# Patient Record
Sex: FEMALE | Race: WHITE | HISPANIC OR LATINO | Employment: FULL TIME | URBAN - METROPOLITAN AREA
[De-identification: names, ages, dates, MRNs, and addresses within clinical notes are randomized per-mention and may not be internally consistent; named-entity substitution may affect disease eponyms.]

---

## 2017-01-03 ENCOUNTER — FCC EXTENDED DOCUMENTATION (OUTPATIENT)
Dept: PSYCHOLOGY | Facility: CLINIC | Age: 22
End: 2017-01-03

## 2017-01-03 NOTE — PROGRESS NOTES
Discharge Summary  Multiple Sessions    Client Name: Priya Castano MRN#: [id number] YOB: 1995      Intake / Discharge Date: 6-12-15 and 1-3-17      DSM5 Diagnoses: (Sustained by DSM5 Criteria Listed Above)  Diagnoses: 309.81 (F43.10) Posttraumatic Stress Disorder (includes Posttraumatic Stress Dsiorder for Children 6 Years and Younger) Without dissociative symptoms  296.23 Major Depressive Disorder, Single Episode, Severe  Psychosocial & Contextual Factors: Mother passed away in an ATV accident   WHODAS 2.0 (12 item)  This questionnaire asks about difficulties due to health conditions. Health conditions  include  disease or illnesses, other health problems that may be short or long lasting,  injuries, mental health or emotional problems, and problems with alcohol or drugs.  Think back over the past 30 days and answer these questions, thinking about how much  difficulty you had doing the following activities. For each question, please [place] only one  response.  S1   Standing for long periods such as 30 minutes?   Mild = 2     S2   Taking care of household responsibilities?   None = 1     S3   Learning a new task, for example, learning how to get to a new place?   Mild = 2     S4   How much of a problem do you have joining community activities (for example, festivals, Mosque or other activities) in the same way as anyone else can?   Mild = 2     S5   How much have you been emotionally affected by your health problems?   Severe = 4     In the past 30 days, how much difficulty did you have in:     S6   Concentrating on doing something for ten minutes?   Severe = 4     S7   Walking a long distance such as a kilometer (or equivalent)?   None = 1     S8   Washing your whole body?   Mild = 2     S9   Getting dressed?   Mild = 2     S10   Dealing with people you do not know?   Moderate = 3     S11   Maintaining a friendship?   Severe = 4     S12   Your day to day work?   Moderate = 3          H1   Overall, in the past 30 days, how many days were these difficulties present?   Record number of days 25     H2   In the past 30 days, for how many days were you totally unable to carry out your usual activities or work because of any health condition?   Record number of days 0     H3   In the past 30 days, not counting the days that you were totally unable, for how many days did you cut back or reduce your usual activities or work because of any health condition?   Record number of days 25                        Presenting Concern:  Emotional, mental health and family struggles       Reason for Discharge:  Client is satisfied with progress      Disposition at Time of Last Encounter:   Comments:   Client made some progress with being assertive as an adult and also worked through some grief and loss     Risk Management:   Client has had a history of suicidal ideation: Tied to when her mothers passed away but no plan or intent  A safety and risk management plan has not been developed at this time, however client was given the after-hours number / 911 should there be a change in any of these risk factors.      Referred To:  ALLA Miller,    1/3/2017

## 2017-09-03 ENCOUNTER — HEALTH MAINTENANCE LETTER (OUTPATIENT)
Age: 22
End: 2017-09-03

## 2023-07-27 ENCOUNTER — APPOINTMENT (OUTPATIENT)
Dept: ULTRASOUND IMAGING | Facility: CLINIC | Age: 28
DRG: 872 | End: 2023-07-27
Attending: EMERGENCY MEDICINE

## 2023-07-27 ENCOUNTER — HOSPITAL ENCOUNTER (INPATIENT)
Facility: CLINIC | Age: 28
LOS: 3 days | Discharge: HOME | DRG: 872 | End: 2023-07-30
Attending: EMERGENCY MEDICINE | Admitting: FAMILY MEDICINE

## 2023-07-27 DIAGNOSIS — R17 ELEVATED BILIRUBIN: ICD-10-CM

## 2023-07-27 DIAGNOSIS — K70.31 ASCITES DUE TO ALCOHOLIC CIRRHOSIS (H): ICD-10-CM

## 2023-07-27 DIAGNOSIS — K70.10 ACUTE ALCOHOLIC HEPATITIS (H): Primary | ICD-10-CM

## 2023-07-27 DIAGNOSIS — F10.220 ACUTE ALCOHOLIC INTOXICATION IN ALCOHOLISM WITHOUT COMPLICATION (H): ICD-10-CM

## 2023-07-27 LAB
ALBUMIN SERPL BCG-MCNC: 2.7 G/DL (ref 3.5–5.2)
ALCOHOL BREATH TEST: 0.11 (ref 0–0.01)
ALP SERPL-CCNC: 304 U/L (ref 35–104)
ALT SERPL W P-5'-P-CCNC: 52 U/L (ref 0–50)
AMMONIA PLAS-SCNC: 37 UMOL/L (ref 11–51)
AMPHETAMINES UR QL SCN: NORMAL
ANION GAP SERPL CALCULATED.3IONS-SCNC: 13 MMOL/L (ref 7–15)
ANION GAP SERPL CALCULATED.3IONS-SCNC: 9 MMOL/L (ref 7–15)
APTT PPP: 35 SECONDS (ref 22–38)
AST SERPL W P-5'-P-CCNC: 185 U/L (ref 0–45)
ATRIAL RATE - MUSE: 134 BPM
BARBITURATES UR QL SCN: NORMAL
BASOPHILS # BLD AUTO: 0.2 10E3/UL (ref 0–0.2)
BASOPHILS NFR BLD AUTO: 1 %
BENZODIAZ UR QL SCN: NORMAL
BILIRUB SERPL-MCNC: 3.7 MG/DL
BUN SERPL-MCNC: 1.6 MG/DL (ref 6–20)
BUN SERPL-MCNC: 1.9 MG/DL (ref 6–20)
BZE UR QL SCN: NORMAL
CALCIUM SERPL-MCNC: 7.9 MG/DL (ref 8.6–10)
CALCIUM SERPL-MCNC: 8.2 MG/DL (ref 8.6–10)
CANNABINOIDS UR QL SCN: NORMAL
CHLORIDE SERPL-SCNC: 103 MMOL/L (ref 98–107)
CHLORIDE SERPL-SCNC: 104 MMOL/L (ref 98–107)
CREAT SERPL-MCNC: 0.39 MG/DL (ref 0.51–0.95)
CREAT SERPL-MCNC: 0.42 MG/DL (ref 0.51–0.95)
DEPRECATED HCO3 PLAS-SCNC: 20 MMOL/L (ref 22–29)
DEPRECATED HCO3 PLAS-SCNC: 23 MMOL/L (ref 22–29)
DIASTOLIC BLOOD PRESSURE - MUSE: NORMAL MMHG
EOSINOPHIL # BLD AUTO: 0.4 10E3/UL (ref 0–0.7)
EOSINOPHIL NFR BLD AUTO: 2 %
ERYTHROCYTE [DISTWIDTH] IN BLOOD BY AUTOMATED COUNT: 17.5 % (ref 10–15)
GFR SERPL CREATININE-BSD FRML MDRD: >90 ML/MIN/1.73M2
GFR SERPL CREATININE-BSD FRML MDRD: >90 ML/MIN/1.73M2
GLUCOSE SERPL-MCNC: 104 MG/DL (ref 70–99)
GLUCOSE SERPL-MCNC: 126 MG/DL (ref 70–99)
HAV IGM SERPL QL IA: NONREACTIVE
HBV CORE IGM SERPL QL IA: NONREACTIVE
HBV SURFACE AG SERPL QL IA: NONREACTIVE
HCG UR QL: NEGATIVE
HCT VFR BLD AUTO: 28.3 % (ref 35–47)
HCV AB SERPL QL IA: NONREACTIVE
HGB BLD-MCNC: 9.7 G/DL (ref 11.7–15.7)
IMM GRANULOCYTES # BLD: 0.9 10E3/UL
IMM GRANULOCYTES NFR BLD: 4 %
INR PPP: 1.86 (ref 0.85–1.15)
INTERPRETATION ECG - MUSE: NORMAL
LYMPHOCYTES # BLD AUTO: 3.5 10E3/UL (ref 0.8–5.3)
LYMPHOCYTES NFR BLD AUTO: 14 %
MAGNESIUM SERPL-MCNC: 1.7 MG/DL (ref 1.7–2.3)
MAGNESIUM SERPL-MCNC: 1.7 MG/DL (ref 1.7–2.3)
MCH RBC QN AUTO: 33.8 PG (ref 26.5–33)
MCHC RBC AUTO-ENTMCNC: 34.3 G/DL (ref 31.5–36.5)
MCV RBC AUTO: 99 FL (ref 78–100)
MONOCYTES # BLD AUTO: 2.5 10E3/UL (ref 0–1.3)
MONOCYTES NFR BLD AUTO: 10 %
NEUTROPHILS # BLD AUTO: 18 10E3/UL (ref 1.6–8.3)
NEUTROPHILS NFR BLD AUTO: 69 %
NRBC # BLD AUTO: 0 10E3/UL
NRBC BLD AUTO-RTO: 0 /100
OPIATES UR QL SCN: NORMAL
P AXIS - MUSE: 27 DEGREES
PHOSPHATE SERPL-MCNC: 2.9 MG/DL (ref 2.5–4.5)
PHOSPHATE SERPL-MCNC: 3.1 MG/DL (ref 2.5–4.5)
PLATELET # BLD AUTO: 268 10E3/UL (ref 150–450)
POTASSIUM SERPL-SCNC: 4 MMOL/L (ref 3.4–5.3)
POTASSIUM SERPL-SCNC: 4.1 MMOL/L (ref 3.4–5.3)
PR INTERVAL - MUSE: 122 MS
PROT SERPL-MCNC: 7.7 G/DL (ref 6.4–8.3)
QRS DURATION - MUSE: 84 MS
QT - MUSE: 308 MS
QTC - MUSE: 459 MS
R AXIS - MUSE: 21 DEGREES
RBC # BLD AUTO: 2.87 10E6/UL (ref 3.8–5.2)
SODIUM SERPL-SCNC: 135 MMOL/L (ref 136–145)
SODIUM SERPL-SCNC: 137 MMOL/L (ref 136–145)
SYSTOLIC BLOOD PRESSURE - MUSE: NORMAL MMHG
T AXIS - MUSE: 5 DEGREES
VENTRICULAR RATE- MUSE: 134 BPM
WBC # BLD AUTO: 25.4 10E3/UL (ref 4–11)

## 2023-07-27 PROCEDURE — 93010 ELECTROCARDIOGRAM REPORT: CPT | Performed by: EMERGENCY MEDICINE

## 2023-07-27 PROCEDURE — 80307 DRUG TEST PRSMV CHEM ANLYZR: CPT | Performed by: EMERGENCY MEDICINE

## 2023-07-27 PROCEDURE — 85610 PROTHROMBIN TIME: CPT | Performed by: EMERGENCY MEDICINE

## 2023-07-27 PROCEDURE — 258N000003 HC RX IP 258 OP 636: Performed by: STUDENT IN AN ORGANIZED HEALTH CARE EDUCATION/TRAINING PROGRAM

## 2023-07-27 PROCEDURE — 85730 THROMBOPLASTIN TIME PARTIAL: CPT | Performed by: EMERGENCY MEDICINE

## 2023-07-27 PROCEDURE — 93005 ELECTROCARDIOGRAM TRACING: CPT | Performed by: EMERGENCY MEDICINE

## 2023-07-27 PROCEDURE — 84100 ASSAY OF PHOSPHORUS: CPT | Performed by: STUDENT IN AN ORGANIZED HEALTH CARE EDUCATION/TRAINING PROGRAM

## 2023-07-27 PROCEDURE — 120N000002 HC R&B MED SURG/OB UMMC

## 2023-07-27 PROCEDURE — 80074 ACUTE HEPATITIS PANEL: CPT | Performed by: STUDENT IN AN ORGANIZED HEALTH CARE EDUCATION/TRAINING PROGRAM

## 2023-07-27 PROCEDURE — 99285 EMERGENCY DEPT VISIT HI MDM: CPT | Mod: 25 | Performed by: EMERGENCY MEDICINE

## 2023-07-27 PROCEDURE — 36415 COLL VENOUS BLD VENIPUNCTURE: CPT | Performed by: EMERGENCY MEDICINE

## 2023-07-27 PROCEDURE — 85025 COMPLETE CBC W/AUTO DIFF WBC: CPT | Performed by: EMERGENCY MEDICINE

## 2023-07-27 PROCEDURE — 83735 ASSAY OF MAGNESIUM: CPT | Performed by: STUDENT IN AN ORGANIZED HEALTH CARE EDUCATION/TRAINING PROGRAM

## 2023-07-27 PROCEDURE — 96360 HYDRATION IV INFUSION INIT: CPT | Performed by: EMERGENCY MEDICINE

## 2023-07-27 PROCEDURE — 258N000003 HC RX IP 258 OP 636: Performed by: EMERGENCY MEDICINE

## 2023-07-27 PROCEDURE — 250N000013 HC RX MED GY IP 250 OP 250 PS 637: Performed by: EMERGENCY MEDICINE

## 2023-07-27 PROCEDURE — 80053 COMPREHEN METABOLIC PANEL: CPT | Performed by: EMERGENCY MEDICINE

## 2023-07-27 PROCEDURE — 250N000013 HC RX MED GY IP 250 OP 250 PS 637: Performed by: STUDENT IN AN ORGANIZED HEALTH CARE EDUCATION/TRAINING PROGRAM

## 2023-07-27 PROCEDURE — 82140 ASSAY OF AMMONIA: CPT | Performed by: STUDENT IN AN ORGANIZED HEALTH CARE EDUCATION/TRAINING PROGRAM

## 2023-07-27 PROCEDURE — 76705 ECHO EXAM OF ABDOMEN: CPT

## 2023-07-27 PROCEDURE — 250N000011 HC RX IP 250 OP 636: Performed by: STUDENT IN AN ORGANIZED HEALTH CARE EDUCATION/TRAINING PROGRAM

## 2023-07-27 PROCEDURE — 81025 URINE PREGNANCY TEST: CPT | Performed by: EMERGENCY MEDICINE

## 2023-07-27 PROCEDURE — 82310 ASSAY OF CALCIUM: CPT | Performed by: STUDENT IN AN ORGANIZED HEALTH CARE EDUCATION/TRAINING PROGRAM

## 2023-07-27 PROCEDURE — 36415 COLL VENOUS BLD VENIPUNCTURE: CPT | Performed by: STUDENT IN AN ORGANIZED HEALTH CARE EDUCATION/TRAINING PROGRAM

## 2023-07-27 RX ORDER — FLUMAZENIL 0.1 MG/ML
0.2 INJECTION, SOLUTION INTRAVENOUS
Status: DISCONTINUED | OUTPATIENT
Start: 2023-07-27 | End: 2023-07-30 | Stop reason: HOSPADM

## 2023-07-27 RX ORDER — OLANZAPINE 5 MG/1
5-10 TABLET, ORALLY DISINTEGRATING ORAL EVERY 6 HOURS PRN
Status: DISCONTINUED | OUTPATIENT
Start: 2023-07-27 | End: 2023-07-29

## 2023-07-27 RX ORDER — HALOPERIDOL 5 MG/ML
2.5-5 INJECTION INTRAMUSCULAR EVERY 6 HOURS PRN
Status: DISCONTINUED | OUTPATIENT
Start: 2023-07-27 | End: 2023-07-29

## 2023-07-27 RX ORDER — MULTIPLE VITAMINS W/ MINERALS TAB 9MG-400MCG
1 TAB ORAL DAILY
Status: DISCONTINUED | OUTPATIENT
Start: 2023-07-27 | End: 2023-07-27

## 2023-07-27 RX ORDER — PROCHLORPERAZINE MALEATE 5 MG
10 TABLET ORAL EVERY 6 HOURS PRN
Status: DISCONTINUED | OUTPATIENT
Start: 2023-07-27 | End: 2023-07-30 | Stop reason: HOSPADM

## 2023-07-27 RX ORDER — CLONIDINE HYDROCHLORIDE 0.1 MG/1
0.1 TABLET ORAL EVERY 8 HOURS
Status: DISCONTINUED | OUTPATIENT
Start: 2023-07-27 | End: 2023-07-27

## 2023-07-27 RX ORDER — NORETHINDRONE ACETATE AND ETHINYL ESTRADIOL .03; 1.5 MG/1; MG/1
1 TABLET ORAL DAILY
Status: DISCONTINUED | OUTPATIENT
Start: 2023-07-27 | End: 2023-07-27

## 2023-07-27 RX ORDER — MULTIPLE VITAMINS W/ MINERALS TAB 9MG-400MCG
1 TAB ORAL DAILY
Status: DISCONTINUED | OUTPATIENT
Start: 2023-07-28 | End: 2023-07-27

## 2023-07-27 RX ORDER — ONDANSETRON 2 MG/ML
4 INJECTION INTRAMUSCULAR; INTRAVENOUS EVERY 6 HOURS PRN
Status: DISCONTINUED | OUTPATIENT
Start: 2023-07-27 | End: 2023-07-30 | Stop reason: HOSPADM

## 2023-07-27 RX ORDER — DIAZEPAM 10 MG/2ML
5-10 INJECTION, SOLUTION INTRAMUSCULAR; INTRAVENOUS EVERY 30 MIN PRN
Status: DISCONTINUED | OUTPATIENT
Start: 2023-07-27 | End: 2023-07-29

## 2023-07-27 RX ORDER — DIAZEPAM 10 MG
10 TABLET ORAL EVERY 30 MIN PRN
Status: DISCONTINUED | OUTPATIENT
Start: 2023-07-27 | End: 2023-07-29

## 2023-07-27 RX ORDER — LORAZEPAM 1 MG/1
1-4 TABLET ORAL EVERY 30 MIN PRN
Status: DISCONTINUED | OUTPATIENT
Start: 2023-07-27 | End: 2023-07-27

## 2023-07-27 RX ORDER — IBUPROFEN 600 MG/1
600 TABLET, FILM COATED ORAL EVERY 6 HOURS PRN
Status: DISCONTINUED | OUTPATIENT
Start: 2023-07-27 | End: 2023-07-30 | Stop reason: HOSPADM

## 2023-07-27 RX ORDER — FOLIC ACID 1 MG/1
1 TABLET ORAL DAILY
Status: DISCONTINUED | OUTPATIENT
Start: 2023-07-27 | End: 2023-07-27

## 2023-07-27 RX ORDER — ONDANSETRON 4 MG/1
4 TABLET, ORALLY DISINTEGRATING ORAL EVERY 6 HOURS PRN
Status: DISCONTINUED | OUTPATIENT
Start: 2023-07-27 | End: 2023-07-30 | Stop reason: HOSPADM

## 2023-07-27 RX ORDER — LIDOCAINE 40 MG/G
CREAM TOPICAL
Status: DISCONTINUED | OUTPATIENT
Start: 2023-07-27 | End: 2023-07-30 | Stop reason: HOSPADM

## 2023-07-27 RX ORDER — FOLIC ACID 1 MG/1
1 TABLET ORAL DAILY
Status: DISCONTINUED | OUTPATIENT
Start: 2023-07-28 | End: 2023-07-27

## 2023-07-27 RX ORDER — PANTOPRAZOLE SODIUM 40 MG/1
40 TABLET, DELAYED RELEASE ORAL
Status: DISCONTINUED | OUTPATIENT
Start: 2023-07-27 | End: 2023-07-30 | Stop reason: HOSPADM

## 2023-07-27 RX ORDER — PROCHLORPERAZINE 25 MG
25 SUPPOSITORY, RECTAL RECTAL EVERY 12 HOURS PRN
Status: DISCONTINUED | OUTPATIENT
Start: 2023-07-27 | End: 2023-07-30 | Stop reason: HOSPADM

## 2023-07-27 RX ADMIN — LORAZEPAM 2 MG: 1 TABLET ORAL at 13:59

## 2023-07-27 RX ADMIN — THIAMINE HCL TAB 100 MG 100 MG: 100 TAB at 13:59

## 2023-07-27 RX ADMIN — SODIUM CHLORIDE 1000 ML: 9 INJECTION, SOLUTION INTRAVENOUS at 10:56

## 2023-07-27 RX ADMIN — DIAZEPAM 10 MG: 10 TABLET ORAL at 17:55

## 2023-07-27 RX ADMIN — SODIUM CHLORIDE, POTASSIUM CHLORIDE, SODIUM LACTATE AND CALCIUM CHLORIDE 1000 ML: 600; 310; 30; 20 INJECTION, SOLUTION INTRAVENOUS at 22:34

## 2023-07-27 RX ADMIN — FOLIC ACID 1 MG: 1 TABLET ORAL at 13:59

## 2023-07-27 RX ADMIN — PANTOPRAZOLE SODIUM 40 MG: 40 TABLET, DELAYED RELEASE ORAL at 17:49

## 2023-07-27 RX ADMIN — MULTIPLE VITAMINS W/ MINERALS TAB 1 TABLET: TAB at 13:59

## 2023-07-27 RX ADMIN — THIAMINE HYDROCHLORIDE 500 MG: 100 INJECTION, SOLUTION INTRAMUSCULAR; INTRAVENOUS at 15:33

## 2023-07-27 RX ADMIN — THIAMINE HYDROCHLORIDE 500 MG: 100 INJECTION, SOLUTION INTRAMUSCULAR; INTRAVENOUS at 20:36

## 2023-07-27 ASSESSMENT — ACTIVITIES OF DAILY LIVING (ADL)
CONCENTRATING,_REMEMBERING_OR_MAKING_DECISIONS_DIFFICULTY: NO
ADLS_ACUITY_SCORE: 31
ADLS_ACUITY_SCORE: 35
FALL_HISTORY_WITHIN_LAST_SIX_MONTHS: NO
ADLS_ACUITY_SCORE: 35
WEAR_GLASSES_OR_BLIND: NO
ADLS_ACUITY_SCORE: 20
DRESSING/BATHING_DIFFICULTY: NO
ADLS_ACUITY_SCORE: 35
CHANGE_IN_FUNCTIONAL_STATUS_SINCE_ONSET_OF_CURRENT_ILLNESS/INJURY: NO
ADLS_ACUITY_SCORE: 35
ADLS_ACUITY_SCORE: 35
TOILETING_ISSUES: NO
DIFFICULTY_COMMUNICATING: NO
DOING_ERRANDS_INDEPENDENTLY_DIFFICULTY: NO
WALKING_OR_CLIMBING_STAIRS_DIFFICULTY: NO
DIFFICULTY_EATING/SWALLOWING: NO

## 2023-07-27 NOTE — PHARMACY-ADMISSION MEDICATION HISTORY
Pharmacist Admission Medication History    Admission medication history is complete. The information provided in this note is only as accurate as the sources available at the time of the update.    Medication reconciliation/reorder completed by provider prior to medication history? No    Information Source(s): Patient and CareEverywhere/SureScripts via in-person    Pertinent Information: none    Changes made to PTA medication list:  Added: None  Deleted: birth control  Changed: None    Medication Affordability: unable to assess     Allergies reviewed with patient and updates made in EHR: yes    Medication History Completed By: Sophie Ramirez RPH 7/27/2023 2:43 PM    Prior to Admission medications    Not on File

## 2023-07-27 NOTE — ED TRIAGE NOTES
Alcohol Problem Seeking detox from etoh. Last drink was last night. Drinks 1 bottle of wine daily x 2 months. Denies hx withdrawals seizures, drug use.       Triage Assessment       Row Name 07/27/23 0957       Triage Assessment (Adult)    Airway WDL WDL       Respiratory WDL    Respiratory WDL WDL       Skin Circulation/Temperature WDL    Skin Circulation/Temperature WDL WDL       Cardiac WDL    Cardiac WDL WDL       Peripheral/Neurovascular WDL    Peripheral Neurovascular WDL WDL       Cognitive/Neuro/Behavioral WDL    Cognitive/Neuro/Behavioral WDL WDL

## 2023-07-27 NOTE — H&P
[facility]    History and Physical - [facility]       Date of Admission:  7/27/2023    Assessment & Plan      Priya Castano is a 28 year old female admitted on 7/27/2023. She has a past medical history significant for hypothyroidism and latent TB and presented to the ED seeking detox and was found to have acute alcoholic hepatitis.      # Mild acute alcoholic hepatitis  # Alcohol withdrawal  # Jaundice, scleral icterus  Patient with last reported use 7/26 around 2000. History of several other periods of withdraw, and no history of withdrawal seizures or psychosis. States that her family is already working towards placement in a combined inpatient/outpatient treatment facility once detox is complete. She has some understanding of her disease process, but would likely benefit from a more extensive conversation of the damage alcohol inflicts to her body. Differential for jaundice includes extra/intrahepatic cholestasis. No sign of ductal dilatation or obstruction on US. Patient is on no medications that could be contributing to her condition. She denies history of STI and substance use other than alcohol. UDS was negative. Could consider adding clonidine and/or gabapentin for symptomatic management as needed. Patient immunized for Hep A and Hep B, though will confirm with serologic testing that she is immune from HepB. No history or evidence of variceal/GI bleeding or previous instances of SBP, so no indication for prophylaxis at this time. Hepatitis discriminant function index -10, no indication for glucocorticoids at this time. Factors with the best ability to predict cirrhosis in adults (without a liver biopsy for definitive diagnosis) include presence of ascites (small volume in this patient), platelet count <160 (268 in our patient), spinder angiomata, and a Bonacini cirrhosis discriminant score >7 (this patient's score is 4).   - ALISIA  Valium   - Wernicke's Thiamine Protocol  - Seizure precautions/pad  - Folate/Multivitamin  - Viral hepatitis panel pending  - EKG pending for QT check  - GI prophylaxis with PPI  - Offer Pneumococcal vaccine, indicated with liver disease  - Consider screening for esophageal varices via upper GI  - Connection with outpatient hepatology for every 3 month monitoring    MELD 3.0: 21 at 7/27/2023  1:55 PM  MELD-Na: 19 at 7/27/2023  1:55 PM  Calculated from:  Serum Creatinine: 0.39 mg/dL (Using min of 1 mg/dL) at 7/27/2023 10:37 AM  Serum Sodium: 137 mmol/L at 7/27/2023 10:37 AM  Total Bilirubin: 3.7 mg/dL at 7/27/2023 10:37 AM  Serum Albumin: 2.7 g/dL at 7/27/2023 10:37 AM  INR(ratio): 1.86 at 7/27/2023  1:55 PM  Age at listing (hypothetical): 28 years  Sex: Female at 7/27/2023  1:55 PM    #Coagulation Defect  INR at admit was 1.86, likely sequale from alcoholic liver disease. Will monitor while inpatient.     # Mild alcoholic keotacidosis  # At Risk for Refeeding Syndrome  - Mag, Phos pending  - q8hr BMP/Mg/Phos   - Mag, Phos, and K RN managed replacement protocols    #Anemia, Normocytic - borderline macrocytic  Likely a combination of long term alcohol use and nutritional deficiency. Will monitor with daily CBC. Platelet count normal at this time.        Diet: Combination Diet Regular Diet Adult  DVT Prophylaxis: Low Risk/Ambulatory with no VTE prophylaxis indicated  Short Catheter: Not present  Fluids: S/p 1L bolus, now PO  Lines: None     Cardiac Monitoring: None  Code Status: Full Code    Clinically Significant Risk Factors Present on Admission              # Hypoalbuminemia: Lowest albumin = 2.7 g/dL at 7/27/2023 10:37 AM, will monitor as appropriate  # Coagulation Defect: INR = 1.86 (Ref range: 0.85 - 1.15) and/or PTT = 35 Seconds (Ref range: 22 - 38 Seconds), will monitor for bleeding         # Obesity: Estimated body mass index is 39.42 kg/m  as calculated from the following:    Height as of this encounter:  "1.588 m (5' 2.5\").    Weight as of this encounter: 99.3 kg (219 lb).            Disposition Plan      Expected Discharge Date: 07/29/2023                The patient's care was discussed with the Attending Physician, Dr. Francois .      [facility]  [facility]  Securely message with OLSET (more info)  Text page via [facility] Paging/Directory   See signed in provider for up to date coverage information  ______________________________________________________________________    Chief Complaint   Seeking detox    History is obtained from the patient    History of Present Illness   Priya Castano is a 28 year old female admitted on 7/27/2023. She has a past medical history significant for hypothyroidism and latent TB and presented to the ED seeking detox and was found to have acute alcoholic hepatitis.      Priya has been drinking one bottle of wine daily for the past two months, with her last drink last night around 2000. Does not currently feel any withdrawal symptoms other than feeling dehydrated. Denies suicidal ideation. Has been an on and off drinker for a long time, and has never been through detox. She has quit on her own several times and has never experienced significant withdrawal symptoms/seizures/psychosis. The family planned an intervention and is making plans for outpatient/inpatient treatment on their own.     She denies abdominal tenderness, nausea, vomiting, dysuria, hearing voices, seeing things that are not there, confusion, headache, vision changes, hematochezia, hematemesis, or other bleeding. Her last period was 6 weeks ago and occurs irregularly (negative hCG). She presented to the ED with her mother and a counselor, but on the occasion of this exam is interviewed alone in the ED hallway.     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     ED Course:  Fluids: S/p 1L NS  Labs: UDS " negative, hCG negative, bili elevated at 3.7, Alk phos 304, , ALT 52. BUN elevated at 1.6, with a low calcium and albumin. WBC on admit 25.4, 9.7 Hgb, INR elevated at 1.86,  Imaging: RUQ ultrasound - hepatomegaly, severe hepatic steatosis, gallbladder edema, chronic liver disease vs cirrhosis, and small volume ascites. No evidence of biliary obstruction.       The patient was admitted to the [facility] inpatient service for detox and acute EtOH hepatitis management.       Past Medical History    Past Medical History:   Diagnosis Date    Hypothyroidism        Past Surgical History   History reviewed. No pertinent surgical history.    Prior to Admission Medications   None        Physical Exam   Vital Signs: Temp: 98.5  F (36.9  C) Temp src: Oral BP: 137/84 Pulse: (!) 133   Resp: 18 SpO2: 97 % O2 Device: None (Room air)    Weight: 219 lbs 0 oz  Physical Exam  Constitutional:       General: She is not in acute distress.     Appearance: She is not diaphoretic.   HENT:      Head: Normocephalic and atraumatic.      Nose: Nose normal.   Eyes:      General: Scleral icterus present.      Extraocular Movements: Extraocular movements intact.   Cardiovascular:      Rate and Rhythm: Tachycardia present.   Pulmonary:      Effort: No respiratory distress.   Abdominal:      General: There is distension.      Palpations: Abdomen is soft. There is no mass.      Tenderness: There is no abdominal tenderness. There is no guarding or rebound.   Musculoskeletal:         General: No swelling.      Cervical back: Normal range of motion.      Right lower leg: No edema.      Left lower leg: No edema.   Skin:     General: Skin is warm and dry.      Coloration: Skin is jaundiced.   Neurological:      General: No focal deficit present.      Mental Status: She is alert and oriented to person, place, and time.   Psychiatric:         Mood and Affect: Mood normal.         Behavior: Behavior normal.         Thought Content: Thought content  normal.         Judgment: Judgment normal.           Data     I have personally reviewed the following data over the past 24 hrs:    25.4 (H)  \   9.7 (L)   / 268     137 104 1.6 (L) /  126 (H)   4.0 20 (L) 0.39 (L) \     ALT: 52 (H) AST: 185 (H) AP: 304 (H) TBILI: 3.7 (H)   ALB: 2.7 (L) TOT PROTEIN: 7.7 LIPASE: N/A     INR:  1.86 (H) PTT:  35   D-dimer:  N/A Fibrinogen:  N/A     Imaging results reviewed over the past 24 hrs:   Recent Results (from the past 24 hour(s))   US Abdomen Limited (RUQ)    Narrative    US ABDOMEN LIMITED 7/27/2023 12:54 PM    CLINICAL HISTORY: elevated bilirubin in patient with alcohol use  disorder.  TECHNIQUE: Limited abdominal ultrasound.    COMPARISON: None available.    FINDINGS:    GALLBLADDER: The gallbladder is not pathologically distended. There is  significant wall edema without gallstones or pericholecystic fluid.  Negative sonographic Morrison's sign.    BILE DUCTS: There is no intrahepatic biliary dilatation. The common  duct measures 3 mm.    LIVER: Enlarged, measuring up to 23 cm, with diffusely increased  echogenicity. Nodular contour noted.    RIGHT KIDNEY: No hydronephrosis.    PANCREAS: The visualized portions of the pancreas are normal.    Small volume ascites throughout the abdomen and pelvis.      Impression    IMPRESSION:  1.  Morphologic changes suspicious for chronic liver  disease/cirrhosis. Small volume ascites.  2.  Hepatomegaly with severe hepatic steatosis and gallbladder edema,  could be seen with superimposed acute hepatitis.  3.  No evidence of biliary obstruction.    BENJA PALMER MD         SYSTEM ID:  [id number]

## 2023-07-27 NOTE — ED PROVIDER NOTES
[facility] EMERGENCY DEPARTMENT ([facility])  7/27/23  [facility]      History     Chief Complaint   Patient presents with    Alcohol Problem     Seeking detox from etoh. Last drink was last night. Drinks 1 bottle of wine daily x 2 months. Denies hx withdrawals seizures, drug use.      HPI  Priya Castano is a 28 year old female with a past medical history significant for hypothyroidism and latent TB who presents to the Emergency Department seeking detox. Patient states she has been drinking one bottle a wine daily for the past two months. Her last drink was yesterday night. She does not feel any withdrawal symptoms other than feeling a little dehydrated. She denies any other substance use. She denies feeling any suicidal ideation. She states she has been drinking on and off for a long time. She has never been through detox and is planning outpatient treatment after a family intervention. She denies any abdominal pain but, states she feels bloated. She denies any chance of pregnancy and states she has irregular periods. Her last period was 6 weeks ago.  She is here with her mother and a counselor.    Past Medical History  Past Medical History:   Diagnosis Date    Hypothyroidism      History reviewed. No pertinent surgical history.  No current outpatient medications on file.    No Known Allergies  Family History  Family History   Problem Relation Age of Onset    Thyroid Disease Mother      Social History   Social History     Tobacco Use    Smoking status: Never   Substance Use Topics    Alcohol use: Yes     Comment: 1 bottle wine daily    Drug use: No      Past medical history, past surgical history, medications, allergies, family history, and social history were reviewed with the patient. No additional pertinent items.      A medically appropriate review of systems was performed with pertinent positives and negatives noted in the HPI, and all other systems negative.    Physical Exam   BP: (!) 137/92  Pulse:  "(!) 140  Temp: 98.7  F (37.1  C)  Resp: 18  Height: 158.8 cm (5' 2.5\")  Weight: 99.3 kg (219 lb)  SpO2: 94 %  Physical Exam  Physical Exam   Constitutional:   well nourished, well developed, resting comfortably   HENT:   Head: Normocephalic and atraumatic.   Eyes: Conjunctivae are normal. Pupils are equal, round, and reactive to light. Sclera icteric  pharynx has no erythema or exudate, mucous membranes are moist  Neck:   no adenopathy, no bony tenderness  Cardiovascular: tachycardic without murmurs or gallops  Pulmonary/Chest: Clear to auscultation bilaterally, with no wheezes or retractions. No respiratory distress.  GI: Soft with good bowel sounds.  Non-tender, non-distended, with no guarding, no rebound, no peritoneal signs.   Back:  No bony or CVA tenderness   Musculoskeletal:  no edema  Skin: Skin is warm and dry.   Neurological: alert and oriented to person, place, and time. Nonfocal exam, no tremor  Psychiatric: Speech is normal. Judgment and thought content normal. mood appears normal. Patient is not agitated, not aggressive, not hyperactive, not actively hallucinating and not combative. Thought content is not paranoid and not delusional. Cognition and memory are normal. No homicidal and no suicidal ideation.     ED Course, Procedures, & Data      Procedures       EKG Interpretation:      Interpreted by Antonietta Young MD  Time reviewed:1525 pm   Symptoms at time of EKG: see hpi   Rhythm: Normal sinus  and Sinus tachycardia  Rate: 130-140  Axis: Normal  Ectopy: None  Conduction: Normal  ST Segments/ T Waves: No acute ischemic changes  Q Waves: None  Comparison to prior: No old EKG available    Clinical Impression: Sinus tachycardia, rate of 134 bpm with nonspecific ST-T wave changes                Results for orders placed or performed during the hospital encounter of 07/27/23   US Abdomen Limited (RUQ)     Status: None    Narrative    US ABDOMEN LIMITED 7/27/2023 12:54 PM    CLINICAL HISTORY: elevated " bilirubin in patient with alcohol use  disorder.  TECHNIQUE: Limited abdominal ultrasound.    COMPARISON: None available.    FINDINGS:    GALLBLADDER: The gallbladder is not pathologically distended. There is  significant wall edema without gallstones or pericholecystic fluid.  Negative sonographic Morrison's sign.    BILE DUCTS: There is no intrahepatic biliary dilatation. The common  duct measures 3 mm.    LIVER: Enlarged, measuring up to 23 cm, with diffusely increased  echogenicity. Nodular contour noted.    RIGHT KIDNEY: No hydronephrosis.    PANCREAS: The visualized portions of the pancreas are normal.    Small volume ascites throughout the abdomen and pelvis.      Impression    IMPRESSION:  1.  Morphologic changes suspicious for chronic liver  disease/cirrhosis. Small volume ascites.  2.  Hepatomegaly with severe hepatic steatosis and gallbladder edema,  could be seen with superimposed acute hepatitis.  3.  No evidence of biliary obstruction.    BENJA PALMER MD         SYSTEM ID:  [id number]   HCG qualitative urine     Status: Normal   Result Value Ref Range    hCG Urine Qualitative Negative Negative   Drug abuse screen 1 urine (ED)     Status: Normal   Result Value Ref Range    Amphetamines Urine Screen Negative Screen Negative    Barbituates Urine Screen Negative Screen Negative    Benzodiazepine Urine Screen Negative Screen Negative    Cannabinoids Urine Screen Negative Screen Negative    Cocaine Urine Screen Negative Screen Negative    Opiates Urine Screen Negative Screen Negative   Comprehensive metabolic panel     Status: Abnormal   Result Value Ref Range    Sodium 137 136 - 145 mmol/L    Potassium 4.0 3.4 - 5.3 mmol/L    Chloride 104 98 - 107 mmol/L    Carbon Dioxide (CO2) 20 (L) 22 - 29 mmol/L    Anion Gap 13 7 - 15 mmol/L    Urea Nitrogen 1.6 (L) 6.0 - 20.0 mg/dL    Creatinine 0.39 (L) 0.51 - 0.95 mg/dL    Calcium 8.2 (L) 8.6 - 10.0 mg/dL    Glucose 126 (H) 70 - 99 mg/dL    Alkaline Phosphatase 304  (H) 35 - 104 U/L     (H) 0 - 45 U/L    ALT 52 (H) 0 - 50 U/L    Protein Total 7.7 6.4 - 8.3 g/dL    Albumin 2.7 (L) 3.5 - 5.2 g/dL    Bilirubin Total 3.7 (H) <=1.2 mg/dL    GFR Estimate >90 >60 mL/min/1.73m2   CBC with platelets and differential     Status: Abnormal   Result Value Ref Range    WBC Count 25.4 (H) 4.0 - 11.0 10e3/uL    RBC Count 2.87 (L) 3.80 - 5.20 10e6/uL    Hemoglobin 9.7 (L) 11.7 - 15.7 g/dL    Hematocrit 28.3 (L) 35.0 - 47.0 %    MCV 99 78 - 100 fL    MCH 33.8 (H) 26.5 - 33.0 pg    MCHC 34.3 31.5 - 36.5 g/dL    RDW 17.5 (H) 10.0 - 15.0 %    Platelet Count 268 150 - 450 10e3/uL    % Neutrophils 69 %    % Lymphocytes 14 %    % Monocytes 10 %    % Eosinophils 2 %    % Basophils 1 %    % Immature Granulocytes 4 %    NRBCs per 100 WBC 0 <1 /100    Absolute Neutrophils 18.0 (H) 1.6 - 8.3 10e3/uL    Absolute Lymphocytes 3.5 0.8 - 5.3 10e3/uL    Absolute Monocytes 2.5 (H) 0.0 - 1.3 10e3/uL    Absolute Eosinophils 0.4 0.0 - 0.7 10e3/uL    Absolute Basophils 0.2 0.0 - 0.2 10e3/uL    Absolute Immature Granulocytes 0.9 (H) <=0.4 10e3/uL    Absolute NRBCs 0.0 10e3/uL   INR     Status: Abnormal   Result Value Ref Range    INR 1.86 (H) 0.85 - 1.15   PTT     Status: Normal   Result Value Ref Range    aPTT 35 22 - 38 Seconds   Magnesium     Status: Normal   Result Value Ref Range    Magnesium 1.7 1.7 - 2.3 mg/dL   Phosphorus     Status: Normal   Result Value Ref Range    Phosphorus 3.1 2.5 - 4.5 mg/dL   EKG 12-lead, complete     Status: None (Preliminary result)   Result Value Ref Range    Systolic Blood Pressure  mmHg    Diastolic Blood Pressure  mmHg    Ventricular Rate 134 BPM    Atrial Rate 134 BPM    KY Interval 122 ms    QRS Duration 84 ms     ms    QTc 459 ms    P Axis 27 degrees    R AXIS 21 degrees    T Axis 5 degrees    Interpretation ECG       Sinus tachycardia  Nonspecific T wave abnormality  Abnormal ECG     Alcohol breath test POCT     Status: Abnormal   Result Value Ref Range     Alcohol Breath Test 0.110 (A) 0.00 - 0.01   Urine Drugs of Abuse Screen     Status: Normal    Narrative    The following orders were created for panel order Urine Drugs of Abuse Screen.  Procedure                               Abnormality         Status                     ---------                               -----------         ------                     Drug abuse screen 1 urin...[[id number]]  Normal              Final result                 Please view results for these tests on the individual orders.   CBC with platelets differential     Status: Abnormal    Narrative    The following orders were created for panel order CBC with platelets differential.  Procedure                               Abnormality         Status                     ---------                               -----------         ------                     CBC with platelets and d...[[id number]]  Abnormal            Final result                 Please view results for these tests on the individual orders.     Medications   lidocaine 1 % 0.1-1 mL (has no administration in time range)   lidocaine (LMX4) cream (has no administration in time range)   sodium chloride (PF) 0.9% PF flush 3 mL (has no administration in time range)   sodium chloride (PF) 0.9% PF flush 3 mL (has no administration in time range)   melatonin tablet 1 mg (has no administration in time range)   ibuprofen (ADVIL/MOTRIN) tablet 600 mg (has no administration in time range)   ondansetron (ZOFRAN ODT) ODT tab 4 mg (has no administration in time range)     Or   ondansetron (ZOFRAN) injection 4 mg (has no administration in time range)   prochlorperazine (COMPAZINE) injection 10 mg (has no administration in time range)     Or   prochlorperazine (COMPAZINE) tablet 10 mg (has no administration in time range)     Or   prochlorperazine (COMPAZINE) suppository 25 mg (has no administration in time range)   OLANZapine zydis (zyPREXA) ODT tab 5-10 mg (has no administration in time range)      Or   haloperidol lactate (HALDOL) injection 2.5-5 mg (has no administration in time range)   flumazenil (ROMAZICON) injection 0.2 mg (has no administration in time range)   melatonin tablet 5 mg (has no administration in time range)   diazepam (VALIUM) tablet 10 mg (has no administration in time range)     Or   diazepam (VALIUM) injection 5-10 mg (has no administration in time range)   thiamine (B-1) 500 mg in sodium chloride 0.9 % 50 mL intermittent infusion (has no administration in time range)     Followed by   thiamine (B-1) 250 mg in sodium chloride 0.9 % 50 mL intermittent infusion (has no administration in time range)     Followed by   thiamine (B-1) tablet 100 mg (has no administration in time range)   pantoprazole (PROTONIX) EC tablet 40 mg (has no administration in time range)   0.9% sodium chloride BOLUS (0 mLs Intravenous Stopped 7/27/23 1200)     Labs Ordered and Resulted from Time of ED Arrival to Time of ED Departure   COMPREHENSIVE METABOLIC PANEL - Abnormal       Result Value    Sodium 137      Potassium 4.0      Chloride 104      Carbon Dioxide (CO2) 20 (*)     Anion Gap 13      Urea Nitrogen 1.6 (*)     Creatinine 0.39 (*)     Calcium 8.2 (*)     Glucose 126 (*)     Alkaline Phosphatase 304 (*)      (*)     ALT 52 (*)     Protein Total 7.7      Albumin 2.7 (*)     Bilirubin Total 3.7 (*)     GFR Estimate >90     CBC WITH PLATELETS AND DIFFERENTIAL - Abnormal    WBC Count 25.4 (*)     RBC Count 2.87 (*)     Hemoglobin 9.7 (*)     Hematocrit 28.3 (*)     MCV 99      MCH 33.8 (*)     MCHC 34.3      RDW 17.5 (*)     Platelet Count 268      % Neutrophils 69      % Lymphocytes 14      % Monocytes 10      % Eosinophils 2      % Basophils 1      % Immature Granulocytes 4      NRBCs per 100 WBC 0      Absolute Neutrophils 18.0 (*)     Absolute Lymphocytes 3.5      Absolute Monocytes 2.5 (*)     Absolute Eosinophils 0.4      Absolute Basophils 0.2      Absolute Immature Granulocytes 0.9 (*)      Absolute NRBCs 0.0     INR - Abnormal    INR 1.86 (*)    ALCOHOL BREATH TEST POCT - Abnormal    Alcohol Breath Test 0.110 (*)    HCG QUALITATIVE URINE - Normal    hCG Urine Qualitative Negative     DRUG ABUSE SCREEN 1 URINE (ED) - Normal    Amphetamines Urine Screen Negative      Barbituates Urine Screen Negative      Benzodiazepine Urine Screen Negative      Cannabinoids Urine Screen Negative      Cocaine Urine Screen Negative      Opiates Urine Screen Negative     PARTIAL THROMBOPLASTIN TIME - Normal    aPTT 35     MAGNESIUM - Normal    Magnesium 1.7     PHOSPHORUS - Normal    Phosphorus 3.1     AMMONIA   ACUTE HEPATITIS PANEL     US Abdomen Limited (RUQ)   Final Result   IMPRESSION:   1.  Morphologic changes suspicious for chronic liver   disease/cirrhosis. Small volume ascites.   2.  Hepatomegaly with severe hepatic steatosis and gallbladder edema,   could be seen with superimposed acute hepatitis.   3.  No evidence of biliary obstruction.      BENJA PALMER MD            SYSTEM ID:  [id number]      POC US ABDOMEN LIMITED    (Results Pending)          Critical care was not performed.     Medical Decision Making  The patient's presentation was of high complexity (a chronic illness severe exacerbation, progression, or side effect of treatment).    The patient's evaluation involved:  ordering and/or review of 3+ test(s) in this encounter (see separate area of note for details)  independent interpretation of testing performed by another health professional (I independently reviewed the imaging studies)  discussion of management or test interpretation with another health professional (hospitalist)    The patient's management necessitated high risk (drug therapy requiring intensive monitoring (see separate area of note for details)) and high risk (a decision regarding hospitalization).    Assessment & Plan        I have reviewed the nursing notes.   Emergency Department course:  The patient was seen and examined at  1021 am in [facility].  Breathalyzer is 0.110.  I note that the patient is quite tachycardic here.  I treated her with normal saline bolus IV.  She also appears to have scleral icterus.    Laboratory studies show a markedly elevated total bilirubin of 3.7.  She also has an elevated alkaline phosphatase of 304, elevated AST of 185 and elevated ALT of 52.  BUN is elevated at 1.6.  Albumin and calcium are low.  The patient describes abdominal bloating.  I obtained a right upper quadrant ultrasound which shows IMPRESSION:  1.  Morphologic changes suspicious for chronic liver  disease/cirrhosis. Small volume ascites.  2.  Hepatomegaly with severe hepatic steatosis and gallbladder edema,  could be seen with superimposed acute hepatitis.  3.  No evidence of biliary obstruction.  Urine drug screen is negative.  hCG is negative.    EKG shows Sinus tachycardia, rate of 134 bpm with nonspecific ST-T wave changes.    Priya Castano is a 28 year old female with a history of alcohol use disorder who presents with acute alcohol intoxication and request for detox.  She is tachycardic and appears to be quite dehydrated.  She has abdominal bloating and a markedly elevated total bilirubin.  She also has transaminitis, likely secondary to alcohol abuse.  She is on the [facility] protocol for acute alcohol withdrawal and has received thiamine, folic acid, multivitamins p.o.  She will be admitted to the medicine service for further evaluation and treatment.  I spoke with Dr. Mcpherson, triage hospitalist, regarding admission.  She requested I add on an INR and PTT.  INR is elevated at 1.86.  I have reviewed the findings, diagnosis, plan and need for follow up with the patient.    New Prescriptions    No medications on file       Final diagnoses:   Acute alcoholic intoxication in alcoholism without complication (H)   Elevated bilirubin   Ascites due to alcoholic cirrhosis (H)   I, STEFANI MITCHELL , am serving as a trained medical scribe to document  services personally performed by Antonietta Young MD, based on the provider's statements to me.      I, Antonietta Young MD, was physically present and have reviewed and verified the accuracy of this note documented by STEFANI MITCHELL.   This note was created in part by the use of Dragon voice recognition dictation system. Inadvertent grammatical errors and typographical errors may still exist.  MD Antonietta Munguia MD  [facility] EMERGENCY DEPARTMENT  7/27/2023     Antonietta Young MD  07/27/23 1532

## 2023-07-28 ENCOUNTER — APPOINTMENT (OUTPATIENT)
Dept: INTERVENTIONAL RADIOLOGY/VASCULAR | Facility: CLINIC | Age: 28
DRG: 872 | End: 2023-07-28
Attending: PHYSICIAN ASSISTANT

## 2023-07-28 ENCOUNTER — APPOINTMENT (OUTPATIENT)
Dept: CT IMAGING | Facility: CLINIC | Age: 28
DRG: 872 | End: 2023-07-28

## 2023-07-28 LAB
% LINING CELLS, BODY FLUID: 13 %
ABSOLUTE NEUTROPHILS, BODY FLUID: 28.7 /UL
ALBUMIN BODY FLUID SOURCE: NORMAL
ALBUMIN FLD-MCNC: 0.6 G/DL
ALBUMIN SERPL BCG-MCNC: 2.4 G/DL (ref 3.5–5.2)
ALP SERPL-CCNC: 274 U/L (ref 35–104)
ALT SERPL W P-5'-P-CCNC: 43 U/L (ref 0–50)
AMYLASE BODY FLUID SOURCE: NORMAL
AMYLASE FLD-CCNC: 9 U/L
ANION GAP SERPL CALCULATED.3IONS-SCNC: 9 MMOL/L (ref 7–15)
APAP SERPL-MCNC: <5 UG/ML (ref 10–30)
APPEARANCE FLD: CLEAR
AST SERPL W P-5'-P-CCNC: 146 U/L (ref 0–45)
BASOPHILS # BLD AUTO: 0.1 10E3/UL (ref 0–0.2)
BASOPHILS NFR BLD AUTO: 1 %
BASOPHILS NFR FLD MANUAL: 0 %
BILIRUB DIRECT SERPL-MCNC: 2.09 MG/DL (ref 0–0.3)
BILIRUB SERPL-MCNC: 4.2 MG/DL
BILIRUB SERPL-MCNC: 4.5 MG/DL
BUN SERPL-MCNC: 2.3 MG/DL (ref 6–20)
CALCIUM SERPL-MCNC: 7.9 MG/DL (ref 8.6–10)
CELL COUNT BODY FLUID SOURCE: NORMAL
CHLORIDE SERPL-SCNC: 105 MMOL/L (ref 98–107)
COLOR FLD: YELLOW
CREAT SERPL-MCNC: 0.49 MG/DL (ref 0.51–0.95)
D DIMER PPP FEU-MCNC: 12.25 UG/ML FEU (ref 0–0.5)
DEPRECATED HCO3 PLAS-SCNC: 24 MMOL/L (ref 22–29)
EOSINOPHIL # BLD AUTO: 0.3 10E3/UL (ref 0–0.7)
EOSINOPHIL NFR BLD AUTO: 1 %
EOSINOPHIL NFR FLD MANUAL: 0 %
ERYTHROCYTE [DISTWIDTH] IN BLOOD BY AUTOMATED COUNT: 17.8 % (ref 10–15)
ERYTHROCYTE [DISTWIDTH] IN BLOOD BY AUTOMATED COUNT: 18 % (ref 10–15)
GFR SERPL CREATININE-BSD FRML MDRD: >90 ML/MIN/1.73M2
GLUCOSE BODY FLUID SOURCE: NORMAL
GLUCOSE FLD-MCNC: 104 MG/DL
GLUCOSE SERPL-MCNC: 106 MG/DL (ref 70–99)
GRAM STAIN RESULT: NORMAL
GRAM STAIN RESULT: NORMAL
HCT VFR BLD AUTO: 26.5 % (ref 35–47)
HCT VFR BLD AUTO: 28.2 % (ref 35–47)
HGB BLD-MCNC: 8.9 G/DL (ref 11.7–15.7)
HGB BLD-MCNC: 9.6 G/DL (ref 11.7–15.7)
IMM GRANULOCYTES # BLD: 0.4 10E3/UL
IMM GRANULOCYTES NFR BLD: 2 %
INR PPP: 1.92 (ref 0.85–1.15)
LACTATE SERPL-SCNC: 1.4 MMOL/L (ref 0.7–2)
LACTATE SERPL-SCNC: 1.4 MMOL/L (ref 0.7–2)
LACTATE SERPL-SCNC: 2.1 MMOL/L (ref 0.7–2)
LACTATE SERPL-SCNC: 2.4 MMOL/L (ref 0.7–2)
LD BODY BODY FLUID SOURCE: NORMAL
LDH FLD L TO P-CCNC: 66 U/L
LDH SERPL L TO P-CCNC: 267 U/L (ref 0–250)
LYMPHOCYTES # BLD AUTO: 5.1 10E3/UL (ref 0.8–5.3)
LYMPHOCYTES NFR BLD AUTO: 21 %
LYMPHOCYTES NFR FLD MANUAL: 40 %
MAGNESIUM SERPL-MCNC: 1.6 MG/DL (ref 1.7–2.3)
MCH RBC QN AUTO: 34.7 PG (ref 26.5–33)
MCH RBC QN AUTO: 34.8 PG (ref 26.5–33)
MCHC RBC AUTO-ENTMCNC: 33.6 G/DL (ref 31.5–36.5)
MCHC RBC AUTO-ENTMCNC: 34 G/DL (ref 31.5–36.5)
MCV RBC AUTO: 102 FL (ref 78–100)
MCV RBC AUTO: 104 FL (ref 78–100)
MONOCYTES # BLD AUTO: 2.1 10E3/UL (ref 0–1.3)
MONOCYTES NFR BLD AUTO: 9 %
MONOS+MACROS NFR FLD MANUAL: 35 %
NEUTROPHILS # BLD AUTO: 16.3 10E3/UL (ref 1.6–8.3)
NEUTROPHILS NFR BLD AUTO: 66 %
NEUTS BAND NFR FLD MANUAL: 12 %
NRBC # BLD AUTO: 0 10E3/UL
NRBC BLD AUTO-RTO: 0 /100
PHOSPHATE SERPL-MCNC: 3 MG/DL (ref 2.5–4.5)
PLAT MORPH BLD: NORMAL
PLATELET # BLD AUTO: 190 10E3/UL (ref 150–450)
PLATELET # BLD AUTO: 210 10E3/UL (ref 150–450)
POTASSIUM SERPL-SCNC: 3.8 MMOL/L (ref 3.4–5.3)
PROT FLD-MCNC: 1.6 G/DL
PROT SERPL-MCNC: 7 G/DL (ref 6.4–8.3)
PROTEIN BODY FLUID SOURCE: NORMAL
RBC # BLD AUTO: 2.56 10E6/UL (ref 3.8–5.2)
RBC # BLD AUTO: 2.77 10E6/UL (ref 3.8–5.2)
RBC MORPH BLD: NORMAL
SODIUM SERPL-SCNC: 138 MMOL/L (ref 136–145)
T4 FREE SERPL-MCNC: 1.4 NG/DL (ref 0.9–1.7)
TSH SERPL DL<=0.005 MIU/L-ACNC: 6.15 UIU/ML (ref 0.3–4.2)
WBC # BLD AUTO: 23.4 10E3/UL (ref 4–11)
WBC # BLD AUTO: 24.4 10E3/UL (ref 4–11)
WBC # FLD AUTO: 239 /UL

## 2023-07-28 PROCEDURE — 84100 ASSAY OF PHOSPHORUS: CPT | Performed by: STUDENT IN AN ORGANIZED HEALTH CARE EDUCATION/TRAINING PROGRAM

## 2023-07-28 PROCEDURE — 89051 BODY FLUID CELL COUNT: CPT | Performed by: STUDENT IN AN ORGANIZED HEALTH CARE EDUCATION/TRAINING PROGRAM

## 2023-07-28 PROCEDURE — 83605 ASSAY OF LACTIC ACID: CPT | Performed by: STUDENT IN AN ORGANIZED HEALTH CARE EDUCATION/TRAINING PROGRAM

## 2023-07-28 PROCEDURE — 120N000002 HC R&B MED SURG/OB UMMC

## 2023-07-28 PROCEDURE — 88305 TISSUE EXAM BY PATHOLOGIST: CPT | Mod: 26 | Performed by: PATHOLOGY

## 2023-07-28 PROCEDURE — 258N000003 HC RX IP 258 OP 636: Performed by: STUDENT IN AN ORGANIZED HEALTH CARE EDUCATION/TRAINING PROGRAM

## 2023-07-28 PROCEDURE — 82150 ASSAY OF AMYLASE: CPT | Performed by: STUDENT IN AN ORGANIZED HEALTH CARE EDUCATION/TRAINING PROGRAM

## 2023-07-28 PROCEDURE — 84157 ASSAY OF PROTEIN OTHER: CPT | Performed by: STUDENT IN AN ORGANIZED HEALTH CARE EDUCATION/TRAINING PROGRAM

## 2023-07-28 PROCEDURE — 86481 TB AG RESPONSE T-CELL SUSP: CPT

## 2023-07-28 PROCEDURE — 83615 LACTATE (LD) (LDH) ENZYME: CPT | Performed by: STUDENT IN AN ORGANIZED HEALTH CARE EDUCATION/TRAINING PROGRAM

## 2023-07-28 PROCEDURE — 80053 COMPREHEN METABOLIC PANEL: CPT | Performed by: STUDENT IN AN ORGANIZED HEALTH CARE EDUCATION/TRAINING PROGRAM

## 2023-07-28 PROCEDURE — 88305 TISSUE EXAM BY PATHOLOGIST: CPT | Mod: TC | Performed by: STUDENT IN AN ORGANIZED HEALTH CARE EDUCATION/TRAINING PROGRAM

## 2023-07-28 PROCEDURE — 0W9G3ZZ DRAINAGE OF PERITONEAL CAVITY, PERCUTANEOUS APPROACH: ICD-10-PCS | Performed by: PHYSICIAN ASSISTANT

## 2023-07-28 PROCEDURE — 87040 BLOOD CULTURE FOR BACTERIA: CPT | Performed by: STUDENT IN AN ORGANIZED HEALTH CARE EDUCATION/TRAINING PROGRAM

## 2023-07-28 PROCEDURE — 250N000009 HC RX 250: Performed by: FAMILY MEDICINE

## 2023-07-28 PROCEDURE — 87205 SMEAR GRAM STAIN: CPT | Performed by: STUDENT IN AN ORGANIZED HEALTH CARE EDUCATION/TRAINING PROGRAM

## 2023-07-28 PROCEDURE — 85610 PROTHROMBIN TIME: CPT | Performed by: STUDENT IN AN ORGANIZED HEALTH CARE EDUCATION/TRAINING PROGRAM

## 2023-07-28 PROCEDURE — 250N000009 HC RX 250: Performed by: PHYSICIAN ASSISTANT

## 2023-07-28 PROCEDURE — 49083 ABD PARACENTESIS W/IMAGING: CPT

## 2023-07-28 PROCEDURE — 83615 LACTATE (LD) (LDH) ENZYME: CPT

## 2023-07-28 PROCEDURE — 83735 ASSAY OF MAGNESIUM: CPT | Performed by: STUDENT IN AN ORGANIZED HEALTH CARE EDUCATION/TRAINING PROGRAM

## 2023-07-28 PROCEDURE — 87206 SMEAR FLUORESCENT/ACID STAI: CPT

## 2023-07-28 PROCEDURE — 85379 FIBRIN DEGRADATION QUANT: CPT

## 2023-07-28 PROCEDURE — 87389 HIV-1 AG W/HIV-1&-2 AB AG IA: CPT

## 2023-07-28 PROCEDURE — 82042 OTHER SOURCE ALBUMIN QUAN EA: CPT | Performed by: STUDENT IN AN ORGANIZED HEALTH CARE EDUCATION/TRAINING PROGRAM

## 2023-07-28 PROCEDURE — 250N000013 HC RX MED GY IP 250 OP 250 PS 637

## 2023-07-28 PROCEDURE — 85025 COMPLETE CBC W/AUTO DIFF WBC: CPT | Performed by: STUDENT IN AN ORGANIZED HEALTH CARE EDUCATION/TRAINING PROGRAM

## 2023-07-28 PROCEDURE — 71275 CT ANGIOGRAPHY CHEST: CPT

## 2023-07-28 PROCEDURE — 36415 COLL VENOUS BLD VENIPUNCTURE: CPT

## 2023-07-28 PROCEDURE — 99232 SBSQ HOSP IP/OBS MODERATE 35: CPT | Mod: GC | Performed by: FAMILY MEDICINE

## 2023-07-28 PROCEDURE — 250N000013 HC RX MED GY IP 250 OP 250 PS 637: Performed by: STUDENT IN AN ORGANIZED HEALTH CARE EDUCATION/TRAINING PROGRAM

## 2023-07-28 PROCEDURE — 250N000011 HC RX IP 250 OP 636: Performed by: STUDENT IN AN ORGANIZED HEALTH CARE EDUCATION/TRAINING PROGRAM

## 2023-07-28 PROCEDURE — 80143 DRUG ASSAY ACETAMINOPHEN: CPT

## 2023-07-28 PROCEDURE — 49083 ABD PARACENTESIS W/IMAGING: CPT | Performed by: PHYSICIAN ASSISTANT

## 2023-07-28 PROCEDURE — 36415 COLL VENOUS BLD VENIPUNCTURE: CPT | Performed by: STUDENT IN AN ORGANIZED HEALTH CARE EDUCATION/TRAINING PROGRAM

## 2023-07-28 PROCEDURE — 250N000011 HC RX IP 250 OP 636: Mod: JZ | Performed by: STUDENT IN AN ORGANIZED HEALTH CARE EDUCATION/TRAINING PROGRAM

## 2023-07-28 PROCEDURE — 71275 CT ANGIOGRAPHY CHEST: CPT | Mod: 26 | Performed by: RADIOLOGY

## 2023-07-28 PROCEDURE — 82248 BILIRUBIN DIRECT: CPT

## 2023-07-28 PROCEDURE — 250N000011 HC RX IP 250 OP 636: Mod: JZ | Performed by: FAMILY MEDICINE

## 2023-07-28 PROCEDURE — 258N000003 HC RX IP 258 OP 636

## 2023-07-28 PROCEDURE — 82945 GLUCOSE OTHER FLUID: CPT | Performed by: STUDENT IN AN ORGANIZED HEALTH CARE EDUCATION/TRAINING PROGRAM

## 2023-07-28 PROCEDURE — 84439 ASSAY OF FREE THYROXINE: CPT

## 2023-07-28 PROCEDURE — 87070 CULTURE OTHR SPECIMN AEROBIC: CPT | Performed by: STUDENT IN AN ORGANIZED HEALTH CARE EDUCATION/TRAINING PROGRAM

## 2023-07-28 PROCEDURE — 272N000500 HC NEEDLE CR2

## 2023-07-28 PROCEDURE — 84443 ASSAY THYROID STIM HORMONE: CPT

## 2023-07-28 PROCEDURE — 83010 ASSAY OF HAPTOGLOBIN QUANT: CPT

## 2023-07-28 PROCEDURE — 85027 COMPLETE CBC AUTOMATED: CPT | Performed by: STUDENT IN AN ORGANIZED HEALTH CARE EDUCATION/TRAINING PROGRAM

## 2023-07-28 RX ORDER — IOPAMIDOL 755 MG/ML
100 INJECTION, SOLUTION INTRAVASCULAR ONCE
Status: DISCONTINUED | OUTPATIENT
Start: 2023-07-28 | End: 2023-07-30 | Stop reason: HOSPADM

## 2023-07-28 RX ORDER — FOLIC ACID 1 MG/1
1 TABLET ORAL DAILY
Status: DISCONTINUED | OUTPATIENT
Start: 2023-07-28 | End: 2023-07-30 | Stop reason: HOSPADM

## 2023-07-28 RX ORDER — IOPAMIDOL 755 MG/ML
100 INJECTION, SOLUTION INTRAVASCULAR ONCE
Status: COMPLETED | OUTPATIENT
Start: 2023-07-28 | End: 2023-07-28

## 2023-07-28 RX ORDER — CEFTRIAXONE 2 G/1
2 INJECTION, POWDER, FOR SOLUTION INTRAMUSCULAR; INTRAVENOUS EVERY 24 HOURS
Status: DISCONTINUED | OUTPATIENT
Start: 2023-07-28 | End: 2023-07-30 | Stop reason: HOSPADM

## 2023-07-28 RX ORDER — MULTIPLE VITAMINS W/ MINERALS TAB 9MG-400MCG
1 TAB ORAL DAILY
Status: DISCONTINUED | OUTPATIENT
Start: 2023-07-28 | End: 2023-07-30 | Stop reason: HOSPADM

## 2023-07-28 RX ORDER — HYDROXYZINE HYDROCHLORIDE 25 MG/1
25 TABLET, FILM COATED ORAL EVERY 6 HOURS PRN
Status: DISCONTINUED | OUTPATIENT
Start: 2023-07-28 | End: 2023-07-28

## 2023-07-28 RX ORDER — OXYCODONE HYDROCHLORIDE 5 MG/1
5 TABLET ORAL ONCE
Status: COMPLETED | OUTPATIENT
Start: 2023-07-28 | End: 2023-07-28

## 2023-07-28 RX ORDER — HYDROXYZINE HYDROCHLORIDE 50 MG/1
50 TABLET, FILM COATED ORAL EVERY 6 HOURS PRN
Status: DISCONTINUED | OUTPATIENT
Start: 2023-07-28 | End: 2023-07-28

## 2023-07-28 RX ORDER — DIAZEPAM 10 MG
10 TABLET ORAL ONCE
Status: COMPLETED | OUTPATIENT
Start: 2023-07-28 | End: 2023-07-28

## 2023-07-28 RX ADMIN — FOLIC ACID 1 MG: 1 TABLET ORAL at 10:03

## 2023-07-28 RX ADMIN — SODIUM CHLORIDE 90 ML: 9 INJECTION, SOLUTION INTRAVENOUS at 12:27

## 2023-07-28 RX ADMIN — IBUPROFEN 600 MG: 600 TABLET, FILM COATED ORAL at 20:23

## 2023-07-28 RX ADMIN — MULTIPLE VITAMINS W/ MINERALS TAB 1 TABLET: TAB at 10:03

## 2023-07-28 RX ADMIN — LIDOCAINE HYDROCHLORIDE 5 ML: 10 INJECTION, SOLUTION EPIDURAL; INFILTRATION; INTRACAUDAL; PERINEURAL at 12:46

## 2023-07-28 RX ADMIN — CEFTRIAXONE SODIUM 2 G: 2 INJECTION, POWDER, FOR SOLUTION INTRAMUSCULAR; INTRAVENOUS at 03:26

## 2023-07-28 RX ADMIN — SODIUM CHLORIDE, POTASSIUM CHLORIDE, SODIUM LACTATE AND CALCIUM CHLORIDE 500 ML: 600; 310; 30; 20 INJECTION, SOLUTION INTRAVENOUS at 10:20

## 2023-07-28 RX ADMIN — THIAMINE HYDROCHLORIDE 500 MG: 100 INJECTION, SOLUTION INTRAMUSCULAR; INTRAVENOUS at 16:23

## 2023-07-28 RX ADMIN — THIAMINE HYDROCHLORIDE 500 MG: 100 INJECTION, SOLUTION INTRAMUSCULAR; INTRAVENOUS at 20:23

## 2023-07-28 RX ADMIN — OXYCODONE HYDROCHLORIDE 5 MG: 5 TABLET ORAL at 21:45

## 2023-07-28 RX ADMIN — DIAZEPAM 10 MG: 10 TABLET ORAL at 02:47

## 2023-07-28 RX ADMIN — THIAMINE HYDROCHLORIDE 500 MG: 100 INJECTION, SOLUTION INTRAMUSCULAR; INTRAVENOUS at 08:58

## 2023-07-28 RX ADMIN — IOPAMIDOL 72 ML: 755 INJECTION, SOLUTION INTRAVENOUS at 12:24

## 2023-07-28 RX ADMIN — SODIUM CHLORIDE, POTASSIUM CHLORIDE, SODIUM LACTATE AND CALCIUM CHLORIDE 500 ML: 600; 310; 30; 20 INJECTION, SOLUTION INTRAVENOUS at 05:20

## 2023-07-28 ASSESSMENT — ACTIVITIES OF DAILY LIVING (ADL)
ADLS_ACUITY_SCORE: 20
DEPENDENT_IADLS:: INDEPENDENT
ADLS_ACUITY_SCORE: 20

## 2023-07-28 NOTE — PROGRESS NOTES
"[facility] MEDICINE   BRIEF PROGRESS NOTE    10:17PM  Briefly, Ms Castano is a 28F who was admitted for EtOH detox. Overnight I have noticed she is quite tachycardic. ED EKG reviewed - sinus tachy. Since admission her HR as averaged in the 130s, and her SBP in the 140s. She notes this is not her usual.    OBJECTIVE:  BP (!) 146/86 (BP Location: Right arm)   Pulse (!) 145   Temp 99.3  F (37.4  C) (Oral)   Resp 18   Ht 1.588 m (5' 2.5\")   Wt 99.3 kg (219 lb)   SpO2 96%   BMI 39.42 kg/m      On exam she denies an sxs of chest pain / dyspnea / syncope / vision blur / headache. Cardiac exam is noted for regular tachycardia, difficult to appreciate murmur but one does appear to be present (likely flow murmur). Respiratory exam is benign. Neuro exam is benign. She does NOT appear to be fluid down (moist conjunctiva, moist oral mucus membranes) - however she reports she does feel dehydrated. She has received a total of 10mg diazepam and 2mg lorazepam since admission.    # Alcohol withdrawal   # Tachycardia  Most likely this tachycardia is in the presence of active EtOH detox, though presently no other symptoms and patient appears quite comfortable in room with family. 8PM labs are returning - only notable for a slightl drop in her Na to 135. K is stable at 4.1. Glucose is 104.   Place on telemetry overnight, monitor tachycardia closely in setting of EtOh detox  1L LR bolus now given tachycardia     12:00AM  Notified by RN that tele may be showing signs of SVT, but patient is asymptomatic. I went to review the active tele readings at the charge RN desk and it appears less SVT like and more sinus tachycardia (distinct p and t waves are present). I went to see patient who also reports feeling no symptoms outside of a faster heart rate.     We performed a set of brief vagal maneuvers to assess if this is SVT vs Sinus Tachy. Using the incentive spirometry did indeed demonstrate a noticeable decrease of HR, but upon " cessation it would slowly return to sinus tachy. Still, this is more re-assuring against SVT. The underlying reasons for her sinus tachycardia at this time are still considered to be related to EtOH detox - but consideration must be given to other causes:    Infectious causes (did have leukocytosis on admission, RUQ did show small ascities and suspicious for cirrhosis so SBP must be considered, but leukocytosis could also be seen in pure EtOH w/d); metabolic causes (however her chemistries have been stable, namely her potassium); other cardiogenic (most worrisome would be PE - however no dyspnea, no chest pain, and hemodynamically stable); neurogenic (however no neuro deficits are noted); hematologic (she did have an hgb of 9.7 and elevated INR - though no signs of active bleeding).     # Alcohol withdrawal   # Tachycardia  Continue to monitor at this time     2:30AM  Notified by RN that patient wanted something for anxiety. I reviewed the tele-strip again, it again shows tachycardia in the 130s. She is not scoring high enough on CIWA to trigger dose response - most recent score was at 12AM and was 4, prior to this it was at 9PM and was 2.     #Anxiety  #Tachycardia  Given our monitoring for withdrawal, the known tachycardia - I will ok a one time diazepam 10mg (equivalent to a first dose response off CIWA).     Vitals were updated and we now see a temperature of 100.6 - first time she has met fever threshold. Per RN pt was wrapped up in blanket and felt warm. This could be part of an EtOH detox process - but patient has yet to score on CIWA - though has complained of anxiety as above. Another consideration must be made for SBP - her admission RUQ US did show signs of small volume ascites, suspicious for cirrhosis, and her admission labs did have a leukocytosis at 24. I went to assess patient with RN - she does state she as some abdominal pain and this has been on and off since admit. She is quite warm to touch as  well, but not diaphoretic. She is AAOx3.     #Abdominal Pain  #SIRs Criteria (Tachycardia, Febrile)   Obtain stat lactic, CBC, and blood cx  Will start ppx tx for SBP in consideration of all of the above findings - CTX 2g Q24hr, start AFTER we get collect the blood culture  Will hold PPI for now given ppx for SBP (PPI have been shown to increase risk for SBP)    4:30AM  Lactic returned JUST elevated at 2.4. She has already received 1L LR bolus. We have started CTX.    #Elevated Lactic  #Prophylactic tx for SBP  Add repeat lactic w/ AM labs  Will give a small 500mL LR bolus now - noted she has small volume ascites so we do not want to fluid overload here    Brennan Keene, DO

## 2023-07-28 NOTE — CONSULTS
Interventional Radiology Consult Note    Patient is on IR schedule for a diagnostic paracentesis. Requesting team to place all diagnostic labs.  Labs WNL for procedure.    No NPO required.  Medications to be held include: none  Consent will be done prior to procedure.     Please [place] IR charge at [phone] for estimated time of procedure.     Case discussed with requesting James team.    Jackson Naranjo PA-C  Interventional Radiology  Phone: [phone]  Pager: [phone]

## 2023-07-28 NOTE — CONSULTS
7/28/2023  CD Consult acknowledged.  Staff will attempt to see pt for CD Consult Monday 7/31/2023.  If pt discharges prior to consult, they can call [place] at [phone] to schedule an OP TURNER Assessment.    Betsy Bowser MA [facility]  TURNER Evaluation Counselor  [phone]  [email or web address]

## 2023-07-28 NOTE — PROGRESS NOTES
"VS: BP (!) 146/86 (BP Location: Right arm)   Pulse (!) 145   Temp 99.3  F (37.4  C) (Oral)   Resp 18   Ht 1.588 m (5' 2.5\")   Wt 99.3 kg (219 lb)   SpO2 96%   BMI 39.42 kg/m       O2: Stable on room air, denies chest pain/SOB   Output: Voids spontaneously in bathroom   Last BM: 7/27 per patient   Activity: Up independently in room   Up for meals? No   Skin: All visible skin intact   Pain: Patient denies pain   CMS: AO x4, Denies N/T  Last CIWA score: 2   Dressing: None   Diet: Regular diet, denies N/V/D   LDA: PIV to L forearm running LR bolus at 1000mL/hr   Equipment: IV pole/pump, call light w/in reach, personal belongings remain w/ patient   Plan: Continue to monitor   Additional Info:        "

## 2023-07-28 NOTE — PROGRESS NOTES
"  VS: /64 (BP Location: Right arm, Patient Position: Supine, Cuff Size: Adult Regular)   Pulse (!) 137   Temp 100  F (37.8  C) (Oral)   Resp 18   Ht 1.588 m (5' 2.5\")   Wt 99.3 kg (219 lb)   SpO2 93%   BMI 39.42 kg/m      Pt tachycardic.    O2: Stable on RA. Denies SOB, chest pain and dizziness.    Output: Continent of Bowel and bladder.    Last BM: 07/28. Medium sized and formed.    Activity: Independent.    Skin: All visible skin intact.    Pain: Pt reports pain at 7. Intermittent and located in the abdomen. Declined medication - stated wanted to wait till it got worse.    CMS: AOx4. Denied N/T and N/V.    Dressing: No dressing.    Diet: Regular Diet.    LDA: L PIV SL.   Equipment: Seizure pads ordered.    Plan: Follow POC.    Additional Info: CIWA scores for the shift:   4 @ 0000  2 @ 0500    Most recent Lactic Acid for shift 2.1.         "

## 2023-07-28 NOTE — PROGRESS NOTES
"[id number]    Plan of Care Reviewed With: Patient    Overall Patient Progress: No Change    Outcome Evaluation: Pt is A & O x4 on RA. Pt c/o 8/10 abdominal pain - administered PRN Ibuprofen & Roxicodone. Denies nausea, chest pain & SOB. Pt has R PIV intermittently infusing thiamine infusions otherwise SL. UTV/PIOTR abdominal incision - dressing CDI. Upon skin assessment, pt is jaundice. Pt is independent, voids spontaneously & uses call light appropriately.    Shift Updates  CIWA score at 1623 & 2027 was 0.  Pt is on tele - strip analyzed, printed & placed in pt chart.  L PIV removed during shift & new R PIV placed by RN flyer.  After writer administered PRN Ibuprofen, applied warm blankets & offered other interventions - pt still c/o abdominal pain, MD notified. Per new order, administered 1x dose of 5 mg of Roxicodone.     Vitals: /88 (BP Location: Right arm, Patient Position: Semi-Mccoy's, Cuff Size: Adult Regular)   Pulse (!) 128   Temp 98.8  F (37.1  C) (Oral)   Resp 18   Ht 1.588 m (5' 2.5\")   Wt 99.3 kg (219 lb)   SpO2 97%   BMI 39.42 kg/m      Plan: TBD, possible discharge 7/29. Pt had paracentesis during AM shift. Continue w/ plan of care.   "

## 2023-07-28 NOTE — PROGRESS NOTES
"[id number]    Pt is alert O X4  on Room air. Patient is independent in room and able to make needs known. Patient denies chest pain, Nausea, Vomiting.    Patient has LPIV  saline lock      Continue with plan of care    BP (!) 149/88 (BP Location: Left arm)   Pulse (!) 133   Temp 98.6  F (37  C) (Oral)   Resp 18   Ht 1.588 m (5' 2.5\")   Wt 99.3 kg (219 lb)   SpO2 96%   BMI 39.42 kg/m     "

## 2023-07-28 NOTE — PROGRESS NOTES
[facility]    Progress Note - [facility]       Date of Admission:  7/27/2023    Major Updates  - Diagnostic paracentesis  - CTPE  - CXR  - Continue [facility] protocol  - UA, blood cultures, hemolysis labs pending  - [facility] protocol    Assessment & Plan   Priya Castano is a 28 year old female admitted on 7/27/2023. She has a past medical history significant for hypothyroidism and latent TB and presented to the ED seeking detox and was found to have likely acute alcoholic hepatitis, being worked up for SBP.    # Tachycardia  # Tachypnea  # Sepsis criteria met with lactic acidosis  HR persistently elevated to 130s as well as mild fever to 100.6 and O2 levels between 93-97%. Leukocytosis to 23.4,  (downtrending), ALT 43, alk phos 274 (downtrending), bilirubin 4.5. D dimer 12.5. Met sepsis overnight with lactate elevated to 2.4, resolved with fluid resuscitation. Likely inflammatory picture is secondary to alcoholic hepatitis however cannot rule out other sources of infection, coagulopathy, PE. No formal diagnosis of cirrhosis but patient's US shows changes consistent with chronic liver disease/cirrhosis. She additionally has diffuse abdominal discomfort and distension that has increased over the past 24h, with ascites noted on US 7/27, raising suspicion for SBP as possible source of infection. S/p one dose CTX. With tachycardia, tachypnea, mildly decreased O2, and elevated d-dimer PE on the differential although no unilateral LE swelling. Hemolysis also on the differential with elevated d-dimer and indirect bilirubin. No urinary symptoms and no CVA tenderness, UTI unlikely. Reassuringly no hypotension. EKG shows sinus tachycardia.  - Diagnostic paracentesis today  - S/p one dose CTX overnight, will consider continuing/discontinuing depending on paracentesis results  - CTPE  - Urinalysis pending  - CXR ordered  - hemolysis labs pending  - blood  culture pending  - on telemetry  - PPI held while ruling out SBP    # Mild acute alcoholic hepatitis  # Jaundice, scleral icterus  # Sepsis (fever, tachycardia, lactic acidosis)  Patient presenting with fever, tachycardia, lactic acidosis, hepatomegaly, hyperbilirubinemia, and small volume ascites in setting of heavy alcohol use. Bilirubin 3.7, , ALT 52, alk phos 304. RUQ US findings reveal enlarged liver with diffusely increased echogenicity and nodular contour, and small volume ascites throughout the abdomen and pelvis. Fever, tachycardia, liver labs and hematomegaly most consistent with alcoholic hepatitis. Glascow alcoholic hepatitis score of 7. Hepatitis A, B, and C testing is negative and patient is immunized against Hep A and Hep B. Acetaminophen level negative. She additionally has a history of untreated latent tuberculosis, unlikely cause of her liver dysfunction but will obtain a quant gold nonetheless to help rule out. No sign of ductal dilatation or obstruction on US. Patient is on no medications that could be contributing to her condition. No history of cirrhosis although nodular contour of liver may indicate progressing towards cirrhosis. No history or evidence of variceal/GI bleeding or previous instances of SBP, so no indication for prophylaxis at this time. Hepatitis discriminant function index -10, no indication for glucocorticoids at this time. Factors with the best ability to predict cirrhosis in adults (without a liver biopsy for definitive diagnosis) include presence of ascites (small volume in this patient), platelet count <160 (268 in our patient), spinder angiomata, and a Bonacini cirrhosis discriminant score >7 (this patient's score is 4). Patient with generalized abdominal discomfort and small volume ascites, certainly SBP on the differential although less likely with no diagnosis of cirrhosis.  - Offer Pneumococcal vaccine, indicated with liver disease  - Consider screening for  esophageal varices via upper GI  - Connection with outpatient hepatology for every 3 month monitoring    MELD 3.0: 22 at 7/27/2023  9:36 PM  MELD-Na: 20 at 7/27/2023  9:36 PM  Calculated from:  Serum Creatinine: 0.42 mg/dL (Using min of 1 mg/dL) at 7/27/2023  9:36 PM  Serum Sodium: 135 mmol/L at 7/27/2023  9:36 PM  Total Bilirubin: 3.7 mg/dL at 7/27/2023 10:37 AM  Serum Albumin: 2.7 g/dL at 7/27/2023 10:37 AM  INR(ratio): 1.86 at 7/27/2023  1:55 PM  Age at listing (hypothetical): 28 years  Sex: Female at 7/27/2023  9:36 PM      # Alcohol withdrawal  Patient with last reported use 7/26 around 2000. History of several other periods of withdraw, and no history of withdrawal seizures or psychosis. States that her family is already working towards placement in a combined inpatient/outpatient treatment facility once detox is complete. She has some understanding of her disease process, but would likely benefit from a more extensive conversation of the damage alcohol inflicts to her body. She denies history of STI and substance use other than alcohol. UDS was negative. Could consider adding clonidine and/or gabapentin for symptomatic management as needed.   - CIWA protocol with valium   - Wernicke's Thiamine Protocol  - Seizure precautions/pad  - Folate/Multivitamin  - Chem dep referral    #Coagulation Defect  INR at admit was 1.86, likely sequale from alcoholic liver disease. Will monitor while inpatient.      # Mild alcoholic keotacidosis  # At Risk for Refeeding Syndrome  - Daily mag, phos, lytes  - Mag, Phos, and K RN managed replacement protocols     #Anemia, Normocytic - borderline macrocytic  Likely a combination of long term alcohol use and nutritional deficiency. Will monitor with daily CBC. Platelet count normal at this time.         Diet: Combination Diet Regular Diet Adult    DVT Prophylaxis: Ambulate every shift  Short Catheter: Not present  Fluids: PO  Lines: None     Cardiac Monitoring: ACTIVE order.  "Indication: Tachycardia, monitoring for EtOH withdrawal.  Code Status: Full Code      Clinically Significant Risk Factors Present on Admission              # Hypoalbuminemia: Lowest albumin = 2.7 g/dL at 7/27/2023 10:37 AM, will monitor as appropriate  # Coagulation Defect: INR = 1.86 (Ref range: 0.85 - 1.15) and/or PTT = 35 Seconds (Ref range: 22 - 38 Seconds), will monitor for bleeding         # Obesity: Estimated body mass index is 39.42 kg/m  as calculated from the following:    Height as of this encounter: 1.588 m (5' 2.5\").    Weight as of this encounter: 99.3 kg (219 lb).            Disposition Plan      Expected Discharge Date: 07/29/2023                The patient's care was discussed with the Attending Physician, Dr. Rachelle Francois .    Angie Wagner MD  [facility]  [facility]  Securely message with [employer] (more info)  Text page via [employer] Correspondent Paging/Directory   See signed in provider for up to date coverage information  ______________________________________________________________________    Interval History   Patient admitted overnight. Triggered sepsis criteria with fever, tachycardia, leukocytosis and lactic acidosis. Fluid resuscitated and started on CTX. EKG showed sinus tachycardia.     Physical Exam   Vital Signs: Temp: 100  F (37.8  C) Temp src: Oral BP: 129/64 Pulse: (!) 137   Resp: 18 SpO2: 93 % O2 Device: None (Room air)    Weight: 219 lbs 0 oz    Constitutional: awake, alert, cooperative, no apparent distress, and appears stated age  Eyes: Lids and lashes normal, pupils equal, round and reactive to light, scleral icterus present  ENT: normocepalic, without obvious abnormality  Hematologic / Lymphatic: no cervical lymphadenopathy and no supraclavicular lymphadenopathy  Respiratory: No increased work of breathing, good air exchange, clear to auscultation bilaterally, no crackles or wheezing  Cardiovascular: Normal apical impulse, " regular rate and rhythm, normal S1 and S2, no S3 or S4, and no murmur noted  GI: soft, distended and mildly generalized tenderness  Musculoskeletal: 1+ pitting edema to the knees bilaterally  Neurologic: Awake, alert, oriented to name, place and time.  Cranial nerves II-XII are grossly intact.  Motor is 5 out of 5 bilaterally.  Sensory is intact.        Data     I have personally reviewed the following data over the past 24 hrs:    23.4 (H)  \   9.6 (L)   / 210     138 105 2.3 (L) /  106 (H)   3.8 24 0.49 (L) \     ALT: 43 AST: 146 (H) AP: 274 (H) TBILI: 4.5 (H)   ALB: 2.4 (L) TOT PROTEIN: 7.0 LIPASE: N/A     Procal: N/A CRP: N/A Lactic Acid: 1.4; 1.4       INR:  1.92 (H) PTT:  35   D-dimer:  12.25 (H) Fibrinogen:  N/A       Imaging results reviewed over the past 24 hrs:   Recent Results (from the past 24 hour(s))   US Abdomen Limited (RUQ)    Narrative    US ABDOMEN LIMITED 7/27/2023 12:54 PM    CLINICAL HISTORY: elevated bilirubin in patient with alcohol use  disorder.  TECHNIQUE: Limited abdominal ultrasound.    COMPARISON: None available.    FINDINGS:    GALLBLADDER: The gallbladder is not pathologically distended. There is  significant wall edema without gallstones or pericholecystic fluid.  Negative sonographic Morrison's sign.    BILE DUCTS: There is no intrahepatic biliary dilatation. The common  duct measures 3 mm.    LIVER: Enlarged, measuring up to 23 cm, with diffusely increased  echogenicity. Nodular contour noted.    RIGHT KIDNEY: No hydronephrosis.    PANCREAS: The visualized portions of the pancreas are normal.    Small volume ascites throughout the abdomen and pelvis.      Impression    IMPRESSION:  1.  Morphologic changes suspicious for chronic liver  disease/cirrhosis. Small volume ascites.  2.  Hepatomegaly with severe hepatic steatosis and gallbladder edema,  could be seen with superimposed acute hepatitis.  3.  No evidence of biliary obstruction.    BENJA PALMER MD         SYSTEM ID:   [id number]

## 2023-07-28 NOTE — CONSULTS
Care Management Initial Consult    General Information  Assessment completed with: Priya Looney  Type of CM/SW Visit: Initial Assessment    Primary Care Provider verified and updated as needed: No (Wants PCP)   Readmission within the last 30 days: no previous admission in last 30 days      Reason for Consult: discharge planning  Advance Care Planning: Advance Care Planning Reviewed: no concerns identified          Communication Assessment  Patient's communication style: spoken language (English or Bilingual)    Hearing Difficulty or Deaf: no   Wear Glasses or Blind: no    Cognitive  Cognitive/Neuro/Behavioral: WDL                      Living Environment:   People in home: significant other     Current living Arrangements: apartment      Able to return to prior arrangements: yes       Family/Social Support:  Care provided by: self  Provides care for: no one  Marital Status: Lives with Significant Other  Significant Other          Description of Support System: Supportive, Involved         Current Resources:   Patient receiving home care services: No     Community Resources: None  Equipment currently used at home: none  Supplies currently used at home: None    Employment/Financial:  Employment Status: employed full-time        Financial Concerns: No concerns identified   Referral to Financial Worker: Yes       Does the patient's insurance plan have a 3 day qualifying hospital stay waiver?  No    Lifestyle & Psychosocial Needs:  Social Determinants of Health     Tobacco Use: Unknown (7/27/2023)    Patient History     Smoking Tobacco Use: Never     Smokeless Tobacco Use: Unknown     Passive Exposure: Not on file   Alcohol Use: Not on file   Financial Resource Strain: Not on file   Food Insecurity: Not on file   Transportation Needs: Not on file   Physical Activity: Not on file   Stress: Not on file   Social Connections: Not on file   Intimate Partner Violence: Not on file   Depression: Not on file   Housing Stability:  "Not on file       Functional Status:  Prior to admission patient needed assistance:   Dependent ADLs:: Independent  Dependent IADLs:: Independent       Mental Health Status:No concerns.          Chemical Dependency Status: She struggles with alcohol use. She would like to find OP treatment. She has never been to treatment before.                Values/Beliefs:  Spiritual, Cultural Beliefs, Jainism Practices, Values that affect care: no               Additional Information:    Per hospitalist- \"Priya Castano is a 28 year old female with a past medical history significant for hypothyroidism and latent TB who presents to the Emergency Department seeking detox. Patient states she has been drinking one bottle a wine daily for the past two months. Her last drink was yesterday night. She does not feel any withdrawal symptoms other than feeling a little dehydrated. She denies any other substance use. She denies feeling any suicidal ideation. She states she has been drinking on and off for a long time. She has never been through detox and is planning outpatient treatment after a family intervention. She denies any abdominal pain but, states she feels bloated. She denies any chance of pregnancy and states she has irregular periods. Her last period was 6 weeks ago.  She is here with her mother and a counselor.\"    Writer met with patient at bedside to complete the CMA. She requested to have a PCP set up. She was living in an apartment with her boyfriend before. She can return there again. Her family and friends can give her a ride. She also drives and has a car. Her family and friends are very supportive.    She requested to see a financial counselor also. Writer gave her information for this.    CARLINE Grullon, LGSW  6 Med Surg   [facility]  Phone: [phone]  Pager: [phone]        "

## 2023-07-28 NOTE — PROCEDURES
Interventional Radiology Brief Post Procedure Note    Procedure: IR PARACENTESIS    Proceduralist: Jackson Naranjo PA-C    Assistant: None    Time Out: Prior to the start of the procedure and with procedural staff participation, I verbally confirmed the patient s identity using two indicators, relevant allergies, that the procedure was appropriate and matched the consent or emergent situation, and that the correct equipment/implants were available. Immediately prior to starting the procedure I conducted the Time Out with the procedural staff and re-confirmed the patient s name, procedure, and site/side. (The Joint Commission universal protocol was followed.)  Yes    Medications   Medication Event Details Admin User Admin Time       Sedation: None. Local Anesthestic used    Findings: Completed image guided diagnostic and therapeutic paracentesis - a total of 4150 ml of clear yellow ascites removed. Specimen sent to lab for testing. Patient tolerated procedure well.     Estimated Blood Loss: Minimal    SPECIMENS: Fluid and/or tissue for laboratory analysis    Complications: 1. None     Condition: Stable    Plan: Follow-up per primary team.    Comments: See dictated procedure note for full details.    Jackson Naranjo PA-C

## 2023-07-29 LAB
ALBUMIN SERPL BCG-MCNC: 2.1 G/DL (ref 3.5–5.2)
ALP SERPL-CCNC: 227 U/L (ref 35–104)
ALT SERPL W P-5'-P-CCNC: 34 U/L (ref 0–50)
ANION GAP SERPL CALCULATED.3IONS-SCNC: 9 MMOL/L (ref 7–15)
AST SERPL W P-5'-P-CCNC: 104 U/L (ref 0–45)
BILIRUB SERPL-MCNC: 3.1 MG/DL
BUN SERPL-MCNC: 3.8 MG/DL (ref 6–20)
CALCIUM SERPL-MCNC: 7.6 MG/DL (ref 8.6–10)
CHLORIDE SERPL-SCNC: 107 MMOL/L (ref 98–107)
CREAT SERPL-MCNC: 0.55 MG/DL (ref 0.51–0.95)
DEPRECATED HCO3 PLAS-SCNC: 23 MMOL/L (ref 22–29)
ERYTHROCYTE [DISTWIDTH] IN BLOOD BY AUTOMATED COUNT: 18.1 % (ref 10–15)
FIBRINOGEN PPP-MCNC: 154 MG/DL (ref 170–490)
GFR SERPL CREATININE-BSD FRML MDRD: >90 ML/MIN/1.73M2
GLUCOSE SERPL-MCNC: 96 MG/DL (ref 70–99)
HCT VFR BLD AUTO: 24.7 % (ref 35–47)
HGB BLD-MCNC: 8.4 G/DL (ref 11.7–15.7)
HIV 1+2 AB+HIV1 P24 AG SERPL QL IA: NONREACTIVE
INR PPP: 1.96 (ref 0.85–1.15)
MAGNESIUM SERPL-MCNC: 1.7 MG/DL (ref 1.7–2.3)
MCH RBC QN AUTO: 34.6 PG (ref 26.5–33)
MCHC RBC AUTO-ENTMCNC: 34 G/DL (ref 31.5–36.5)
MCV RBC AUTO: 102 FL (ref 78–100)
PHOSPHATE SERPL-MCNC: 3.6 MG/DL (ref 2.5–4.5)
PLATELET # BLD AUTO: 180 10E3/UL (ref 150–450)
POTASSIUM SERPL-SCNC: 3.9 MMOL/L (ref 3.4–5.3)
PROT SERPL-MCNC: 6 G/DL (ref 6.4–8.3)
RBC # BLD AUTO: 2.43 10E6/UL (ref 3.8–5.2)
SODIUM SERPL-SCNC: 139 MMOL/L (ref 136–145)
WBC # BLD AUTO: 20 10E3/UL (ref 4–11)

## 2023-07-29 PROCEDURE — 83735 ASSAY OF MAGNESIUM: CPT | Performed by: FAMILY MEDICINE

## 2023-07-29 PROCEDURE — 85027 COMPLETE CBC AUTOMATED: CPT | Performed by: STUDENT IN AN ORGANIZED HEALTH CARE EDUCATION/TRAINING PROGRAM

## 2023-07-29 PROCEDURE — 120N000002 HC R&B MED SURG/OB UMMC

## 2023-07-29 PROCEDURE — 99232 SBSQ HOSP IP/OBS MODERATE 35: CPT | Mod: GC | Performed by: FAMILY MEDICINE

## 2023-07-29 PROCEDURE — 250N000013 HC RX MED GY IP 250 OP 250 PS 637: Performed by: STUDENT IN AN ORGANIZED HEALTH CARE EDUCATION/TRAINING PROGRAM

## 2023-07-29 PROCEDURE — 85384 FIBRINOGEN ACTIVITY: CPT | Performed by: FAMILY MEDICINE

## 2023-07-29 PROCEDURE — 250N000011 HC RX IP 250 OP 636: Performed by: STUDENT IN AN ORGANIZED HEALTH CARE EDUCATION/TRAINING PROGRAM

## 2023-07-29 PROCEDURE — 80053 COMPREHEN METABOLIC PANEL: CPT | Performed by: STUDENT IN AN ORGANIZED HEALTH CARE EDUCATION/TRAINING PROGRAM

## 2023-07-29 PROCEDURE — 36415 COLL VENOUS BLD VENIPUNCTURE: CPT | Performed by: STUDENT IN AN ORGANIZED HEALTH CARE EDUCATION/TRAINING PROGRAM

## 2023-07-29 PROCEDURE — 258N000003 HC RX IP 258 OP 636: Performed by: STUDENT IN AN ORGANIZED HEALTH CARE EDUCATION/TRAINING PROGRAM

## 2023-07-29 PROCEDURE — 250N000011 HC RX IP 250 OP 636: Mod: JZ | Performed by: STUDENT IN AN ORGANIZED HEALTH CARE EDUCATION/TRAINING PROGRAM

## 2023-07-29 PROCEDURE — 250N000013 HC RX MED GY IP 250 OP 250 PS 637

## 2023-07-29 PROCEDURE — 85610 PROTHROMBIN TIME: CPT | Performed by: STUDENT IN AN ORGANIZED HEALTH CARE EDUCATION/TRAINING PROGRAM

## 2023-07-29 PROCEDURE — 84100 ASSAY OF PHOSPHORUS: CPT | Performed by: FAMILY MEDICINE

## 2023-07-29 RX ORDER — OXYCODONE HYDROCHLORIDE 5 MG/1
5 TABLET ORAL ONCE
Status: COMPLETED | OUTPATIENT
Start: 2023-07-29 | End: 2023-07-29

## 2023-07-29 RX ADMIN — MULTIPLE VITAMINS W/ MINERALS TAB 1 TABLET: TAB at 08:45

## 2023-07-29 RX ADMIN — OXYCODONE HYDROCHLORIDE 5 MG: 5 TABLET ORAL at 18:13

## 2023-07-29 RX ADMIN — IBUPROFEN 600 MG: 600 TABLET, FILM COATED ORAL at 16:36

## 2023-07-29 RX ADMIN — THIAMINE HYDROCHLORIDE 500 MG: 100 INJECTION, SOLUTION INTRAMUSCULAR; INTRAVENOUS at 08:45

## 2023-07-29 RX ADMIN — FOLIC ACID 1 MG: 1 TABLET ORAL at 08:45

## 2023-07-29 RX ADMIN — CEFTRIAXONE SODIUM 2 G: 2 INJECTION, POWDER, FOR SOLUTION INTRAMUSCULAR; INTRAVENOUS at 03:19

## 2023-07-29 ASSESSMENT — ACTIVITIES OF DAILY LIVING (ADL)
ADLS_ACUITY_SCORE: 20

## 2023-07-29 NOTE — PROGRESS NOTES
"0700 -1900     Patient is alert orient x 4 on room air.     She is in dependant and able to make needs known.   She denies, nausea,vomiting ,SOB    She C/O  about abdominal pain,  PRN ibuprofen , oxycodone administered as prescribed.   She showered independently  during shift.    Pt has RP IV saline lock     Plan TBD tomorrow.          /80 (BP Location: Left arm)   Pulse 118   Temp 98  F (36.7  C) (Oral)   Resp 16   Ht 1.588 m (5' 2.5\")   Wt 99.3 kg (219 lb)   SpO2 99%   BMI 39.42 kg/m     "

## 2023-07-29 NOTE — DISCHARGE INSTRUCTIONS
Please follow up with your outpatient PCP in 5-7 days for lab check.    Please make an appointment with gastroenterology to monitor and treat your liver.

## 2023-07-29 NOTE — DISCHARGE SUMMARY
"[facility]  Discharge Summary - Medicine & Pediatrics       Date of Admission:  7/27/2023  Date of Discharge:  7/30/2023  Discharging Provider: Rachelle Francois DO  Discharge Service: [facility]    Discharge Diagnoses   # Alcoholic hepatitis  # Alcohol withdrawal  # Alcohol use disorder  # Possible cirrhosis  # Ascites  # Possible severe sepsis (with possible source of infection)  # Transaminitis  # Mild alcoholic ketoacidosis  # Normocytic anemia    Clinically Significant Risk Factors     # Obesity: Estimated body mass index is 39.42 kg/m  as calculated from the following:    Height as of this encounter: 1.588 m (5' 2.5\").    Weight as of this encounter: 99.3 kg (219 lb).       Follow-ups Needed After Discharge   -Recommend follow up with PCP in 5-7 days. Please evaluate for abdominal distension/ concern for return of ascites. Recommend obtaining CMP and CBC. TSH elevated inpatient in context of acute inflammation, recommend TSH recheck when inflammation resolves.    -Recommend establishing care with GI to workup and manage early cirrhosis.    Unresulted Labs Ordered in the Past 30 Days of this Admission       Date and Time Order Name Status Description    7/28/2023  1:44 PM Acid-Fast Bacilli Culture and Stain In process     7/28/2023 11:15 AM Haptoglobin In process     7/28/2023  9:57 AM Quantiferon TB Gold Plus Purple Tube In process     7/28/2023  9:57 AM Quantiferon TB Gold Plus Yellow Tube In process     7/28/2023  9:57 AM Quantiferon TB Gold Plus Green Tube In process     7/28/2023  9:57 AM Quantiferon TB Gold Plus Grey Tube In process     7/28/2023  9:47 AM Ascites Fluid Aerobic Bacterial Culture Routine Preliminary     7/28/2023  9:47 AM Cytology, non-gynecologic In process     7/28/2023  2:58 AM Blood Culture Hand, Right Preliminary     7/28/2023  2:58 AM Blood Culture Arm, Left Preliminary         These results will be followed up by Dr." "Rachelle Francois    Discharge Disposition   Discharged to [facility] via family transportation  Condition at discharge: Stable    Hospital Course   Priya Castano is a 28 year old female admitted on 7/27/2023. She has a past medical history significant for hypothyroidism and latent TB and presented to the ED seeking detox and was found to have acute alcoholic hepatitis with enlarged liver seen on ultrasound. She underwent paracentesis and four liters fluid was drained and sent for analysis. Workup was negative for infection and her vitals/inflammatory labs improved with alcohol abstinence and drainage of fluid. She was discharged home with plans for close follow up with outpatient hepatology.    The following problems were addressed during her hospitalization:       # Mild acute alcoholic hepatitis  # Jaundice, scleral icterus  # Ascites s/p paracentesis  # Early cirrhosis  Patient presented for alcohol withdrawal, found to have elevated liver enzymes and enlarged liver on RUQUS. US showed \"liver enlarged, measuring up to 23 cm, with diffusely increased echogenicity. Nodular contour noted.\" She was distended on exam and underwent paracentesis with the removal of 4L ascites fluid on 7/28/23 with improvement of her symptoms. Ascites fluid significant for negative gram stain, 239 nucleated cells (40% lymph), total protein 1.6, and SAAG of 4 pointing towards etiology of cirrhosis/hepatitis as cause of her ascites. No history of cirrhosis although nodular contour of liver may indicate progressing towards cirrhosis. Hepatitis A, B, and C testing is negative and patient is immunized against Hep A and Hep B. Acetaminophen level negative. She additionally has a history of untreated latent tuberculosis, unlikely cause of her liver dysfunction but will obtain a quant gold nonetheless to help rule out. No history or evidence of variceal/GI bleeding. Although she does have significant alcohol use, she " has only been drinking daily for a few months, possible etiology of early cirrhosis is multifactorial including hepatic steatosis. Outpatient GI referral placed, recommend close follow up for early cirrhosis.    MELD 3.0: 20 at 7/30/2023  6:39 AM  MELD-Na: 17 at 7/30/2023  6:39 AM  Calculated from:  Serum Creatinine: 0.58 mg/dL (Using min of 1 mg/dL) at 7/30/2023  6:39 AM  Serum Sodium: 139 mmol/L (Using max of 137 mmol/L) at 7/30/2023  6:39 AM  Total Bilirubin: 2.6 mg/dL at 7/30/2023  6:39 AM  Serum Albumin: 2.1 g/dL at 7/30/2023  6:39 AM  INR(ratio): 1.84 at 7/30/2023  6:39 AM  Age at listing (hypothetical): 28 years  Sex: Female at 7/30/2023  6:39 AM      # Pleural effusion  # SIRS criteria met with lactic acidosis   HR persistently elevated to 130s as well as mild fever to 100.6 and O2 levels between 93-97%. Leukocytosis to 23.4,  (downtrending), ALT 43, alk phos 274 (downtrending), bilirubin 4.5. D dimer 12.5. She received 2 doses of CTX for concern for SBP with ascites and abdominal discomfort, however ascites cultures were negative, CTX discontinued. Vital signs downtrended after paracentesis and patient remained well appearing. CTPE negative for PE, positive for L sided pleural effusion and YINKA ground glass opacities. Patient with COVID infection several months ago, no lingering symptoms including no cough, ground glass opacities possibly secondary to recent covid infection. Pleural effusion likely secondary to her ascites. She has a history of untreated LTBI however no cough, night sweats, weight loss, fluctuating fevers making reactivation unlikely. Quant gold pending at time of discharge.     # Alcohol withdrawal  Drinks ~a bottle of wine a day. Received one dose of valium for anxiety, otherwise was without withdrawal symptoms or need for medication treatment. Planning to go from hospital to inpatient substance use treatment, her family found her a spot at [facility] to which she was  dischaged.    #Coagulation Defect  INR at admit was 1.86, likely sequale from alcoholic liver disease. Stable while inpatient.      # Mild alcoholic keotacidosis  Electrolytes stable while inpatient, ketoacidosis resolved.     #Anemia, Normocytic - borderline macrocytic  Likely a combination of long term alcohol use and nutritional deficiency. Will monitor with daily CBC. Platelet count normal at this time.     Consultations This Hospital Stay   CARE MANAGEMENT / SOCIAL WORK IP CONSULT  INTERVENTIONAL RADIOLOGY ADULT/PEDS IP CONSULT  CHEMICAL DEPENDENCY IP CONSULT  CARE MANAGEMENT / SOCIAL WORK IP CONSULT    Code Status   Full Code       The patient was discussed with Dr. Alessandro Corbett, DO [facility]  [facility] MED SURG  [address] [place] [id number]  Phone: [phone]  Fax: [phone]  ___________          Physical Exam  Vital Signs: Temp: 97.7  F (36.5  C) Temp src: Oral BP: 121/54 Pulse: 72   Resp: 20 SpO2: 98 % O2 Device: Nasal cannula    Weight: 219 lbs 0 oz     Constitutional: awake, alert, cooperative, no apparent distress, and appears stated age  Eyes: Lids and lashes normal, pupils equal, round and reactive to light, scleral icterus present  ENT: normocepalic, without obvious abnormality  Hematologic / Lymphatic: no cervical lymphadenopathy and no supraclavicular lymphadenopathy  Respiratory: No increased work of breathing, good air exchange, clear to auscultation bilaterally, no crackles or wheezing  Cardiovascular: Normal apical impulse, regular rate and rhythm, normal S1 and S2, no S3 or S4, and no murmur noted  GI: soft, improved distension and non-tender  Musculoskeletal: 1+ pitting edema to the knees bilaterally  Neurologic: Awake, alert, oriented to name, place and time.  Cranial nerves II-XII are grossly intact.  Motor is 5 out of 5 bilaterally.  Sensory is intact.           Primary Care Physician   Physician No  Ref-Primary    Discharge Orders      Adult GI  Referral - Consult Only      Primary Care Referral      General info for SNF    Length of Stay Estimate: Short Term Care: Estimated # of Days <30  Condition at Discharge: Improving  Level of care:board and care  Rehabilitation Potential: Excellent  Admission H&P remains valid and up-to-date: Yes  Recent Chemotherapy: N/A  Use Nursing Home Standing Orders: Yes     Mantoux instructions    Give two-step Mantoux (PPD) Per Facility Policy Yes     Follow Up and recommended labs and tests    Follow up with primary care provider in 5-7 days.  The following labs/tests are recommended: CMP, CBC, TSH.     Reason for your hospital stay    You were seen in the hospital to undergo alcohol withdrawal. You were found to have hepatitis which means a large, inflamed liver, likely caused by alcohol intake. Your liver imaging also showed some signs of early liver failure, likely due both to alcohol intake and to     Activity - Up ad nando     Full Code     Diet    Follow this diet upon discharge: Orders Placed This Encounter      2 Gram Sodium Diet       Significant Results and Procedures   Results for orders placed or performed during the hospital encounter of 07/27/23   US Abdomen Limited (RUQ)    Narrative    US ABDOMEN LIMITED 7/27/2023 12:54 PM    CLINICAL HISTORY: elevated bilirubin in patient with alcohol use  disorder.  TECHNIQUE: Limited abdominal ultrasound.    COMPARISON: None available.    FINDINGS:    GALLBLADDER: The gallbladder is not pathologically distended. There is  significant wall edema without gallstones or pericholecystic fluid.  Negative sonographic Morrison's sign.    BILE DUCTS: There is no intrahepatic biliary dilatation. The common  duct measures 3 mm.    LIVER: Enlarged, measuring up to 23 cm, with diffusely increased  echogenicity. Nodular contour noted.    RIGHT KIDNEY: No hydronephrosis.    PANCREAS: The visualized portions of the pancreas are  normal.    Small volume ascites throughout the abdomen and pelvis.      Impression    IMPRESSION:  1.  Morphologic changes suspicious for chronic liver  disease/cirrhosis. Small volume ascites.  2.  Hepatomegaly with severe hepatic steatosis and gallbladder edema,  could be seen with superimposed acute hepatitis.  3.  No evidence of biliary obstruction.    BENJA PALMER MD         SYSTEM ID:  [id number]   CT Chest Pulmonary Embolism w Contrast    Narrative    EXAMINATION: CTA pulmonary angiogram, 7/28/2023 12:46 PM     COMPARISON: None.    HISTORY: tachycardia, tachypnea, elevated d-dimer, c/f PE tachycardia,  tachypnea, elevated d-dimer, c/f PE    TECHNIQUE: Volumetric helical acquisition of CT images of the chest  from the lung apices to the kidneys were acquired after the  administration of 100 mL of Isovue-370 IV contrast. .  Post-processed  multiplanar and/or MIP reformations were obtained, archived to PACS  and used in interpretation of this study.     FINDINGS:    Contrast bolus is: suboptimal.  Exam is very limited or compromised in  the evaluation for acute pulmonary embolism. Following contrast makes  it especially in the left-sided pulmonary artery tree with  indeterminate hypodensity in the left lower lobar level (series 4  image 106) again likely artifact rather than actual thrombus.    Chest:   Unremarkable thyroid. Unremarkable esophagus. Cardiomegaly. No  significant pericardial effusion. Thoracic aortic caliber within  normal limits. Pulmonary artery caliber upper normal size  approximately 3.2 cm. No suspicious thoracic lymph nodes. Anatomic  variant bovine aortic arch with left common carotid artery originating  off the base of the right brachiocephalic artery.    Lungs:    Moderate left pleural effusion with adjacent atelectasis. No  pneumothorax. Patent tracheobronchial tree. Dependent atelectasis. Low  lung volumes. Mild interlobular septal thickening and scattered  groundglass opacities  especially visible in the left upper lobe.    Right lower lobe calcified granuloma. No suspicious pulmonary nodule.    Abdomen: Examination of the upper abdomen is limited. Diffuse hepatic  hypoattenuation. Partially visualized ascites.    Bones: No suspicious osseous lesion. Minimal thoracolumbar  degenerative change.    Soft Tissues: No suspicious mass.      Impression    IMPRESSION:   1.  Suboptimal contrast timing. No large central pulmonary embolism.  2.  Cardiomegaly with mild pulmonary edema and left moderate pleural  effusion suggestive of fluid overload.  3.  Hepatic steatosis.  4.  Partially visualized ascites.    In the event of a positive result for acute pulmonary embolism  resulting in right heart strain, consider calling the   [facility] hospital  for PERT (Pulmonary Embolism Response Team)  Activation?    PERT -- Pulmonary Embolism Response Team (Multidisciplinary team  including cardiology, interventional radiology, critical care,  hematology)    I have personally reviewed the examination and initial interpretation  and I agree with the findings.    TREVON SANFORD MD         SYSTEM ID:  [id number]       Discharge Medications   There are no discharge medications for this patient.    Allergies   No Known Allergies

## 2023-07-29 NOTE — PROGRESS NOTES
"  VS: /62 (BP Location: Left arm, Patient Position: Semi-Mccoy's, Cuff Size: Adult Regular)   Pulse 118   Temp 98.3  F (36.8  C) (Oral)   Resp 25   Ht 1.588 m (5' 2.5\")   Wt 99.3 kg (219 lb)   SpO2 95%   BMI 39.42 kg/m      Pt tachycardic most of the shift.    O2: Stable on RA. Denies SOB, chest pain and dizziness.    Output: Continent of bowel and bladder.    Last BM: 07/28 - per pt report   Activity: Independent in room.    Skin: Visible skin intact except incision on the LR abdomen.    Pain: Pt denied pain. Reported some discomfort in the Abd with movement intermittently.    CMS: AO x4 Denies nausea/vomiting and numbness/tingling.    Dressing: Dressing on the LR abdomen CDI.    Diet: 2G Sodium Diet.    LDA: R PIV SL.    Equipment: Call lights and pt belongings in reach. Seizure pads in place.    Plan: Follow POC.    Additional Info: CIWA scores this shift:  1 and 0     Pt on TELE strip analyzed, printed and placed in pt chart.           "

## 2023-07-29 NOTE — PROGRESS NOTES
[facility]    Progress Note - [facility]       Date of Admission:  7/27/2023    Major Updates  - Discontinue CIWA protocol  - Discontinue telemetry  - Follow up pending labs  - Likely discharge 7/30/31  - Hold CTX    Assessment & Plan   Priya Castano is a 28 year old female admitted on 7/27/2023. She has a past medical history significant for hypothyroidism and latent TB and presented to the ED seeking detox and was found to have likely acute alcoholic hepatitis.    # Mild acute alcoholic hepatitis  # Jaundice, scleral icterus  # Ascites s/p paracentesis  # Early cirrhosis  Patient presenting with fever, tachycardia, lactic acidosis, hepatomegaly, hyperbilirubinemia, and small volume ascites in setting of heavy alcohol use. Bilirubin 3.7, , ALT 52, alk phos 304. RUQ US findings reveal enlarged liver with diffusely increased echogenicity and nodular contour, and small volume ascites throughout the abdomen and pelvis. Initially fever, tachycardia, liver labs and hematomegaly most consistent with alcoholic hepatitis. Glascow alcoholic hepatitis score of 7. Hepatitis A, B, and C testing is negative and patient is immunized against Hep A and Hep B. Acetaminophen level negative. She additionally has a history of untreated latent tuberculosis, unlikely cause of her liver dysfunction but will obtain a quant gold nonetheless to help rule out. No sign of ductal dilatation or obstruction on US. Patient is on no medications that could be contributing to her condition. No history of cirrhosis although nodular contour of liver may indicate progressing towards cirrhosis. No history or evidence of variceal/GI bleeding or previous instances of SBP, so no indication for prophylaxis at this time. She is s/p paracentesis 7/28 with 4L fluid pulled off. Ascites shows nucleated cells 239 (40% lymph), total protein 1.6, and SAAG of 4 pointing towards etiology  of cirrhosis/hepatitis as cause of her ascites. Although she does have significant alcohol use, she has only been drinking daily for a few months, possible etiology of early cirrhosis is multifactorial including hepatic steatosis.  - Offer Pneumococcal vaccine, indicated with liver disease  - Outpatient GI follow up, consider screening for esophageal varices outpatient given new possible cirrhosis    # Tachycardia--resolve   # Tachypnea--resolved  # SIRS criteria met with lactic acidosis  # Pleural effusion  HR persistently elevated to 130s as well as mild fever to 100.6 and O2 levels between 93-97%. Leukocytosis to 23.4,  (downtrending), ALT 43, alk phos 274 (downtrending), bilirubin 4.5. D dimer 12.5. Met sepsis criteria night of 7/27/23 with lactate elevated to 2.4, resolved with fluid resuscitation. Likely inflammatory picture is secondary to alcoholic hepatitis however also on the differential is other sources of infection, coagulopathy, PE. No formal diagnosis of cirrhosis but patient's US shows changes consistent with chronic liver disease/cirrhosis. She is s/p paracentesis 7/28 with 4L pulled off, nucleated cells 239 (40% lymph), total protein 1.6, and SAAG of 4 pointing towards etiology of cirrhosis/hepatitis as cause of her ascites. Gram stain negative and culture negative thus far, low concern for SBP given resolution of abdominal discomfort and ascites analysis results (although notably diagnostics run after abx started). CTPE negative for PE, positive for L sided pleural effusion and YINKA ground glass opacities. Patient with COVID infection several months ago, no lingering symptoms including no cough, ground glass opacities possibly secondary to recent covid infection. Pleural effusion likely secondary to her ascites. VSS since yesterday, patient very well appearing, no ongoing concern for infection. She does have a history of untreated TB however no cough, night sweats, weight loss, fluctuating  "fevers making reactivation unlikely.  - quant gold pending  - continue to monitor bcx, ascites cx  - discontinue CTX  - discontinue telemtry  - q8h vitals    # Alcohol withdrawal  Patient with last reported use 7/26 around 2000. History of several other periods of withdraw, and no history of withdrawal seizures or psychosis. Drinks ~a bottle of wine a day. CIWA scores of 0 for 24 hours, CIWA discontinued. Planning to go from hospital to inpatient substance use treatment, her family found her a spot and they will take her tomorrow. Plan to discharge morning of 7/30/23 to treatment.  - Wernicke's Thiamine Protocol  - Folate/Multivitamin  - CIWA discontinued    #Coagulation Defect  INR at admit was 1.86, likely sequale from alcoholic liver disease. Will monitor while inpatient.      # Mild alcoholic keotacidosis  # At Risk for Refeeding Syndrome  - Daily mag, phos, lytes  - Mag, Phos, and K RN managed replacement protocols     #Anemia, Normocytic - borderline macrocytic  Likely a combination of long term alcohol use and nutritional deficiency. Will monitor with daily CBC. Platelet count normal at this time.         Diet: 2 Gram Sodium Diet    DVT Prophylaxis: Ambulate every shift  Short Catheter: Not present  Fluids: PO  Lines: None     Cardiac Monitoring: None  Code Status: Full Code      Clinically Significant Risk Factors            # Hypomagnesemia: Lowest Mg = 1.6 mg/dL in last 2 days, will replace as needed   # Hypoalbuminemia: Lowest albumin = 2.1 g/dL at 7/29/2023  6:25 AM, will monitor as appropriate    # Coagulation Defect: INR = 1.96 (Ref range: 0.85 - 1.15) and/or PTT = 35 Seconds (Ref range: 22 - 38 Seconds), will monitor for bleeding           # Obesity: Estimated body mass index is 39.42 kg/m  as calculated from the following:    Height as of this encounter: 1.588 m (5' 2.5\").    Weight as of this encounter: 99.3 kg (219 lb).  , PRESENT ON ADMISSION          Disposition Plan     Expected Discharge Date: " 08/01/2023        Discharge Comments: Acute alcoholic hepatitis and monitoring for alcohol withdrawal.        The patient's care was discussed with the Attending Physician, Dr. Rachelle Francois .    Angie Wagner MD  [facility]  [facility]  Securely message with Vocera (more info)  Text page via [facility] Paging/Directory   See signed in provider for up to date coverage information  ______________________________________________________________________    Interval History   Patient feeling improved overnight after fluid was removed. No SOB, cough, night sweats. No N/V. Appetite normal.     Physical Exam   Vital Signs: Temp: 97.7  F (36.5  C) Temp src: Oral BP: 121/54 Pulse: 72   Resp: 20 SpO2: 98 % O2 Device: Nasal cannula    Weight: 219 lbs 0 oz    Constitutional: awake, alert, cooperative, no apparent distress, and appears stated age  Eyes: Lids and lashes normal, pupils equal, round and reactive to light, scleral icterus present  ENT: normocepalic, without obvious abnormality  Hematologic / Lymphatic: no cervical lymphadenopathy and no supraclavicular lymphadenopathy  Respiratory: No increased work of breathing, good air exchange, clear to auscultation bilaterally, no crackles or wheezing  Cardiovascular: Normal apical impulse, regular rate and rhythm, normal S1 and S2, no S3 or S4, and no murmur noted  GI: soft, improved distension and non-tender  Musculoskeletal: 1+ pitting edema to the knees bilaterally  Neurologic: Awake, alert, oriented to name, place and time.  Cranial nerves II-XII are grossly intact.  Motor is 5 out of 5 bilaterally.  Sensory is intact.        Data     I have personally reviewed the following data over the past 24 hrs:    20.0 (H)  \   8.4 (L)   / 180     139 107 3.8 (L) /  96   3.9 23 0.55 \     ALT: 34 AST: 104 (H) AP: 227 (H) TBILI: 3.1 (H)   ALB: 2.1 (L) TOT PROTEIN: 6.0 (L) LIPASE: N/A     TSH: N/A T4: N/A A1C: N/A      INR:  1.96 (H) PTT:  N/A   D-dimer:  N/A Fibrinogen:  154 (L)       Imaging results reviewed over the past 24 hrs:   No results found for this or any previous visit (from the past 24 hour(s)).

## 2023-07-30 VITALS
RESPIRATION RATE: 18 BRPM | OXYGEN SATURATION: 100 % | HEART RATE: 100 BPM | HEIGHT: 63 IN | SYSTOLIC BLOOD PRESSURE: 109 MMHG | BODY MASS INDEX: 38.8 KG/M2 | WEIGHT: 219 LBS | DIASTOLIC BLOOD PRESSURE: 70 MMHG | TEMPERATURE: 98 F

## 2023-07-30 LAB
ALBUMIN SERPL BCG-MCNC: 2.1 G/DL (ref 3.5–5.2)
ALP SERPL-CCNC: 221 U/L (ref 35–104)
ALT SERPL W P-5'-P-CCNC: 33 U/L (ref 0–50)
ANION GAP SERPL CALCULATED.3IONS-SCNC: 5 MMOL/L (ref 7–15)
AST SERPL W P-5'-P-CCNC: 124 U/L (ref 0–45)
BILIRUB SERPL-MCNC: 2.6 MG/DL
BUN SERPL-MCNC: 5.6 MG/DL (ref 6–20)
CALCIUM SERPL-MCNC: 7.6 MG/DL (ref 8.6–10)
CHLORIDE SERPL-SCNC: 109 MMOL/L (ref 98–107)
CREAT SERPL-MCNC: 0.58 MG/DL (ref 0.51–0.95)
DEPRECATED HCO3 PLAS-SCNC: 25 MMOL/L (ref 22–29)
ERYTHROCYTE [DISTWIDTH] IN BLOOD BY AUTOMATED COUNT: 18.6 % (ref 10–15)
GAMMA INTERFERON BACKGROUND BLD IA-ACNC: 0.1 IU/ML
GFR SERPL CREATININE-BSD FRML MDRD: >90 ML/MIN/1.73M2
GLUCOSE SERPL-MCNC: 101 MG/DL (ref 70–99)
HCT VFR BLD AUTO: 25.4 % (ref 35–47)
HGB BLD-MCNC: 8.5 G/DL (ref 11.7–15.7)
INR PPP: 1.84 (ref 0.85–1.15)
M TB IFN-G BLD-IMP: NEGATIVE
M TB IFN-G CD4+ BCKGRND COR BLD-ACNC: 4.01 IU/ML
MAGNESIUM SERPL-MCNC: 1.8 MG/DL (ref 1.7–2.3)
MCH RBC QN AUTO: 34.3 PG (ref 26.5–33)
MCHC RBC AUTO-ENTMCNC: 33.5 G/DL (ref 31.5–36.5)
MCV RBC AUTO: 102 FL (ref 78–100)
MITOGEN IGNF BCKGRD COR BLD-ACNC: 0.06 IU/ML
MITOGEN IGNF BCKGRD COR BLD-ACNC: 0.08 IU/ML
PHOSPHATE SERPL-MCNC: 3.9 MG/DL (ref 2.5–4.5)
PLATELET # BLD AUTO: 193 10E3/UL (ref 150–450)
POTASSIUM SERPL-SCNC: 4.1 MMOL/L (ref 3.4–5.3)
PROT SERPL-MCNC: 6 G/DL (ref 6.4–8.3)
QUANTIFERON MITOGEN: 4.11 IU/ML
QUANTIFERON NIL TUBE: 0.1 IU/ML
QUANTIFERON TB1 TUBE: 0.18 IU/ML
QUANTIFERON TB2 TUBE: 0.16
RBC # BLD AUTO: 2.48 10E6/UL (ref 3.8–5.2)
SODIUM SERPL-SCNC: 139 MMOL/L (ref 136–145)
WBC # BLD AUTO: 18.2 10E3/UL (ref 4–11)

## 2023-07-30 PROCEDURE — 250N000011 HC RX IP 250 OP 636: Performed by: STUDENT IN AN ORGANIZED HEALTH CARE EDUCATION/TRAINING PROGRAM

## 2023-07-30 PROCEDURE — 85610 PROTHROMBIN TIME: CPT | Performed by: STUDENT IN AN ORGANIZED HEALTH CARE EDUCATION/TRAINING PROGRAM

## 2023-07-30 PROCEDURE — 258N000003 HC RX IP 258 OP 636: Performed by: STUDENT IN AN ORGANIZED HEALTH CARE EDUCATION/TRAINING PROGRAM

## 2023-07-30 PROCEDURE — 36415 COLL VENOUS BLD VENIPUNCTURE: CPT | Performed by: STUDENT IN AN ORGANIZED HEALTH CARE EDUCATION/TRAINING PROGRAM

## 2023-07-30 PROCEDURE — 84100 ASSAY OF PHOSPHORUS: CPT | Performed by: FAMILY MEDICINE

## 2023-07-30 PROCEDURE — 80053 COMPREHEN METABOLIC PANEL: CPT | Performed by: STUDENT IN AN ORGANIZED HEALTH CARE EDUCATION/TRAINING PROGRAM

## 2023-07-30 PROCEDURE — 250N000013 HC RX MED GY IP 250 OP 250 PS 637

## 2023-07-30 PROCEDURE — 99239 HOSP IP/OBS DSCHRG MGMT >30: CPT | Mod: GC | Performed by: FAMILY MEDICINE

## 2023-07-30 PROCEDURE — 85014 HEMATOCRIT: CPT | Performed by: STUDENT IN AN ORGANIZED HEALTH CARE EDUCATION/TRAINING PROGRAM

## 2023-07-30 PROCEDURE — 83735 ASSAY OF MAGNESIUM: CPT | Performed by: FAMILY MEDICINE

## 2023-07-30 RX ADMIN — FOLIC ACID 1 MG: 1 TABLET ORAL at 08:00

## 2023-07-30 RX ADMIN — THIAMINE HYDROCHLORIDE 250 MG: 100 INJECTION, SOLUTION INTRAMUSCULAR; INTRAVENOUS at 08:01

## 2023-07-30 RX ADMIN — MULTIPLE VITAMINS W/ MINERALS TAB 1 TABLET: TAB at 08:00

## 2023-07-30 RX ADMIN — ONDANSETRON 4 MG: 4 TABLET, ORALLY DISINTEGRATING ORAL at 12:15

## 2023-07-30 ASSESSMENT — ACTIVITIES OF DAILY LIVING (ADL)
ADLS_ACUITY_SCORE: 20

## 2023-07-30 NOTE — PROGRESS NOTES
Care Management Follow Up    Length of Stay (days): 2    Expected Discharge Date: 7/30/2023     Concerns to be Addressed:       Patient plan of care discussed at interdisciplinary rounds: Yes    Anticipated Discharge Disposition: Home     Anticipated Discharge Services: None  Anticipated Discharge DME: None    Education Provided on the Discharge Plan:    Patient/Family in Agreement with the Plan: yes     Private pay costs discussed: Not applicable    Additional Information:  Priya Castano is a 28 year old female admitted on 7/27/2023. She has a past medical history significant for hypothyroidism and latent TB and presented to the ED seeking detox and was found to have likely acute alcoholic hepatitis, being worked up for SBP.     Discussed CD treatment.Patient indicated she is already accepted at [facility] in [place], [place].  Her family will transport her to treatment from the hospital.  Writer discussed with [facility] medical team and learned patient should be able to discharge tomorrow.  Patient informed.    DEMOND Lepe  Text paging available through [facility] on [employer] - search SOCIAL WORK     Sat/Sun ON CALL PAGER   0800 - 1600   [phone]     Sat/Sun ON CALL COVERAGE AFTER 1600 - midnight [phone]

## 2023-07-30 NOTE — PROGRESS NOTES
"[id number]    Patient is alert orient X 4. She is independent in room air and able to make needs known.    She denies Sob, Pain.   She C/O about nausea, Prn zofran administered.    Plan to discharge today.  /70 (BP Location: Right arm, Patient Position: Semi-Mccoy's, Cuff Size: Adult Regular)   Pulse 100   Temp 98  F (36.7  C) (Oral)   Resp 18   Ht 1.588 m (5' 2.5\")   Wt 99.3 kg (219 lb)   SpO2 100%   BMI 39.42 kg/m       "

## 2023-07-30 NOTE — PLAN OF CARE
"1900 - 0700      Blood pressure 101/74, pulse 111, temperature 97.7  F (36.5  C), temperature source Oral, resp. rate 16, height 1.588 m (5' 2.5\"), weight 99.3 kg (219 lb), SpO2 95 %, not currently breastfeeding.         Please refer to flowsheet for full patient assessment and MAR for all medications administered during shift.        Pt A&Ox4, VSS, Fall precautions maintained with bed locked and in lowest position. No complaint of pain. POC discussed, questions encouraged and answered. Plan for today TBD. No acute events during shift. Frequent round and checks done. POC continues.                         "

## 2023-07-30 NOTE — PLAN OF CARE
"Goal Outcome Evaluation:      Plan of Care Reviewed With: patient  6MS DISCHARGE    D: Patient discharged to home at 1320. Patient accompanied by  transport with wheelchair.    I: Discharge prescriptions given to patient. All discharge medications and instructions reviewed with patient . Patient instructed to seek care if experiencing worsening symptoms, such as pain. Other phone numbers to call with questions or concerns after discharge reviewed. Iv's removed. Education completed.    A:Patient  verbalized understanding of discharge medications and instructions. Prescribed home medications given to patient.  Belongings returned to patient.    P: Patient to follow-up on primary care with a week  /70 (BP Location: Right arm, Patient Position: Semi-Mccoy's, Cuff Size: Adult Regular)   Pulse 100   Temp 98  F (36.7  C) (Oral)   Resp 18   Ht 1.588 m (5' 2.5\")   Wt 99.3 kg (219 lb)   SpO2 100%   BMI 39.42 kg/m                             "

## 2023-07-31 LAB — HAPTOGLOB SERPL-MCNC: 81 MG/DL (ref 32–197)

## 2023-08-01 LAB
PATH REPORT.COMMENTS IMP SPEC: NORMAL
PATH REPORT.FINAL DX SPEC: NORMAL
PATH REPORT.GROSS SPEC: NORMAL
PATH REPORT.RELEVANT HX SPEC: NORMAL

## 2023-08-02 LAB
BACTERIA BLD CULT: NO GROWTH
BACTERIA BLD CULT: NO GROWTH
BACTERIA FLD CULT: NO GROWTH

## 2023-08-08 ENCOUNTER — OFFICE VISIT (OUTPATIENT)
Dept: FAMILY MEDICINE | Facility: CLINIC | Age: 28
End: 2023-08-08

## 2023-08-08 VITALS
SYSTOLIC BLOOD PRESSURE: 137 MMHG | HEART RATE: 119 BPM | RESPIRATION RATE: 16 BRPM | TEMPERATURE: 97.9 F | OXYGEN SATURATION: 100 % | WEIGHT: 223.6 LBS | DIASTOLIC BLOOD PRESSURE: 89 MMHG | HEIGHT: 63 IN | BODY MASS INDEX: 39.62 KG/M2

## 2023-08-08 DIAGNOSIS — K70.11 ALCOHOLIC HEPATITIS WITH ASCITES (H): ICD-10-CM

## 2023-08-08 DIAGNOSIS — Z09 HOSPITAL DISCHARGE FOLLOW-UP: Primary | ICD-10-CM

## 2023-08-08 DIAGNOSIS — E06.3 HYPOTHYROIDISM DUE TO HASHIMOTO'S THYROIDITIS: ICD-10-CM

## 2023-08-08 DIAGNOSIS — D64.9 ANEMIA, UNSPECIFIED TYPE: ICD-10-CM

## 2023-08-08 LAB
ERYTHROCYTE [DISTWIDTH] IN BLOOD BY AUTOMATED COUNT: 17.9 % (ref 10–15)
HCT VFR BLD AUTO: 27.3 % (ref 35–47)
HGB BLD-MCNC: 8.9 G/DL (ref 11.7–15.7)
INR PPP: 1.49 (ref 0.85–1.15)
MCH RBC QN AUTO: 33 PG (ref 26.5–33)
MCHC RBC AUTO-ENTMCNC: 32.6 G/DL (ref 31.5–36.5)
MCV RBC AUTO: 101 FL (ref 78–100)
PLATELET # BLD AUTO: 294 10E3/UL (ref 150–450)
RBC # BLD AUTO: 2.7 10E6/UL (ref 3.8–5.2)
WBC # BLD AUTO: 16.4 10E3/UL (ref 4–11)

## 2023-08-08 PROCEDURE — 99204 OFFICE O/P NEW MOD 45 MIN: CPT | Performed by: FAMILY MEDICINE

## 2023-08-08 PROCEDURE — 36415 COLL VENOUS BLD VENIPUNCTURE: CPT | Performed by: FAMILY MEDICINE

## 2023-08-08 PROCEDURE — 85610 PROTHROMBIN TIME: CPT | Performed by: FAMILY MEDICINE

## 2023-08-08 PROCEDURE — 86376 MICROSOMAL ANTIBODY EACH: CPT | Performed by: FAMILY MEDICINE

## 2023-08-08 PROCEDURE — 80053 COMPREHEN METABOLIC PANEL: CPT | Performed by: FAMILY MEDICINE

## 2023-08-08 PROCEDURE — 85027 COMPLETE CBC AUTOMATED: CPT | Performed by: FAMILY MEDICINE

## 2023-08-08 PROCEDURE — 84439 ASSAY OF FREE THYROXINE: CPT | Performed by: FAMILY MEDICINE

## 2023-08-08 PROCEDURE — 84443 ASSAY THYROID STIM HORMONE: CPT | Performed by: FAMILY MEDICINE

## 2023-08-08 RX ORDER — SPIRONOLACTONE 50 MG/1
50 TABLET, FILM COATED ORAL DAILY
Qty: 30 TABLET | Refills: 1 | Status: SHIPPED | OUTPATIENT
Start: 2023-08-08 | End: 2023-10-10

## 2023-08-08 RX ORDER — FUROSEMIDE 20 MG
20 TABLET ORAL DAILY
Qty: 30 TABLET | Refills: 1 | Status: SHIPPED | OUTPATIENT
Start: 2023-08-08 | End: 2023-09-29

## 2023-08-08 NOTE — PROGRESS NOTES
Assessment/ Plan     1. Hospital discharge follow-up  Priya presents as a new patient for hospital follow-up.  She was hospitalized at the [facility] from 7/27 through 7/30 for alcohol withdrawal and alcohol hepatitis with ascites.  Did have paracentesis removing about 4 L.  she only required 1 dose of Valium and did not any other issues.  Since discharge, she has been in an inpatient alcohol rehab center called [facility].  She will be discharged on August 29.  She has follow-up with a therapist and also AA meetings.  She will be living with her fiancé who is supportive of her efforts.  She has been sober since her hospitalization.  She feels good about her sobriety.  Unfortunately, she does feel like the fluid has reaccumulated.  They did not have her doing any weights at home.  It just feels a little bit uncomfortable and more pressure.  They did not send her home on any diuretics for unknown reasons.  He has follow-up with a gastroenterologist later this week via virtual visit.  I recommend we start on a lower dose of spironolactone and furosemide at 50 mg / 20 mg.  Her blood pressure is in the high normal range so I do not think she will have any issues with orthostatic hypotension.  She can then follow-up with gastroenterology if things are feeling any better with starting this medication and I will defer to them if they want to increase the dose or do anything different for another possible paracentesis.  We did note that she has not had any preventative cares really in many years.  She is never had a Pap smear and she is due for some immunizations.  When things have settled down, she will follow-up with me for a complete physical exam with a Pap.    2. Alcoholic hepatitis with ascites  She has had recurrence of the ascites, recommend starting a combination of spironolactone and Lasix.  She is due for repeat blood work today.  - spironolactone (ALDACTONE) 50 MG tablet; Take 1 tablet (50 mg) by  mouth daily  Dispense: 30 tablet; Refill: 1  - furosemide (LASIX) 20 MG tablet; Take 1 tablet (20 mg) by mouth daily  Dispense: 30 tablet; Refill: 1  - CBC with platelets; Future  - Comprehensive metabolic panel; Future  - INR; Future  - CBC with platelets  - Comprehensive metabolic panel  - INR    3. Hypothyroidism due to Hashimoto's thyroiditis  She has a past history of Hashimoto's hypothyroidism.  She had been on Synthroid in the past and stopped taking it for unclear reasons.  Will recheck labs today and make recommendations based on this.  She has had fatigue.  - TSH with free T4 reflex; Future  - Thyroid peroxidase antibody; Future  - TSH with free T4 reflex  - Thyroid peroxidase antibody    4. Anemia  Her hemoglobin is 8.9 today with an MCV of 101.  Repeat this when she comes back for her physical and likely do some additional labs.  This may be a nutritional versus other.    Subjective:      Priya Castano is a 28 year old female who presents for [facility] care and hospital follow-up.  She is new to me today.  She was recently hospitalized for detox but found to be in acute hepatitis with ascites.  She states that her family did an intervention and found her rehab facility.  She states she has been drinking fairly heavily for about a year and a half.  She states that addiction does run in her family.  Her mom had  and so she was mostly raised by her aunt and uncle.  They are very supportive along with her fiancé.  I reviewed the discharge summary and all of her imaging.  While in the hospital, they did do a paracentesis and removed about 4 L of fluid.  I cannot really tell from the hospital information why she was not put on any diuretics but she states she feels like things are back to where they were and it is quite uncomfortable.  They were not having her check any weights or anything.  She has been sober since she was admitted to the hospital.  She is in a rehab facility called [facility].  They  "have her set up it sounds like for good outpatient when she lays on August 29.  She will see a therapist and will be plugged into AA meetings.  She understands the serious consequences of continuing drinking.  I did review her medical chart and there is not much in their past about 2015.  It looks like she had been diagnosed in the past with hypothyroidism and had been taking Synthroid.  She states that runs in her family.  She has had some fatigue    Relevant past medical, family, surgical, and social history reviewed with patient, unless noted in HPI, not pertinent for this visit.  Medications were discussed and reconciled.   Review of Systems   A 12 point comprehensive review of systems was negative except as noted.      Current Outpatient Medications   Medication Sig Dispense Refill    furosemide (LASIX) 20 MG tablet Take 1 tablet (20 mg) by mouth daily 30 tablet 1    spironolactone (ALDACTONE) 50 MG tablet Take 1 tablet (50 mg) by mouth daily 30 tablet 1         Objective:     /89   Pulse 119   Temp 97.9  F (36.6  C)   Resp 16   Ht 1.588 m (5' 2.5\")   Wt 101.4 kg (223 lb 9.6 oz)   LMP  (LMP Unknown)   SpO2 100%   BMI 40.25 kg/m      Body mass index is 40.25 kg/m .       General appearance: alert, appears stated age and cooperative  Lungs: clear to auscultation bilaterally  Heart: regular rate and rhythm, S1, S2 normal, no murmur, click, rub or gallop  Abdomen: enlarged, minimal tenderness to palpation, body habitus makes difficult to assess for ascites.      No results found for this or any previous visit (from the past 168 hour(s)).       This note has been dictated using voice recognition software. Any grammatical or context distortions are unintentional and inherent to the software  "

## 2023-08-09 ENCOUNTER — TELEPHONE (OUTPATIENT)
Dept: FAMILY MEDICINE | Facility: CLINIC | Age: 28
End: 2023-08-09

## 2023-08-09 LAB
ALBUMIN SERPL BCG-MCNC: 2.7 G/DL (ref 3.5–5.2)
ALP SERPL-CCNC: 133 U/L (ref 35–104)
ALT SERPL W P-5'-P-CCNC: 25 U/L (ref 0–50)
ANION GAP SERPL CALCULATED.3IONS-SCNC: 9 MMOL/L (ref 7–15)
AST SERPL W P-5'-P-CCNC: 103 U/L (ref 0–45)
BILIRUB SERPL-MCNC: 2.5 MG/DL
BUN SERPL-MCNC: 5.2 MG/DL (ref 6–20)
CALCIUM SERPL-MCNC: 8 MG/DL (ref 8.6–10)
CHLORIDE SERPL-SCNC: 105 MMOL/L (ref 98–107)
CREAT SERPL-MCNC: 0.52 MG/DL (ref 0.51–0.95)
DEPRECATED HCO3 PLAS-SCNC: 22 MMOL/L (ref 22–29)
GFR SERPL CREATININE-BSD FRML MDRD: >90 ML/MIN/1.73M2
GLUCOSE SERPL-MCNC: 94 MG/DL (ref 70–99)
POTASSIUM SERPL-SCNC: 3.8 MMOL/L (ref 3.4–5.3)
PROT SERPL-MCNC: 7.1 G/DL (ref 6.4–8.3)
SODIUM SERPL-SCNC: 136 MMOL/L (ref 136–145)
T4 FREE SERPL-MCNC: 1.29 NG/DL (ref 0.9–1.7)
THYROPEROXIDASE AB SERPL-ACNC: 53 IU/ML
TSH SERPL DL<=0.005 MIU/L-ACNC: 7.2 UIU/ML (ref 0.3–4.2)

## 2023-08-09 NOTE — TELEPHONE ENCOUNTER
Attempted to call patient. Left message for patient to return call to clinic. Please relay provider's message below when patient calls back.    KRISTY John, RN  [facility]

## 2023-08-09 NOTE — TELEPHONE ENCOUNTER
----- Message from Hannah Villalba MD sent at 8/9/2023 10:20 AM CDT -----  Let patient know that most of her liver tests are coming down and stabilizing.  Her thyroid lab results show that she has very mild Hashimoto's hypothyroidism.  We would have the option to start really low dose Synthroid if she would like or we can give it a little bit more time and discuss it when I see her at her physical.  Please let me know how she would like to proceed.

## 2023-08-09 NOTE — TELEPHONE ENCOUNTER
Pt returned call, results message relayed. Pt states that she would like to wait until her physical to further discuss any medication for thyroid

## 2023-08-30 NOTE — CONFIDENTIAL NOTE
DIAGNOSIS:    Acute alcoholic intoxication in alcoholism without complication (H)   Ascites due to alcoholic cirrhosis (H)   Acute alcoholic hepatitis      Appt Date: 11.07.2023    NOTES STATUS DETAILS   OFFICE NOTE from referring provider Internal 07.27.2023 Jennifer Corbett DO    OFFICE NOTES from other specialists Internal 08.08.2023 Hannah Villalba MD    DISCHARGE SUMMARY from hospital Internal 07.27.2023 Jennifer Corbett DO    MEDICATION LIST Internal    LIVER BIOSPY (IF APPLICABLE)      PATHOLOGY REPORTS      IMAGING     ENDOSCOPY (IF AVAILABLE)     COLONOSCOPY (IF AVAILABLE)     ULTRASOUND LIVER Internal 07.27.2023 US Abd Limited   CT OF ABDOMEN     MRI OF LIVER     FIBROSCAN, US ELASTOGRAPHY, FIBROSIS SCAN, MR ELASTOGRAPHY     LABS     HEPATIC PANEL (LIVER PANEL) Care Everywhere 08.24.2023   BASIC METABOLIC PANEL Care Everywhere 08.24.2023   COMPLETE METABOLIC PANEL Care Everywhere 08.24.2023   COMPLETE BLOOD COUNT (CBC) Care Everywhere 08.24.2023   INTERNATIONAL NORMALIZED RATIO (INR) Internal 08.04.2023   HEPATITIS C ANTIBODY Internal 07.27.2023   HEPATITIS C VIRAL LOAD/PCR     HEPATITIS C GENOTYPE     HEPATITIS B SURFACE ANTIGEN Internal 07.27.2023   HEPATITIS B SURFACE ANTIBODY     HEPATITIS B DNA QUANT LEVEL     HEPATITIS B CORE ANTIBODY Internal 07.27.2023

## 2023-09-17 ENCOUNTER — HEALTH MAINTENANCE LETTER (OUTPATIENT)
Age: 28
End: 2023-09-17

## 2023-09-22 LAB
ACID FAST STAIN (ARUP): NORMAL
ACID FAST STAIN (ARUP): NORMAL

## 2023-09-29 DIAGNOSIS — K70.11 ALCOHOLIC HEPATITIS WITH ASCITES (H): ICD-10-CM

## 2023-09-29 RX ORDER — FUROSEMIDE 20 MG
20 TABLET ORAL DAILY
Qty: 30 TABLET | Refills: 1 | Status: SHIPPED | OUTPATIENT
Start: 2023-09-29 | End: 2023-11-16

## 2023-09-29 NOTE — TELEPHONE ENCOUNTER
Patient is scheduled with gastro on November 7th. She needs a refill till she can get in to see them. Please advise for refill

## 2023-10-10 DIAGNOSIS — K70.11 ALCOHOLIC HEPATITIS WITH ASCITES (H): ICD-10-CM

## 2023-10-10 NOTE — TELEPHONE ENCOUNTER
Patient did get in for specialist for November 7th and just needs mela refill till she is seen with specialty.

## 2023-10-11 RX ORDER — SPIRONOLACTONE 50 MG/1
50 TABLET, FILM COATED ORAL DAILY
Qty: 30 TABLET | Refills: 1 | Status: SHIPPED | OUTPATIENT
Start: 2023-10-11 | End: 2023-11-16

## 2023-11-06 LAB
ABO/RH(D): NORMAL
ANTIBODY SCREEN: NEGATIVE
SPECIMEN EXPIRATION DATE: NORMAL

## 2023-11-07 ENCOUNTER — PRE VISIT (OUTPATIENT)
Dept: GASTROENTEROLOGY | Facility: CLINIC | Age: 28
End: 2023-11-07

## 2023-11-07 ENCOUNTER — PATIENT OUTREACH (OUTPATIENT)
Dept: GASTROENTEROLOGY | Facility: CLINIC | Age: 28
End: 2023-11-07

## 2023-11-07 ENCOUNTER — TELEPHONE (OUTPATIENT)
Dept: GASTROENTEROLOGY | Facility: CLINIC | Age: 28
End: 2023-11-07

## 2023-11-07 ENCOUNTER — LAB (OUTPATIENT)
Dept: LAB | Facility: CLINIC | Age: 28
End: 2023-11-07

## 2023-11-07 ENCOUNTER — OFFICE VISIT (OUTPATIENT)
Dept: GASTROENTEROLOGY | Facility: CLINIC | Age: 28
End: 2023-11-07
Attending: STUDENT IN AN ORGANIZED HEALTH CARE EDUCATION/TRAINING PROGRAM

## 2023-11-07 VITALS
SYSTOLIC BLOOD PRESSURE: 126 MMHG | DIASTOLIC BLOOD PRESSURE: 85 MMHG | HEART RATE: 109 BPM | WEIGHT: 205.9 LBS | BODY MASS INDEX: 36.48 KG/M2 | HEIGHT: 63 IN

## 2023-11-07 DIAGNOSIS — K70.31 ASCITES DUE TO ALCOHOLIC CIRRHOSIS (H): ICD-10-CM

## 2023-11-07 DIAGNOSIS — K70.31 ASCITES DUE TO ALCOHOLIC CIRRHOSIS (H): Primary | ICD-10-CM

## 2023-11-07 DIAGNOSIS — K70.10 ACUTE ALCOHOLIC HEPATITIS (H): Primary | ICD-10-CM

## 2023-11-07 DIAGNOSIS — F10.220 ACUTE ALCOHOLIC INTOXICATION IN ALCOHOLISM WITHOUT COMPLICATION (H): ICD-10-CM

## 2023-11-07 DIAGNOSIS — K70.11 ALCOHOLIC HEPATITIS WITH ASCITES (H): ICD-10-CM

## 2023-11-07 DIAGNOSIS — K70.10 ACUTE ALCOHOLIC HEPATITIS (H): ICD-10-CM

## 2023-11-07 LAB
AFP SERPL-MCNC: 2.3 NG/ML
ALBUMIN SERPL BCG-MCNC: 2.4 G/DL (ref 3.5–5.2)
ALP SERPL-CCNC: 177 U/L (ref 35–104)
ALT SERPL W P-5'-P-CCNC: 23 U/L (ref 0–50)
ANION GAP SERPL CALCULATED.3IONS-SCNC: 10 MMOL/L (ref 7–15)
AST SERPL W P-5'-P-CCNC: 106 U/L (ref 0–45)
BILIRUB SERPL-MCNC: 3.7 MG/DL
BUN SERPL-MCNC: 5 MG/DL (ref 6–20)
CALCIUM SERPL-MCNC: 7.7 MG/DL (ref 8.6–10)
CHLORIDE SERPL-SCNC: 101 MMOL/L (ref 98–107)
CREAT SERPL-MCNC: 0.85 MG/DL (ref 0.51–0.95)
DEPRECATED HCO3 PLAS-SCNC: 22 MMOL/L (ref 22–29)
EGFRCR SERPLBLD CKD-EPI 2021: >90 ML/MIN/1.73M2
ERYTHROCYTE [DISTWIDTH] IN BLOOD BY AUTOMATED COUNT: 25.7 % (ref 10–15)
GLUCOSE SERPL-MCNC: 95 MG/DL (ref 70–99)
HCT VFR BLD AUTO: 20.1 % (ref 35–47)
HGB BLD-MCNC: 6.3 G/DL (ref 11.7–15.7)
INR PPP: 2.42 (ref 0.85–1.15)
MCH RBC QN AUTO: 26.8 PG (ref 26.5–33)
MCHC RBC AUTO-ENTMCNC: 31.3 G/DL (ref 31.5–36.5)
MCV RBC AUTO: 86 FL (ref 78–100)
PLATELET # BLD AUTO: 154 10E3/UL (ref 150–450)
POTASSIUM SERPL-SCNC: 3.2 MMOL/L (ref 3.4–5.3)
PROT SERPL-MCNC: 7.9 G/DL (ref 6.4–8.3)
RBC # BLD AUTO: 2.35 10E6/UL (ref 3.8–5.2)
SODIUM SERPL-SCNC: 133 MMOL/L (ref 135–145)
WBC # BLD AUTO: 11.5 10E3/UL (ref 4–11)

## 2023-11-07 PROCEDURE — 80053 COMPREHEN METABOLIC PANEL: CPT | Performed by: PATHOLOGY

## 2023-11-07 PROCEDURE — 82105 ALPHA-FETOPROTEIN SERUM: CPT | Performed by: STUDENT IN AN ORGANIZED HEALTH CARE EDUCATION/TRAINING PROGRAM

## 2023-11-07 PROCEDURE — 99000 SPECIMEN HANDLING OFFICE-LAB: CPT | Performed by: PATHOLOGY

## 2023-11-07 PROCEDURE — 99213 OFFICE O/P EST LOW 20 MIN: CPT | Performed by: STUDENT IN AN ORGANIZED HEALTH CARE EDUCATION/TRAINING PROGRAM

## 2023-11-07 PROCEDURE — 85610 PROTHROMBIN TIME: CPT | Performed by: PATHOLOGY

## 2023-11-07 PROCEDURE — 86901 BLOOD TYPING SEROLOGIC RH(D): CPT | Performed by: STUDENT IN AN ORGANIZED HEALTH CARE EDUCATION/TRAINING PROGRAM

## 2023-11-07 PROCEDURE — 36415 COLL VENOUS BLD VENIPUNCTURE: CPT | Performed by: PATHOLOGY

## 2023-11-07 PROCEDURE — 86850 RBC ANTIBODY SCREEN: CPT | Performed by: STUDENT IN AN ORGANIZED HEALTH CARE EDUCATION/TRAINING PROGRAM

## 2023-11-07 PROCEDURE — 99205 OFFICE O/P NEW HI 60 MIN: CPT | Performed by: STUDENT IN AN ORGANIZED HEALTH CARE EDUCATION/TRAINING PROGRAM

## 2023-11-07 PROCEDURE — 85027 COMPLETE CBC AUTOMATED: CPT | Performed by: PATHOLOGY

## 2023-11-07 RX ORDER — DIPHENHYDRAMINE HYDROCHLORIDE 50 MG/ML
50 INJECTION INTRAMUSCULAR; INTRAVENOUS
Status: CANCELLED
Start: 2023-11-07

## 2023-11-07 RX ORDER — HEPARIN SODIUM (PORCINE) LOCK FLUSH IV SOLN 100 UNIT/ML 100 UNIT/ML
5 SOLUTION INTRAVENOUS
Status: CANCELLED | OUTPATIENT
Start: 2023-11-07

## 2023-11-07 RX ORDER — EPINEPHRINE 1 MG/ML
0.3 INJECTION, SOLUTION, CONCENTRATE INTRAVENOUS EVERY 5 MIN PRN
Status: CANCELLED | OUTPATIENT
Start: 2023-11-07

## 2023-11-07 RX ORDER — METHYLPREDNISOLONE SODIUM SUCCINATE 125 MG/2ML
125 INJECTION, POWDER, LYOPHILIZED, FOR SOLUTION INTRAMUSCULAR; INTRAVENOUS
Status: CANCELLED
Start: 2023-11-07

## 2023-11-07 RX ORDER — HEPARIN SODIUM,PORCINE 10 UNIT/ML
5-20 VIAL (ML) INTRAVENOUS DAILY PRN
Status: CANCELLED | OUTPATIENT
Start: 2023-11-07

## 2023-11-07 RX ORDER — LIDOCAINE HYDROCHLORIDE 10 MG/ML
20 INJECTION, SOLUTION EPIDURAL; INFILTRATION; INTRACAUDAL; PERINEURAL ONCE
Status: CANCELLED | OUTPATIENT
Start: 2023-11-07 | End: 2023-11-07

## 2023-11-07 RX ORDER — ALBUTEROL SULFATE 0.83 MG/ML
2.5 SOLUTION RESPIRATORY (INHALATION)
Status: CANCELLED | OUTPATIENT
Start: 2023-11-07

## 2023-11-07 RX ORDER — ALBUMIN (HUMAN) 12.5 G/50ML
12.5 SOLUTION INTRAVENOUS
Status: CANCELLED | OUTPATIENT
Start: 2023-11-07

## 2023-11-07 RX ORDER — ALBUTEROL SULFATE 90 UG/1
1-2 AEROSOL, METERED RESPIRATORY (INHALATION)
Status: CANCELLED
Start: 2023-11-07

## 2023-11-07 RX ORDER — MEPERIDINE HYDROCHLORIDE 25 MG/ML
25 INJECTION INTRAMUSCULAR; INTRAVENOUS; SUBCUTANEOUS EVERY 30 MIN PRN
Status: CANCELLED | OUTPATIENT
Start: 2023-11-07

## 2023-11-07 ASSESSMENT — PAIN SCALES - GENERAL: PAINLEVEL: EXTREME PAIN (8)

## 2023-11-07 NOTE — TELEPHONE ENCOUNTER
DATE/TIME OF CALL RECEIVED FROM LAB:  11/07/23 at 10:09 AM   LAB TEST:  Hemoglobin  LAB VALUE:  6.3  PROVIDER NOTIFIED?: Yes  PROVIDER NAME: Luci Mendez MD  DATE/TIME LAB VALUE REPORTED TO PROVIDER:  AMANDA   MECHANISM OF PROVIDER NOTIFICATION:  AMANDA PETERSEN LPN

## 2023-11-07 NOTE — LETTER
11/7/2023         RE: Priya Castano  [address]  [place] [address]        Dear Colleague,    Thank you for referring your patient, Priya Castano, to the [facility] [place]. Please see a copy of my visit note below.    [facility] New Patient Visit    Date of Visit: November 7, 2023    Reason for referral: alcohol related liver disease    Subjective: Ms. Castano is a 28 year old woman with a history of alcohol-related liver disease who presents to [facility] care.     She was first told she had liver disease in July last year when she presented for detox.  Had imaging that showed, megaly with steatosis and nodular liver.  Had ascites, underwent a paracentesis that was consistent with portal hypertension admitted for SBP.  Given diuretics that she has been taking intermittently she is having hard time finding a provider to prescribe this.  After detox she [facility] to [facility], has since graduated to twice weekly outpatient treatment. Has been sober since 7/27 - had been sober for a month or so in the past. Would drink a bottle of wine a day. She is also seeing a therapist now.     PCP gave her diuretics - lasix 20/jayden 50 recently but she feels she needs a paracentesis    Denies overt blood loss, or confusion    ROS: 14 point ROS negative except for positives noted in HPI.    PMHx:  Past Medical History:   Diagnosis Date    Hypothyroidism        PSHx:  Past Surgical History:   Procedure Laterality Date    IR PARACENTESIS  7/28/2023       FamHx:  Family History   Problem Relation Age of Onset    Thyroid Disease Mother    Uncle with ?liver disease  Heavy alcohol use    SocHx:  Social History     Socioeconomic History    Marital status: Single     Spouse name: Not on file    Number of children: Not on file    Years of education: Not on file    Highest education level: Not on file   Occupational History    Not on file  "  Tobacco Use    Smoking status: Never    Smokeless tobacco: Never   Substance and Sexual Activity    Alcohol use: Yes     Comment: 1 bottle wine daily    Drug use: No    Sexual activity: Never   Other Topics Concern    Parent/sibling w/ CABG, MI or angioplasty before 65F 55M? No   Social History Narrative    ** Merged History Encounter **         FAMILY INFORMATION     Date: 2006    Parent #1      Name: Pema Castano   Gender: female   : 1977      Education: Not listed   Occupation:         Parent #2      Name: Not listed   Gender: male   : Not listed     Education: Not listed   Occupation: Not listed        Siblings: not listed        Relationship Status of Parent(s):     Who does the child live with? mother and father    What language(s) is/are spoken at home? [place]             Social Determinants of Health     Financial Resource Strain: Not on file   Food Insecurity: Not on file   Transportation Needs: Not on file   Physical Activity: Not on file   Stress: Not on file   Social Connections: Not on file   Interpersonal Safety: Not on file   Housing Stability: Not on file       Medications:  Current Outpatient Medications   Medication    furosemide (LASIX) 20 MG tablet    spironolactone (ALDACTONE) 50 MG tablet     No current facility-administered medications for this visit.     No OTCs, herbals    Allergies:  No Known Allergies    Objective:  /85   Pulse 109   Ht 1.588 m (5' 2.5\")   Wt 93.4 kg (205 lb 14.4 oz)   BMI 37.06 kg/m    Constitutional: pleasant woman in NAD  Eyes: non icteric  Respiratory: Normal respiratory excursion   MSK: normal range of motion of visualized extremities  Abd: distended with ascites  Skin: No jaundice  Psychiatric: normal mood and orientation    Labs:  Last Comprehensive Metabolic Panel:  Sodium   Date Value Ref Range Status   2023 133 (L) 135 - 145 mmol/L Final     Comment:     Reference intervals for this test were updated on " 09/26/2023 to more accurately reflect our healthy population. There may be differences in the flagging of prior results with similar values performed with this method. Interpretation of those prior results can be made in the context of the updated reference intervals.      Potassium   Date Value Ref Range Status   11/07/2023 3.2 (L) 3.4 - 5.3 mmol/L Final     Chloride   Date Value Ref Range Status   11/07/2023 101 98 - 107 mmol/L Final     Carbon Dioxide (CO2)   Date Value Ref Range Status   11/07/2023 22 22 - 29 mmol/L Final     Anion Gap   Date Value Ref Range Status   11/07/2023 10 7 - 15 mmol/L Final     Glucose   Date Value Ref Range Status   11/07/2023 95 70 - 99 mg/dL Final     Urea Nitrogen   Date Value Ref Range Status   11/07/2023 5.0 (L) 6.0 - 20.0 mg/dL Final     Creatinine   Date Value Ref Range Status   11/07/2023 0.85 0.51 - 0.95 mg/dL Final     GFR Estimate   Date Value Ref Range Status   11/07/2023 >90 >60 mL/min/1.73m2 Final     Calcium   Date Value Ref Range Status   11/07/2023 7.7 (L) 8.6 - 10.0 mg/dL Final     Bilirubin Total   Date Value Ref Range Status   11/07/2023 3.7 (H) <=1.2 mg/dL Final     Alkaline Phosphatase   Date Value Ref Range Status   11/07/2023 177 (H) 35 - 104 U/L Final     ALT   Date Value Ref Range Status   11/07/2023 23 0 - 50 U/L Final     Comment:     Reference intervals for this test were updated on 6/12/2023 to more accurately reflect our healthy population. There may be differences in the flagging of prior results with similar values performed with this method. Interpretation of those prior results can be made in the context of the updated reference intervals.       AST   Date Value Ref Range Status   11/07/2023 106 (H) 0 - 45 U/L Final     Comment:     Reference intervals for this test were updated on 6/12/2023 to more accurately reflect our healthy population. There may be differences in the flagging of prior results with similar values performed with this method.  "Interpretation of those prior results can be made in the context of the updated reference intervals.       Lab Results   Component Value Date    WBC 16.4 08/08/2023     Lab Results   Component Value Date    RBC 2.70 08/08/2023     Lab Results   Component Value Date    HGB 8.9 08/08/2023    HGB 13.9 12/09/2013     Lab Results   Component Value Date    HCT 27.3 08/08/2023     Lab Results   Component Value Date     08/08/2023     Lab Results   Component Value Date    MCH 33.0 08/08/2023     Lab Results   Component Value Date    MCHC 32.6 08/08/2023     Lab Results   Component Value Date    RDW 17.9 08/08/2023     Lab Results   Component Value Date     08/08/2023       INR   Date Value Ref Range Status   11/07/2023 2.42 (H) 0.85 - 1.15 Final        MELD 3.0: 25 at 11/7/2023  9:53 AM  MELD-Na: 24 at 11/7/2023  9:53 AM  Calculated from:  Serum Creatinine: 0.85 mg/dL (Using min of 1 mg/dL) at 11/7/2023  9:53 AM  Serum Sodium: 133 mmol/L at 11/7/2023  9:53 AM  Total Bilirubin: 3.7 mg/dL at 11/7/2023  9:53 AM  Serum Albumin: 2.4 g/dL at 11/7/2023  9:53 AM  INR(ratio): 2.42 at 11/7/2023  9:53 AM  Age at listing (hypothetical): 28 years  Sex: Female at 11/7/2023  9:53 AM    Previous work-up:   Lab Results   Component Value Date    HEPBANG Nonreactive 07/27/2023    HCVAB Nonreactive 07/27/2023    TSH 7.20 (H) 08/08/2023      No results found for: \"SPECDES\", \"LDRESULTS\"    Imaging:     RUQ US 7/27/2023    FINDINGS:     GALLBLADDER: The gallbladder is not pathologically distended. There is  significant wall edema without gallstones or pericholecystic fluid.  Negative sonographic Morrison's sign.     BILE DUCTS: There is no intrahepatic biliary dilatation. The common  duct measures 3 mm.     LIVER: Enlarged, measuring up to 23 cm, with diffusely increased  echogenicity. Nodular contour noted.     RIGHT KIDNEY: No hydronephrosis.     PANCREAS: The visualized portions of the pancreas are normal.     Small volume ascites " throughout the abdomen and pelvis.                                                                      IMPRESSION:  1.  Morphologic changes suspicious for chronic liver  disease/cirrhosis. Small volume ascites.  2.  Hepatomegaly with severe hepatic steatosis and gallbladder edema,  could be seen with superimposed acute hepatitis.  3.  No evidence of biliary obstruction.    Endoscopy: None    Independently reviewed labs and imaging.     Assessment/Plan:  Ms. Castano is a 28 year old woman with a history of AUD, ARLD, who presents to [facility] care. She has decompensated cirrhosis as evidenced by ascites. Required one paracentesis in July, now has recurrent ascites, will get her set up for a repeat. If she has re accumulation of ascites after paracentesis will increate her diuretics. She was noted to have subacute anemia on labs, denies overt blood loss. Will arrange for outpatient blood transfusion, discussed that if she is symptomatic she should come to the ED. Discussed need for EGD in the future. Discussed the need for sobriety from alcohol lifelong. Discussed need for HCC screening. Discussed patients with ARLD who are young may have some improvement in her fibrosis. She is not sexually active currently (lives with fiance). Discussed pregnancy would be high risk given her liver disease, would recommend barrier method if active. If her liver disease recompensates with sobriety, discussed progesterone based options are safe in compensated cirrhosis.     Orders Placed This Encounter   Procedures    US Abdomen Limited    CBC with platelets    Comprehensive metabolic panel    INR    AFP tumor marker    Adult GI  Referral - Procedure Only       RTC 4 months.    Luci Mendez MD MS  Hepatology/Liver Transplant  [facility]        Again, thank you for allowing me to participate in the care of your patient.        Sincerely,        Luci Mendez MD

## 2023-11-07 NOTE — PROGRESS NOTES
[facility] New Patient Visit    Date of Visit: November 7, 2023    Reason for referral: alcohol related liver disease    Subjective: Ms. Castano is a 28 year old woman with a history of alcohol-related liver disease who presents to hospitals care.     She was first told she had liver disease in July last year when she presented for detox.  Had imaging that showed, megaly with steatosis and nodular liver.  Had ascites, underwent a paracentesis that was consistent with portal hypertension admitted for SBP.  Given diuretics that she has been taking intermittently she is having hard time finding a provider to prescribe this.  After detox [place] went to the [facility] inpatient [place] [facility], has since graduated to twice weekly outpatient treatment. Has been sober since 7/27 - had been sober for a month or so in the past. Would drink a bottle of wine a day. She is also seeing a therapist now.     PCP gave her diuretics - lasix 20/jayden 50 recently but she feels she needs a paracentesis    Denies overt blood loss, or confusion    ROS: 14 point ROS negative except for positives noted in HPI.    PMHx:  Past Medical History:   Diagnosis Date    Hypothyroidism        PSHx:  Past Surgical History:   Procedure Laterality Date    IR PARACENTESIS  7/28/2023       FamHx:  Family History   Problem Relation Age of Onset    Thyroid Disease Mother    Uncle with ?liver disease  Heavy alcohol use    SocHx:  Social History     Socioeconomic History    Marital status: Single     Spouse name: Not on file    Number of children: Not on file    Years of education: Not on file    Highest education level: Not on file   Occupational History    Not on file   Tobacco Use    Smoking status: Never    Smokeless tobacco: Never   Substance and Sexual Activity    Alcohol use: Yes     Comment: 1 bottle wine daily    Drug use: No    Sexual activity: Never   Other Topics Concern    Parent/sibling w/ CABG, MI or angioplasty before  "65F 55M? No   Social History Narrative    ** Merged History Encounter **         FAMILY INFORMATION     Date: 2006    Parent #1      Name: Pema Castano   Gender: female   : 1977      Education: Not listed   Occupation:         Parent #2      Name: Not listed   Gender: male   : Not listed     Education: Not listed   Occupation: Not listed        Siblings: not listed        Relationship Status of Parent(s):     Who does the child live with? mother and father    What language(s) is/are spoken at home? Romanian             Social Determinants of Health     Financial Resource Strain: Not on file   Food Insecurity: Not on file   Transportation Needs: Not on file   Physical Activity: Not on file   Stress: Not on file   Social Connections: Not on file   Interpersonal Safety: Not on file   Housing Stability: Not on file       Medications:  Current Outpatient Medications   Medication    furosemide (LASIX) 20 MG tablet    spironolactone (ALDACTONE) 50 MG tablet     No current facility-administered medications for this visit.     No OTCs, herbals    Allergies:  No Known Allergies    Objective:  /85   Pulse 109   Ht 1.588 m (5' 2.5\")   Wt 93.4 kg (205 lb 14.4 oz)   BMI 37.06 kg/m    Constitutional: pleasant woman in NAD  Eyes: non icteric  Respiratory: Normal respiratory excursion   MSK: normal range of motion of visualized extremities  Abd: distended with ascites  Skin: No jaundice  Psychiatric: normal mood and orientation    Labs:  Last Comprehensive Metabolic Panel:  Sodium   Date Value Ref Range Status   2023 133 (L) 135 - 145 mmol/L Final     Comment:     Reference intervals for this test were updated on 2023 to more accurately reflect our healthy population. There may be differences in the flagging of prior results with similar values performed with this method. Interpretation of those prior results can be made in the context of the updated reference intervals.  "     Potassium   Date Value Ref Range Status   11/07/2023 3.2 (L) 3.4 - 5.3 mmol/L Final     Chloride   Date Value Ref Range Status   11/07/2023 101 98 - 107 mmol/L Final     Carbon Dioxide (CO2)   Date Value Ref Range Status   11/07/2023 22 22 - 29 mmol/L Final     Anion Gap   Date Value Ref Range Status   11/07/2023 10 7 - 15 mmol/L Final     Glucose   Date Value Ref Range Status   11/07/2023 95 70 - 99 mg/dL Final     Urea Nitrogen   Date Value Ref Range Status   11/07/2023 5.0 (L) 6.0 - 20.0 mg/dL Final     Creatinine   Date Value Ref Range Status   11/07/2023 0.85 0.51 - 0.95 mg/dL Final     GFR Estimate   Date Value Ref Range Status   11/07/2023 >90 >60 mL/min/1.73m2 Final     Calcium   Date Value Ref Range Status   11/07/2023 7.7 (L) 8.6 - 10.0 mg/dL Final     Bilirubin Total   Date Value Ref Range Status   11/07/2023 3.7 (H) <=1.2 mg/dL Final     Alkaline Phosphatase   Date Value Ref Range Status   11/07/2023 177 (H) 35 - 104 U/L Final     ALT   Date Value Ref Range Status   11/07/2023 23 0 - 50 U/L Final     Comment:     Reference intervals for this test were updated on 6/12/2023 to more accurately reflect our healthy population. There may be differences in the flagging of prior results with similar values performed with this method. Interpretation of those prior results can be made in the context of the updated reference intervals.       AST   Date Value Ref Range Status   11/07/2023 106 (H) 0 - 45 U/L Final     Comment:     Reference intervals for this test were updated on 6/12/2023 to more accurately reflect our healthy population. There may be differences in the flagging of prior results with similar values performed with this method. Interpretation of those prior results can be made in the context of the updated reference intervals.       Lab Results   Component Value Date    WBC 16.4 08/08/2023     Lab Results   Component Value Date    RBC 2.70 08/08/2023     Lab Results   Component Value Date    HGB  "8.9 08/08/2023    HGB 13.9 12/09/2013     Lab Results   Component Value Date    HCT 27.3 08/08/2023     Lab Results   Component Value Date     08/08/2023     Lab Results   Component Value Date    MCH 33.0 08/08/2023     Lab Results   Component Value Date    MCHC 32.6 08/08/2023     Lab Results   Component Value Date    RDW 17.9 08/08/2023     Lab Results   Component Value Date     08/08/2023       INR   Date Value Ref Range Status   11/07/2023 2.42 (H) 0.85 - 1.15 Final        MELD 3.0: 25 at 11/7/2023  9:53 AM  MELD-Na: 24 at 11/7/2023  9:53 AM  Calculated from:  Serum Creatinine: 0.85 mg/dL (Using min of 1 mg/dL) at 11/7/2023  9:53 AM  Serum Sodium: 133 mmol/L at 11/7/2023  9:53 AM  Total Bilirubin: 3.7 mg/dL at 11/7/2023  9:53 AM  Serum Albumin: 2.4 g/dL at 11/7/2023  9:53 AM  INR(ratio): 2.42 at 11/7/2023  9:53 AM  Age at listing (hypothetical): 28 years  Sex: Female at 11/7/2023  9:53 AM    Previous work-up:   Lab Results   Component Value Date    HEPBANG Nonreactive 07/27/2023    HCVAB Nonreactive 07/27/2023    TSH 7.20 (H) 08/08/2023      No results found for: \"SPECDES\", \"LDRESULTS\"    Imaging:     RU US 7/27/2023    FINDINGS:     GALLBLADDER: The gallbladder is not pathologically distended. There is  significant wall edema without gallstones or pericholecystic fluid.  Negative sonographic Morrison's sign.     BILE DUCTS: There is no intrahepatic biliary dilatation. The common  duct measures 3 mm.     LIVER: Enlarged, measuring up to 23 cm, with diffusely increased  echogenicity. Nodular contour noted.     RIGHT KIDNEY: No hydronephrosis.     PANCREAS: The visualized portions of the pancreas are normal.     Small volume ascites throughout the abdomen and pelvis.                                                                      IMPRESSION:  1.  Morphologic changes suspicious for chronic liver  disease/cirrhosis. Small volume ascites.  2.  Hepatomegaly with severe hepatic steatosis and " gallbladder edema,  could be seen with superimposed acute hepatitis.  3.  No evidence of biliary obstruction.    Endoscopy: None    Independently reviewed labs and imaging.     Assessment/Plan:  Ms. Castano is a 28 year old woman with a history of AUD, ARLD, who presents to establish care. She has decompensated cirrhosis as evidenced by ascites. Required one paracentesis in July, now has recurrent ascites, will get her set up for a repeat. If she has re accumulation of ascites after paracentesis will increate her diuretics. She was noted to have subacute anemia on labs, denies overt blood loss. Will arrange for outpatient blood transfusion, discussed that if she is symptomatic she should come to the ED. Discussed need for EGD in the future. Discussed the need for sobriety from alcohol lifelong. Discussed need for HCC screening. Discussed patients with ARLD who are young may have some improvement in her fibrosis. She is not sexually active currently (lives with fiance). Discussed pregnancy would be high risk given her liver disease, would recommend barrier method if active. If her liver disease recompensates with sobriety, discussed progesterone based options are safe in compensated cirrhosis.     Orders Placed This Encounter   Procedures    US Abdomen Limited    CBC with platelets    Comprehensive metabolic panel    INR    AFP tumor marker    Adult GI  Referral - Procedure Only       RTC 4 months.    Luci Mendez MD MS  Hepatology/Liver Transplant  [facility]

## 2023-11-07 NOTE — NURSING NOTE
"Chief Complaint   Patient presents with    Consult     Establish care with cirrhosis     /85   Pulse 109   Ht 1.588 m (5' 2.5\")   Wt 93.4 kg (205 lb 14.4 oz)   BMI 37.06 kg/m    Marifer Sexton, Lead CMA  11/7/2023 8:57 AM    "

## 2023-11-07 NOTE — PATIENT INSTRUCTIONS
It was a pleasure taking care of you today.  I've included a brief summary of our discussion and care plan from today's visit below.  Please review this information with your primary care provider.  ______________________________________________________________________     My recommendations are summarized as follows:     - Continue water pills at current dose  - We will call you to get set up for a paracentesis     Return to Hepatology (Liver) Clinic in 4 months to review your progress.    ______________________________________________________________________     How do I schedule labs, imaging studies, or procedures that were ordered in clinic today?      Labs: To schedule lab appointment at the [facility] and Surgery Center, use my chart or call [phone]. If you have a [facility] lab closer to home where you are regularly seen you can give them a call.     Procedures: If a colonoscopy or upper endoscopy was ordered today, our endoscopy team will call you to schedule this. If you have not heard from our endoscopy team within a week, please call [phone] to schedule.      Imaging Studies: If you were scheduled for a CT scan, X-ray, MRI, ultrasound, or other imaging study, please call [phone] to have this scheduled.      Referral: If a referral to another specialty was ordered, expect a phone call or follow instructions above. If you have not heard from anyone regarding your referral in a week, please call our clinic to check the status.      Who do I call with any questions after my visit?  Please be in touch if there are any further questions that arise following today's visit.  There are multiple ways to contact your hepatology (liver) care team.       During business hours, you may reach a Hepatology nurse at [phone]    To schedule or reschedule an appointment, please call [phone]     You can always send a secure message through [employer].  [employer] messages are answered by your nurse or doctor  typically within 24 hours.  Please allow extra time on weekends and holidays.       How will I get the results of any tests ordered?    You will receive all of your results.  If you have signed up for [email or web address], any tests ordered at your visit will be available to you after your provider reviews them.  If you are not signed up for [email or web address], you will receive a letter with the results. Typically this takes 1-2 weeks.  If there are urgent results that require a change in your care plan, your provider or nurse will call you to discuss the next steps.       What is Anonymous You?  Anonymous You is a secure way for you to access all of your healthcare records from the [facility].  It is a web based computer program, so you can sign on to it from any location.  It also allows you to send secure messages to your care team.  I recommend signing up for Anonymous You access if you have not already done so and are comfortable with using a computer.       How to I schedule a follow-up visit?  If you did not schedule a follow-up visit today, please call [phone] to schedule a follow-up office visit.       Sincerely,     Luci Mendez MD  Hepatology  Division of Gastroenterology, Hepatology & Nutrition  [facility]

## 2023-11-07 NOTE — TELEPHONE ENCOUNTER
Verbal consent received for packed red blood cells. One-time blood product therapy plan entered. Consent form completed with Dr. Luci Mendez, Disha Mai LPN and writer.    Routed to [facility] for scheduling. Appointment preferred 11/7/23 or 11/8/23.    JENNIFER RestrepoN, RN, PHN  Hepatology Clinic  [facility]  [facility]

## 2023-11-07 NOTE — TELEPHONE ENCOUNTER
Patient scheduled for blood transfusion tomorrow.    JENNIFER RestrepoN, RN, PHN  Hepatology Clinic  [facility]  [facility]

## 2023-11-08 ENCOUNTER — INFUSION THERAPY VISIT (OUTPATIENT)
Dept: INFUSION THERAPY | Facility: CLINIC | Age: 28
End: 2023-11-08
Attending: STUDENT IN AN ORGANIZED HEALTH CARE EDUCATION/TRAINING PROGRAM

## 2023-11-08 VITALS
OXYGEN SATURATION: 98 % | SYSTOLIC BLOOD PRESSURE: 110 MMHG | HEART RATE: 108 BPM | RESPIRATION RATE: 20 BRPM | DIASTOLIC BLOOD PRESSURE: 77 MMHG | TEMPERATURE: 98.8 F

## 2023-11-08 DIAGNOSIS — K70.31 ASCITES DUE TO ALCOHOLIC CIRRHOSIS (H): ICD-10-CM

## 2023-11-08 DIAGNOSIS — K70.10 ACUTE ALCOHOLIC HEPATITIS (H): Primary | ICD-10-CM

## 2023-11-08 LAB
BLD PROD TYP BPU: NORMAL
BLOOD COMPONENT TYPE: NORMAL
CODING SYSTEM: NORMAL
CROSSMATCH: NORMAL
ISSUE DATE AND TIME: NORMAL
UNIT ABO/RH: NORMAL
UNIT NUMBER: NORMAL
UNIT STATUS: NORMAL
UNIT TYPE ISBT: 5100

## 2023-11-08 PROCEDURE — P9016 RBC LEUKOCYTES REDUCED: HCPCS | Performed by: STUDENT IN AN ORGANIZED HEALTH CARE EDUCATION/TRAINING PROGRAM

## 2023-11-08 PROCEDURE — 36430 TRANSFUSION BLD/BLD COMPNT: CPT

## 2023-11-08 PROCEDURE — 86923 COMPATIBILITY TEST ELECTRIC: CPT | Performed by: STUDENT IN AN ORGANIZED HEALTH CARE EDUCATION/TRAINING PROGRAM

## 2023-11-08 RX ORDER — HEPARIN SODIUM (PORCINE) LOCK FLUSH IV SOLN 100 UNIT/ML 100 UNIT/ML
5 SOLUTION INTRAVENOUS
OUTPATIENT
Start: 2023-11-08

## 2023-11-08 RX ORDER — HEPARIN SODIUM,PORCINE 10 UNIT/ML
5-20 VIAL (ML) INTRAVENOUS DAILY PRN
OUTPATIENT
Start: 2023-11-08

## 2023-11-08 NOTE — PROGRESS NOTES
Infusion Nursing Note:  Priya Castano presents today for 1 unit of blood.    Patient seen by provider today: No   present during visit today: Not Applicable.    Note: Pt abdomen extremely swollen. Pain 6/10 pt said she will need fluid removed soon. Pt saw [facility] doctors yesterday and 1 unit of blood ordered for hgb of 6.3. Pt also has edema 1+ in lower extremities.      Intravenous Access:  Peripheral IV placed.    Treatment Conditions:  Lab Results   Component Value Date    HGB 6.3 (LL) 11/07/2023    WBC 11.5 (H) 11/07/2023    ANEUTAUTO 16.3 (H) 07/28/2023     11/07/2023        Lab Results   Component Value Date     (L) 11/07/2023    POTASSIUM 3.2 (L) 11/07/2023    MAG 1.8 07/30/2023    CR 0.85 11/07/2023    JULIAN 7.7 (L) 11/07/2023    BILITOTAL 3.7 (H) 11/07/2023    ALBUMIN 2.4 (L) 11/07/2023    ALT 23 11/07/2023     (H) 11/07/2023       Results reviewed, labs MET treatment parameters, ok to proceed with treatment.      Post Infusion Assessment:  Patient tolerated infusion without incident.       Discharge Plan:   Patient and/or family verbalized understanding of discharge instructions and all questions answered.      Elaina Ricci RN

## 2023-11-10 ENCOUNTER — ANCILLARY PROCEDURE (OUTPATIENT)
Dept: ULTRASOUND IMAGING | Facility: CLINIC | Age: 28
End: 2023-11-10
Attending: STUDENT IN AN ORGANIZED HEALTH CARE EDUCATION/TRAINING PROGRAM

## 2023-11-10 ENCOUNTER — OFFICE VISIT (OUTPATIENT)
Dept: INFUSION THERAPY | Facility: CLINIC | Age: 28
End: 2023-11-10
Attending: STUDENT IN AN ORGANIZED HEALTH CARE EDUCATION/TRAINING PROGRAM

## 2023-11-10 VITALS
WEIGHT: 196 LBS | HEART RATE: 103 BPM | RESPIRATION RATE: 18 BRPM | TEMPERATURE: 97.9 F | SYSTOLIC BLOOD PRESSURE: 119 MMHG | OXYGEN SATURATION: 98 % | BODY MASS INDEX: 35.28 KG/M2 | DIASTOLIC BLOOD PRESSURE: 78 MMHG

## 2023-11-10 DIAGNOSIS — K70.31 ASCITES DUE TO ALCOHOLIC CIRRHOSIS (H): Primary | ICD-10-CM

## 2023-11-10 PROCEDURE — P9047 ALBUMIN (HUMAN), 25%, 50ML: HCPCS | Performed by: STUDENT IN AN ORGANIZED HEALTH CARE EDUCATION/TRAINING PROGRAM

## 2023-11-10 PROCEDURE — 49083 ABD PARACENTESIS W/IMAGING: CPT | Performed by: RADIOLOGY

## 2023-11-10 PROCEDURE — 250N000009 HC RX 250: Performed by: STUDENT IN AN ORGANIZED HEALTH CARE EDUCATION/TRAINING PROGRAM

## 2023-11-10 PROCEDURE — 49083 ABD PARACENTESIS W/IMAGING: CPT

## 2023-11-10 PROCEDURE — 250N000011 HC RX IP 250 OP 636: Performed by: STUDENT IN AN ORGANIZED HEALTH CARE EDUCATION/TRAINING PROGRAM

## 2023-11-10 RX ORDER — EPINEPHRINE 1 MG/ML
0.3 INJECTION, SOLUTION INTRAMUSCULAR; SUBCUTANEOUS EVERY 5 MIN PRN
OUTPATIENT
Start: 2023-11-10

## 2023-11-10 RX ORDER — ALBUMIN (HUMAN) 12.5 G/50ML
12.5 SOLUTION INTRAVENOUS
Status: COMPLETED | OUTPATIENT
Start: 2023-11-10 | End: 2023-11-10

## 2023-11-10 RX ORDER — HEPARIN SODIUM,PORCINE 10 UNIT/ML
5-20 VIAL (ML) INTRAVENOUS DAILY PRN
OUTPATIENT
Start: 2023-11-10

## 2023-11-10 RX ORDER — METHYLPREDNISOLONE SODIUM SUCCINATE 125 MG/2ML
125 INJECTION, POWDER, LYOPHILIZED, FOR SOLUTION INTRAMUSCULAR; INTRAVENOUS
Start: 2023-11-10

## 2023-11-10 RX ORDER — HEPARIN SODIUM (PORCINE) LOCK FLUSH IV SOLN 100 UNIT/ML 100 UNIT/ML
5 SOLUTION INTRAVENOUS
OUTPATIENT
Start: 2023-11-10

## 2023-11-10 RX ORDER — ALBUMIN (HUMAN) 12.5 G/50ML
12.5 SOLUTION INTRAVENOUS
Status: CANCELLED | OUTPATIENT
Start: 2023-11-10

## 2023-11-10 RX ORDER — ALBUTEROL SULFATE 90 UG/1
1-2 AEROSOL, METERED RESPIRATORY (INHALATION)
Start: 2023-11-10

## 2023-11-10 RX ORDER — ALBUTEROL SULFATE 0.83 MG/ML
2.5 SOLUTION RESPIRATORY (INHALATION)
OUTPATIENT
Start: 2023-11-10

## 2023-11-10 RX ORDER — LIDOCAINE HYDROCHLORIDE 10 MG/ML
20 INJECTION, SOLUTION EPIDURAL; INFILTRATION; INTRACAUDAL; PERINEURAL ONCE
Status: CANCELLED | OUTPATIENT
Start: 2023-11-10 | End: 2023-11-10

## 2023-11-10 RX ORDER — MEPERIDINE HYDROCHLORIDE 25 MG/ML
25 INJECTION INTRAMUSCULAR; INTRAVENOUS; SUBCUTANEOUS EVERY 30 MIN PRN
OUTPATIENT
Start: 2023-11-10

## 2023-11-10 RX ORDER — DIPHENHYDRAMINE HYDROCHLORIDE 50 MG/ML
50 INJECTION INTRAMUSCULAR; INTRAVENOUS
Start: 2023-11-10

## 2023-11-10 RX ORDER — LIDOCAINE HYDROCHLORIDE 10 MG/ML
20 INJECTION, SOLUTION EPIDURAL; INFILTRATION; INTRACAUDAL; PERINEURAL ONCE
Status: COMPLETED | OUTPATIENT
Start: 2023-11-10 | End: 2023-11-10

## 2023-11-10 RX ADMIN — LIDOCAINE HYDROCHLORIDE 10 ML: 10 INJECTION, SOLUTION EPIDURAL; INFILTRATION; INTRACAUDAL; PERINEURAL at 12:15

## 2023-11-10 RX ADMIN — ALBUMIN HUMAN 12.5 G: 0.25 SOLUTION INTRAVENOUS at 12:52

## 2023-11-10 RX ADMIN — ALBUMIN HUMAN 12.5 G: 0.25 SOLUTION INTRAVENOUS at 12:27

## 2023-11-10 RX ADMIN — ALBUMIN HUMAN 12.5 G: 0.25 SOLUTION INTRAVENOUS at 12:38

## 2023-11-10 RX ADMIN — ALBUMIN HUMAN 12.5 G: 0.25 SOLUTION INTRAVENOUS at 12:18

## 2023-11-10 NOTE — LETTER
11/10/2023         RE: Priya Castano  [address]  [place] [place] [address]        Dear Colleague,    Thank you for referring your patient, Priya Castano, to the [facility]. Please see a copy of my visit note below.    Paracentesis Nursing Note  [facility] Cast[facility].    During today's appointment orders from Dr Mendez were completed.    Progress Note:  Patient identification verified by name and date of birth.  Assessment completed.  Vitals monitored throughout appointment and recorded in Doc Flowsheets.  See proceduralist note in ultrasound.    Vascular Access: peripheral IV placed today.  Labs: were not ordered for this appointment.    Date of consent or authorization: 11/10/23.  Invasive Procedure Safety Checklist was completed and sent for scanning.     Paracentesis performed by Dr Hatfield.    The following labs were communicated to provider performing paracentesis:  Lab Results   Component Value Date     11/07/2023       Total amount of ascites fluid drained: 6 liters.  Color of ascites fluid: yellow.  Total amount of albumin given: 50  grams.    Patient tolerated procedure well.    Post procedure,denies pain or discomfort post paracentesis.      Discharge Plan:  Discharge instructions were reviewed with patient.  Patient/Representative verbalized understanding and all questions were answered.   [facility] in stable condition.    Priscilla Jarrett RN    Administrations This Visit       albumin human 25 % injection 12.5 g       Admin Date  11/10/2023 Action  $New Bag Dose  12.5 g Route  Intravenous Documented By  Priscilla Jarrett RN               Admin Date  11/10/2023 Action  $New Bag Dose  12.5 g Route  Intravenous Documented By  Priscilla Jarrett RN               Admin Date  11/10/2023  Action  $New Bag Dose  12.5 g Route  Intravenous Documented By  Priscilla Jarrett, JANICE               Admin Date  11/10/2023 Action  $New Bag Dose  12.5 g Route  Intravenous Documented By  Priscilla Jarrett, JANICE              lidocaine (PF) (XYLOCAINE) 1 % injection 20 mL       Admin Date  11/10/2023 Action  $Given by Other Dose  10 mL Route  Subcutaneous Documented By  Yaritza Olmedo                    /85   Pulse 106   Temp 97.9  F (36.6  C) (Oral)   Resp 18   SpO2 98%         Again, thank you for allowing me to participate in the care of your patient.        Sincerely,        [facility] Paracentesis Provider

## 2023-11-10 NOTE — PROGRESS NOTES
Paracentesis Nursing Note  Priya Castano presents [facility] for a [facility].    During today's appointment orders from Dr Mendez were completed.    Progress Note:  Patient identification verified by name and date of birth.  Assessment completed.  Vitals monitored throughout appointment and recorded in Doc Flowsheets.  See proceduralist note in ultrasound.    Vascular Access: peripheral IV placed today.  Labs: were not ordered for this appointment.    Date of consent or authorization: 11/10/23.  Invasive Procedure Safety Checklist was completed and sent for scanning.     Paracentesis performed by Dr Hatfield.    The following labs were communicated to provider performing paracentesis:  Lab Results   Component Value Date     11/07/2023       Total amount of ascites fluid drained: 6 liters.  Color of ascites fluid: yellow.  Total amount of albumin given: 50  grams.    Patient tolerated procedure well.    Post procedure,denies pain or discomfort post paracentesis.      Discharge Plan:  Discharge instructions were reviewed with patient.  Patient/Representative verbalized understanding and all questions were answered.   [facility] in stable condition.    Priscilla Jarrett RN    Administrations This Visit       albumin human 25 % injection 12.5 g       Admin Date  11/10/2023 Action  $New Bag Dose  12.5 g Route  Intravenous Documented By  Priscilla Jarrett RN               Admin Date  11/10/2023 Action  $New Bag Dose  12.5 g Route  Intravenous Documented By  Priscilla Jarrett RN               Admin Date  11/10/2023 Action  $New Bag Dose  12.5 g Route  Intravenous Documented By  Priscilla Jarrett RN               Admin Date  11/10/2023 Action  $New Bag Dose  12.5 g Route  Intravenous Documented By  Priscilla Jarrett RN              lidocaine (PF) (XYLOCAINE)  1 % injection 20 mL       Admin Date  11/10/2023 Action  $Given by Other Dose  10 mL Route  Subcutaneous Documented By  Yaritza Olmedo                    /85   Pulse 106   Temp 97.9  F (36.6  C) (Oral)   Resp 18   SpO2 98%

## 2023-11-10 NOTE — PATIENT INSTRUCTIONS
Dear Priya Castano    Thank you for choosing [facility] ([facility]) for your paracentesis.  The following information is a summary of our appointment as well as important reminders.      If you are a transplant patient and require transplant education, please click on this link: [email or web address].    We look forward in seeing you on your next appointment here at [facility] ([facility]).  Please don t hesitate to call us at [phone] to reschedule any of your appointments or to speak with one of the [facility] registered nurses.  It was a pleasure taking care of you today.    Sincerely,    [facility]  [facility]  [address] [place] [address]  [address]  Phone:  [phone]

## 2023-11-13 ENCOUNTER — TELEPHONE (OUTPATIENT)
Dept: GASTROENTEROLOGY | Facility: CLINIC | Age: 28
End: 2023-11-13

## 2023-11-16 RX ORDER — FUROSEMIDE 20 MG
40 TABLET ORAL DAILY
Qty: 180 TABLET | Refills: 1 | Status: ON HOLD | OUTPATIENT
Start: 2023-11-16 | End: 2023-12-14

## 2023-11-16 RX ORDER — SPIRONOLACTONE 50 MG/1
100 TABLET, FILM COATED ORAL DAILY
Qty: 180 TABLET | Refills: 1 | Status: SHIPPED | OUTPATIENT
Start: 2023-11-16 | End: 2023-11-28

## 2023-11-21 ENCOUNTER — LAB (OUTPATIENT)
Dept: LAB | Facility: CLINIC | Age: 28
End: 2023-11-21

## 2023-11-21 DIAGNOSIS — K70.11 ALCOHOLIC HEPATITIS WITH ASCITES (H): ICD-10-CM

## 2023-11-21 DIAGNOSIS — K70.31 ASCITES DUE TO ALCOHOLIC CIRRHOSIS (H): ICD-10-CM

## 2023-11-21 LAB
ANION GAP SERPL CALCULATED.3IONS-SCNC: 9 MMOL/L (ref 7–15)
BUN SERPL-MCNC: 11.5 MG/DL (ref 6–20)
CALCIUM SERPL-MCNC: 7.9 MG/DL (ref 8.6–10)
CHLORIDE SERPL-SCNC: 104 MMOL/L (ref 98–107)
CREAT SERPL-MCNC: 0.73 MG/DL (ref 0.51–0.95)
DEPRECATED HCO3 PLAS-SCNC: 21 MMOL/L (ref 22–29)
EGFRCR SERPLBLD CKD-EPI 2021: >90 ML/MIN/1.73M2
GLUCOSE SERPL-MCNC: 97 MG/DL (ref 70–99)
POTASSIUM SERPL-SCNC: 3.7 MMOL/L (ref 3.4–5.3)
SODIUM SERPL-SCNC: 134 MMOL/L (ref 135–145)

## 2023-11-21 PROCEDURE — 80048 BASIC METABOLIC PNL TOTAL CA: CPT

## 2023-11-21 PROCEDURE — 36415 COLL VENOUS BLD VENIPUNCTURE: CPT

## 2023-11-22 ENCOUNTER — PATIENT OUTREACH (OUTPATIENT)
Dept: GASTROENTEROLOGY | Facility: CLINIC | Age: 28
End: 2023-11-22

## 2023-11-22 NOTE — PROGRESS NOTES
"Spoke with pt for check in and initiation of care coordination. Pt had blood transfusion on 11/8 and paracentesis on 11/10 with 6L output. She states she is feeling \"much better\" after both of these appts - states she has more energy and is feeling less distended. However, she reports that the radiologist told her at her paracentesis appt that she still had fluid left to drain and she's been experiencing swelling in her feet and ankles. Provided education to pt on nonpharmacologic interventions for pedal edema management (elevating legs, compression stockings, low sodium diet), per Dr. Mendez OK for pt to start lasix 20mg daily and spironolactone 50mg daily with BMP check 3-5 days later. Pt verbalized understanding, states she will diuretics and have labs checked on 11/21.  Denies any melena, hematochezia, or hematemesis. Denies any fever, chills, or sweats. Denies any mental fogginess or overt confusion. Will check back in with pt with lab results.    Nadja Felder RN Care Coordinator  [facility]  Hepatology Clinic/Specialty Program    "

## 2023-11-22 NOTE — PROGRESS NOTES
Called and spoke with pt for check in. Per Dr. Mendez, yesterday's lab results look good on current diuretics doses, can try to increase water pills if ascites comes back. Pt verbalized understanding, states she hasn't noticed much of a difference in swelling on water pills so far, but endorses that when she's been on them in the past it has taken 1-2 weeks to notice full effect. She reports mild abdominal distention, but notes improvement in leg swelling. Pt is scheduled for paracentesis next on Monday 11/27, will reassess for need to increase diuretics after this para appt.  Denies any melena, hematochezia, or hematemesis. Denies any fever, chills, or sweats. Denies any mental fogginess or overt confusion.   Will check back in with pt in 1 week.    Nadja Felder, RN Care Coordinator  [facility]  Hepatology Clinic/Specialty Program

## 2023-11-27 ENCOUNTER — OFFICE VISIT (OUTPATIENT)
Dept: INFUSION THERAPY | Facility: CLINIC | Age: 28
End: 2023-11-27
Attending: STUDENT IN AN ORGANIZED HEALTH CARE EDUCATION/TRAINING PROGRAM

## 2023-11-27 ENCOUNTER — ANCILLARY PROCEDURE (OUTPATIENT)
Dept: ULTRASOUND IMAGING | Facility: CLINIC | Age: 28
End: 2023-11-27
Attending: STUDENT IN AN ORGANIZED HEALTH CARE EDUCATION/TRAINING PROGRAM

## 2023-11-27 VITALS
DIASTOLIC BLOOD PRESSURE: 62 MMHG | WEIGHT: 204.6 LBS | RESPIRATION RATE: 18 BRPM | HEART RATE: 118 BPM | SYSTOLIC BLOOD PRESSURE: 111 MMHG | OXYGEN SATURATION: 99 % | BODY MASS INDEX: 36.83 KG/M2 | TEMPERATURE: 97.9 F

## 2023-11-27 DIAGNOSIS — K70.31 ASCITES DUE TO ALCOHOLIC CIRRHOSIS (H): Primary | ICD-10-CM

## 2023-11-27 PROCEDURE — 49083 ABD PARACENTESIS W/IMAGING: CPT

## 2023-11-27 PROCEDURE — 250N000009 HC RX 250: Performed by: STUDENT IN AN ORGANIZED HEALTH CARE EDUCATION/TRAINING PROGRAM

## 2023-11-27 PROCEDURE — 49083 ABD PARACENTESIS W/IMAGING: CPT | Performed by: RADIOLOGY

## 2023-11-27 PROCEDURE — 250N000011 HC RX IP 250 OP 636: Performed by: STUDENT IN AN ORGANIZED HEALTH CARE EDUCATION/TRAINING PROGRAM

## 2023-11-27 PROCEDURE — P9047 ALBUMIN (HUMAN), 25%, 50ML: HCPCS | Performed by: STUDENT IN AN ORGANIZED HEALTH CARE EDUCATION/TRAINING PROGRAM

## 2023-11-27 RX ORDER — ALBUMIN (HUMAN) 12.5 G/50ML
12.5 SOLUTION INTRAVENOUS
Status: CANCELLED | OUTPATIENT
Start: 2023-11-27

## 2023-11-27 RX ORDER — HEPARIN SODIUM (PORCINE) LOCK FLUSH IV SOLN 100 UNIT/ML 100 UNIT/ML
5 SOLUTION INTRAVENOUS
OUTPATIENT
Start: 2023-11-27

## 2023-11-27 RX ORDER — DIPHENHYDRAMINE HYDROCHLORIDE 50 MG/ML
50 INJECTION INTRAMUSCULAR; INTRAVENOUS
Start: 2023-11-27

## 2023-11-27 RX ORDER — MEPERIDINE HYDROCHLORIDE 25 MG/ML
25 INJECTION INTRAMUSCULAR; INTRAVENOUS; SUBCUTANEOUS EVERY 30 MIN PRN
OUTPATIENT
Start: 2023-11-27

## 2023-11-27 RX ORDER — ALBUMIN (HUMAN) 12.5 G/50ML
12.5 SOLUTION INTRAVENOUS
Status: COMPLETED | OUTPATIENT
Start: 2023-11-27 | End: 2023-11-27

## 2023-11-27 RX ORDER — EPINEPHRINE 1 MG/ML
0.3 INJECTION, SOLUTION INTRAMUSCULAR; SUBCUTANEOUS EVERY 5 MIN PRN
OUTPATIENT
Start: 2023-11-27

## 2023-11-27 RX ORDER — LIDOCAINE HYDROCHLORIDE 10 MG/ML
20 INJECTION, SOLUTION EPIDURAL; INFILTRATION; INTRACAUDAL; PERINEURAL ONCE
OUTPATIENT
Start: 2023-11-27 | End: 2023-11-27

## 2023-11-27 RX ORDER — METHYLPREDNISOLONE SODIUM SUCCINATE 125 MG/2ML
125 INJECTION, POWDER, LYOPHILIZED, FOR SOLUTION INTRAMUSCULAR; INTRAVENOUS
Start: 2023-11-27

## 2023-11-27 RX ORDER — HEPARIN SODIUM,PORCINE 10 UNIT/ML
5-20 VIAL (ML) INTRAVENOUS DAILY PRN
OUTPATIENT
Start: 2023-11-27

## 2023-11-27 RX ORDER — ALBUTEROL SULFATE 0.83 MG/ML
2.5 SOLUTION RESPIRATORY (INHALATION)
OUTPATIENT
Start: 2023-11-27

## 2023-11-27 RX ORDER — ALBUTEROL SULFATE 90 UG/1
1-2 AEROSOL, METERED RESPIRATORY (INHALATION)
Start: 2023-11-27

## 2023-11-27 RX ORDER — LIDOCAINE HYDROCHLORIDE 10 MG/ML
20 INJECTION, SOLUTION EPIDURAL; INFILTRATION; INTRACAUDAL; PERINEURAL ONCE
Status: COMPLETED | OUTPATIENT
Start: 2023-11-27 | End: 2023-11-27

## 2023-11-27 RX ADMIN — ALBUMIN HUMAN 12.5 G: 0.25 SOLUTION INTRAVENOUS at 12:41

## 2023-11-27 RX ADMIN — ALBUMIN HUMAN 12.5 G: 0.25 SOLUTION INTRAVENOUS at 12:31

## 2023-11-27 RX ADMIN — ALBUMIN HUMAN 12.5 G: 0.25 SOLUTION INTRAVENOUS at 12:52

## 2023-11-27 RX ADMIN — LIDOCAINE HYDROCHLORIDE 10 ML: 10 INJECTION, SOLUTION EPIDURAL; INFILTRATION; INTRACAUDAL; PERINEURAL at 12:16

## 2023-11-27 RX ADMIN — ALBUMIN HUMAN 12.5 G: 0.25 SOLUTION INTRAVENOUS at 12:22

## 2023-11-27 NOTE — PATIENT INSTRUCTIONS
Dear Priay Castano    Thank you for choosing [facility] ([facility]) for your paracentesis.  The following information is a summary of our appointment as well as important reminders.      If you are a transplant patient and require transplant education, please click on this link: [email or web address].    We look forward in seeing you on your next appointment here at [facility] ([facility]).  Please don t hesitate to call us at [phone] to reschedule any of your appointments or to speak with one of the [facility] registered nurses.  It was a pleasure taking care of you today.    Sincerely,    [facility]  [facility]  [address] [place] [address]  [address]  Phone:  [phone]

## 2023-11-27 NOTE — PROGRESS NOTES
Paracentesis Nursing Note  Priya Castano presents today to [facility] for a paracentesis.    During today's appointment orders from Dr Luci Mendez were completed.    Progress Note:  Patient identification verified by name and date of birth.  Assessment completed.  Vitals monitored throughout appointment and recorded in Doc Flowsheets.  See proceduralist note in ultrasound.    Vascular Access: peripheral IV placed today.  Labs: were not ordered for this appointment.    Date of consent or authorization: 11/10/23.  Invasive Procedure Safety Checklist was completed and sent for scanning.     Paracentesis performed by Dr Corona.    The following labs were communicated to provider performing paracentesis:  Lab Results   Component Value Date     11/07/2023       Total amount of ascites fluid drained: 6 liters.  Color of ascites fluid: yellow.  Total amount of albumin given: 50  grams.    Patient tolerated procedure well.    Post procedure,denies pain or discomfort post paracentesis.    Discharge Plan:  Discharge instructions were reviewed with patient.  Patient/Representative verbalized understanding and all questions were answered.   Discharged from [facility] in stable condition.    Priscilla Jarrett RN    Administrations This Visit       albumin human 25 % injection 12.5 g       Admin Date  11/27/2023 Action  $New Bag Dose  12.5 g Route  Intravenous Documented By  Priscilla Jarrett RN               Admin Date  11/27/2023 Action  $New Bag Dose  12.5 g Route  Intravenous Documented By  Priscilla Jarrett RN               Admin Date  11/27/2023 Action  $New Bag Dose  12.5 g Route  Intravenous Documented By  Priscilla Jarrett RN               Admin Date  11/27/2023 Action  $New Bag Dose  12.5 g Route  Intravenous Documented By  Priscilla Jarrett RN              lidocaine (PF) (XYLOCAINE)  1 % injection 20 mL       Admin Date  11/27/2023 Action  $Given by Other Dose  10 mL Route  Subcutaneous Documented By  Priscilla Jarrett, JANICE                    /62   Pulse 118   Temp 97.9  F (36.6  C) (Oral)   Resp 18   Wt 98.6 kg (217 lb 6.4 oz)   SpO2 99%   BMI 39.13 kg/m

## 2023-11-27 NOTE — LETTER
11/27/2023         RE: Priya Castano  [address]  [place] [address]        Dear Colleague,    Thank you for referring your patient, Priya Castano, to the [facility]. Please see a copy of my visit note below.    Paracentesis Nursing Note  Priya Castano presents today to [facility] for a paracentesis.    During today's appointment orders from Dr Luci Mendez were completed.    Progress Note:  Patient identification verified by name and date of birth.  Assessment completed.  Vitals monitored throughout appointment and recorded in Doc Flowsheets.  See proceduralist note in ultrasound.    Vascular Access: peripheral IV placed today.  Labs: were not ordered for this appointment.    Date of consent or authorization: 11/10/23.  Invasive Procedure Safety Checklist was completed and sent for scanning.     Paracentesis performed by Dr Corona.    The following labs were communicated to provider performing paracentesis:  Lab Results   Component Value Date     11/07/2023       Total amount of ascites fluid drained: 6 liters.  Color of ascites fluid: yellow.  Total amount of albumin given: 50  grams.    Patient tolerated procedure well.    Post procedure,denies pain or discomfort post paracentesis.    Discharge Plan:  Discharge instructions were reviewed with patient.  Patient/Representative verbalized understanding and all questions were answered.   Discharged from [facility] in stable condition.    Priscilla Jarrett RN    Administrations This Visit       albumin human 25 % injection 12.5 g       Admin Date  11/27/2023 Action  $New Bag Dose  12.5 g Route  Intravenous Documented By  Priscilla Jarrett RN               Admin Date  11/27/2023 Action  $New Bag Dose  12.5 g Route  Intravenous Documented By  Priscilla Jarrett RN               Admin Date  11/27/2023  Action  $New Bag Dose  12.5 g Route  Intravenous Documented By  Priscilla Jarrett, JANICE               Admin Date  11/27/2023 Action  $New Bag Dose  12.5 g Route  Intravenous Documented By  Priscilla Jarrett RN              lidocaine (PF) (XYLOCAINE) 1 % injection 20 mL       Admin Date  11/27/2023 Action  $Given by Other Dose  10 mL Route  Subcutaneous Documented By  Priscilla Jarrett, JANICE                    /62   Pulse 118   Temp 97.9  F (36.6  C) (Oral)   Resp 18   Wt 98.6 kg (217 lb 6.4 oz)   SpO2 99%   BMI 39.13 kg/m        Again, thank you for allowing me to participate in the care of your patient.        Sincerely,        [facility] Provider

## 2023-11-28 ENCOUNTER — PATIENT OUTREACH (OUTPATIENT)
Dept: GASTROENTEROLOGY | Facility: CLINIC | Age: 28
End: 2023-11-28

## 2023-11-28 DIAGNOSIS — K70.11 ALCOHOLIC HEPATITIS WITH ASCITES (H): ICD-10-CM

## 2023-11-28 RX ORDER — FUROSEMIDE 40 MG
40 TABLET ORAL 2 TIMES DAILY
Qty: 180 TABLET | Refills: 1 | Status: ON HOLD | OUTPATIENT
Start: 2023-11-28 | End: 2023-12-10

## 2023-11-28 RX ORDER — SPIRONOLACTONE 100 MG/1
100 TABLET, FILM COATED ORAL 2 TIMES DAILY
Qty: 180 TABLET | Refills: 1 | Status: SHIPPED | OUTPATIENT
Start: 2023-11-28 | End: 2023-12-05

## 2023-11-28 NOTE — PROGRESS NOTES
Per Dr. Mendez, because pt needed paracentesis yesterday (with 6L output) and has scheduled another for 12/15, she should increase diuretics to furosemide 40mg BID and spironolactone 100mg BID with BMP check 3-5 days after dose change.    Attempted to reach patient for check in, no answer, message left requesting call back, number provided.    Nadja Felder, RN Care Coordinator  [facility]  Hepatology Clinic/Specialty Program

## 2023-12-04 ENCOUNTER — HOSPITAL ENCOUNTER (OUTPATIENT)
Facility: CLINIC | Age: 28
End: 2023-12-04

## 2023-12-04 ENCOUNTER — HOSPITAL ENCOUNTER (INPATIENT)
Facility: HOSPITAL | Age: 28
LOS: 5 days | Discharge: ANOTHER HOSPITAL | DRG: 432 | End: 2023-12-10
Attending: EMERGENCY MEDICINE | Admitting: INTERNAL MEDICINE

## 2023-12-04 DIAGNOSIS — D64.9 ANEMIA, UNSPECIFIED TYPE: ICD-10-CM

## 2023-12-04 DIAGNOSIS — K70.31 ALCOHOLIC CIRRHOSIS OF LIVER WITH ASCITES (H): ICD-10-CM

## 2023-12-04 DIAGNOSIS — R65.10 SIRS (SYSTEMIC INFLAMMATORY RESPONSE SYNDROME) (H): ICD-10-CM

## 2023-12-04 LAB
ABO/RH(D): NORMAL
ALBUMIN SERPL BCG-MCNC: 2.4 G/DL (ref 3.5–5.2)
ALP SERPL-CCNC: 244 U/L (ref 40–150)
ALT SERPL W P-5'-P-CCNC: 34 U/L (ref 0–50)
ANION GAP SERPL CALCULATED.3IONS-SCNC: 13 MMOL/L (ref 7–15)
ANTIBODY SCREEN: NEGATIVE
AST SERPL W P-5'-P-CCNC: 155 U/L (ref 0–45)
BASOPHILS # BLD AUTO: 0.1 10E3/UL (ref 0–0.2)
BASOPHILS NFR BLD AUTO: 0 %
BILIRUB SERPL-MCNC: 3.6 MG/DL
BLD PROD TYP BPU: NORMAL
BLD PROD TYP BPU: NORMAL
BLOOD COMPONENT TYPE: NORMAL
BLOOD COMPONENT TYPE: NORMAL
BUN SERPL-MCNC: 5.6 MG/DL (ref 6–20)
CALCIUM SERPL-MCNC: 7.4 MG/DL (ref 8.6–10)
CHLORIDE SERPL-SCNC: 101 MMOL/L (ref 98–107)
CODING SYSTEM: NORMAL
CODING SYSTEM: NORMAL
CREAT SERPL-MCNC: 0.67 MG/DL (ref 0.51–0.95)
CROSSMATCH: NORMAL
CROSSMATCH: NORMAL
DEPRECATED HCO3 PLAS-SCNC: 18 MMOL/L (ref 22–29)
EGFRCR SERPLBLD CKD-EPI 2021: >90 ML/MIN/1.73M2
EOSINOPHIL # BLD AUTO: 0.1 10E3/UL (ref 0–0.7)
EOSINOPHIL NFR BLD AUTO: 0 %
ERYTHROCYTE [DISTWIDTH] IN BLOOD BY AUTOMATED COUNT: 23.9 % (ref 10–15)
GLUCOSE SERPL-MCNC: 107 MG/DL (ref 70–99)
HCT VFR BLD AUTO: 18 % (ref 35–47)
HGB BLD-MCNC: 5.5 G/DL (ref 11.7–15.7)
IMM GRANULOCYTES # BLD: 0.7 10E3/UL
IMM GRANULOCYTES NFR BLD: 4 %
ISSUE DATE AND TIME: NORMAL
ISSUE DATE AND TIME: NORMAL
LYMPHOCYTES # BLD AUTO: 2 10E3/UL (ref 0.8–5.3)
LYMPHOCYTES NFR BLD AUTO: 13 %
MCH RBC QN AUTO: 27.4 PG (ref 26.5–33)
MCHC RBC AUTO-ENTMCNC: 30.6 G/DL (ref 31.5–36.5)
MCV RBC AUTO: 90 FL (ref 78–100)
MONOCYTES # BLD AUTO: 1.1 10E3/UL (ref 0–1.3)
MONOCYTES NFR BLD AUTO: 7 %
NEUTROPHILS # BLD AUTO: 12.4 10E3/UL (ref 1.6–8.3)
NEUTROPHILS NFR BLD AUTO: 76 %
NRBC # BLD AUTO: 0 10E3/UL
NRBC BLD AUTO-RTO: 0 /100
PLATELET # BLD AUTO: 192 10E3/UL (ref 150–450)
POTASSIUM SERPL-SCNC: 3.7 MMOL/L (ref 3.4–5.3)
PROT SERPL-MCNC: 7.2 G/DL (ref 6.4–8.3)
RBC # BLD AUTO: 2.01 10E6/UL (ref 3.8–5.2)
SODIUM SERPL-SCNC: 132 MMOL/L (ref 135–145)
SPECIMEN EXPIRATION DATE: NORMAL
UNIT ABO/RH: NORMAL
UNIT ABO/RH: NORMAL
UNIT NUMBER: NORMAL
UNIT NUMBER: NORMAL
UNIT STATUS: NORMAL
UNIT STATUS: NORMAL
UNIT TYPE ISBT: 5100
UNIT TYPE ISBT: 5100
WBC # BLD AUTO: 16.2 10E3/UL (ref 4–11)

## 2023-12-04 PROCEDURE — 80053 COMPREHEN METABOLIC PANEL: CPT | Performed by: EMERGENCY MEDICINE

## 2023-12-04 PROCEDURE — 250N000013 HC RX MED GY IP 250 OP 250 PS 637: Performed by: EMERGENCY MEDICINE

## 2023-12-04 PROCEDURE — 84145 PROCALCITONIN (PCT): CPT | Performed by: EMERGENCY MEDICINE

## 2023-12-04 PROCEDURE — P9016 RBC LEUKOCYTES REDUCED: HCPCS | Performed by: EMERGENCY MEDICINE

## 2023-12-04 PROCEDURE — 86923 COMPATIBILITY TEST ELECTRIC: CPT | Performed by: EMERGENCY MEDICINE

## 2023-12-04 PROCEDURE — 86923 COMPATIBILITY TEST ELECTRIC: CPT | Performed by: INTERNAL MEDICINE

## 2023-12-04 PROCEDURE — 99285 EMERGENCY DEPT VISIT HI MDM: CPT | Mod: 25

## 2023-12-04 PROCEDURE — 250N000011 HC RX IP 250 OP 636: Mod: JZ | Performed by: EMERGENCY MEDICINE

## 2023-12-04 PROCEDURE — 85025 COMPLETE CBC W/AUTO DIFF WBC: CPT | Performed by: EMERGENCY MEDICINE

## 2023-12-04 PROCEDURE — 0W9G3ZZ DRAINAGE OF PERITONEAL CAVITY, PERCUTANEOUS APPROACH: ICD-10-PCS | Performed by: EMERGENCY MEDICINE

## 2023-12-04 PROCEDURE — 36415 COLL VENOUS BLD VENIPUNCTURE: CPT | Performed by: EMERGENCY MEDICINE

## 2023-12-04 PROCEDURE — 86850 RBC ANTIBODY SCREEN: CPT | Performed by: EMERGENCY MEDICINE

## 2023-12-04 PROCEDURE — 86901 BLOOD TYPING SEROLOGIC RH(D): CPT | Performed by: EMERGENCY MEDICINE

## 2023-12-04 PROCEDURE — 96374 THER/PROPH/DIAG INJ IV PUSH: CPT

## 2023-12-04 PROCEDURE — 36430 TRANSFUSION BLD/BLD COMPNT: CPT

## 2023-12-04 RX ORDER — LORAZEPAM 0.5 MG/1
0.5 TABLET ORAL ONCE
Status: COMPLETED | OUTPATIENT
Start: 2023-12-04 | End: 2023-12-04

## 2023-12-04 RX ORDER — OXYCODONE HYDROCHLORIDE 5 MG/1
5 TABLET ORAL ONCE
Status: COMPLETED | OUTPATIENT
Start: 2023-12-04 | End: 2023-12-04

## 2023-12-04 RX ORDER — ONDANSETRON 2 MG/ML
4 INJECTION INTRAMUSCULAR; INTRAVENOUS ONCE
Status: COMPLETED | OUTPATIENT
Start: 2023-12-04 | End: 2023-12-04

## 2023-12-04 RX ADMIN — LORAZEPAM 0.5 MG: 0.5 TABLET ORAL at 23:02

## 2023-12-04 RX ADMIN — OXYCODONE HYDROCHLORIDE 5 MG: 5 TABLET ORAL at 18:14

## 2023-12-04 RX ADMIN — ONDANSETRON 4 MG: 2 INJECTION INTRAMUSCULAR; INTRAVENOUS at 18:14

## 2023-12-04 ASSESSMENT — ACTIVITIES OF DAILY LIVING (ADL)
ADLS_ACUITY_SCORE: 35

## 2023-12-04 NOTE — ED TRIAGE NOTES
Pt  here as she needs a paracentesis. Last had one last week.  Pt is in the process of moving and she packed up her diuretics and so hasn't been able to take them and her belly filled up with  fluid. Pt has been sober from [facility] since July 2023.  Hr in triage 131.

## 2023-12-04 NOTE — ED PROVIDER NOTES
EMERGENCY DEPARTMENT ENCOUNTER      NAME: Priya Castano  AGE: 28 year old female  YOB: 1995  MRN: [id number]  EVALUATION DATE & TIME: 2023  5:25 PM    PCP: No Ref-Primary, Physician    ED PROVIDER: Abhijit Obregon M.D.      Chief Complaint   Patient presents with    needs paracentesis         FINAL IMPRESSION:  1. Anemia, unspecified type    2. Alcoholic cirrhosis of liver with ascites (H)          ED COURSE & MEDICAL DECISION MAKIN:42 PM Met with patient for initial interview and exam.  11:04 PM Rechecked patient.  12:47 AM Rechecked on patient.  Notified by ED RN that the patient now having a fever to 101.1.  Discussed with the patient expanding her workup to include adding on some sepsis lab evaluations and paracentesis labs.  Patient in agreement.  Updated about possibility need for admission.      28 year old female presents to the Emergency Department for evaluation of shortness of breath and abdominal distention.  Patient follows with a liver specialist at the [place] for alcoholic cirrhosis with ascites.  Has been sober since the middle of the year.  Has had increasing abdominal ascites requiring outpatient paracentesis most recently about a week ago.  Unfortunately was moving recently and missed a few days of her Lasix causing more rapid fluid accumulation.  She reports this is now causing some abdominal distention and dyspnea.  She arrives to the emergency department tachycardic, she has been tachycardic to the mid 110s on essentially all of her outpatient and ED visits in the past.  She has abdominal distention and fluid wave but no tenderness or peritoneal signs.  IV access and labs were obtained.  CBC does reveal a leukocytosis to 16, again this appears to be essentially chronic, not having ever been normal this year.  Patient does not have any infectious symptoms or exam signs today.  Also concerning the the patient's hemoglobin has now declined to 5.5.  I can see that  she received a blood transfusion recently.  Her anemia is normocytic.  She does not have any symptoms of GI bleeding.  Certainly could be contributing to some of her symptoms and we discussed a transfusion here.  Patient was most concerned about getting a therapeutic paracentesis completed.  Radiology not available after hours, so she was consented for a bedside paracentesis, see procedure note below.  I was able to withdrawal about 3.6 L of panchito-colored fluid at which point the fluid returned stopped and the procedure was completed.  Patient tolerated this well and was feeling improved on recheck.  Patient had been persistently desiring to discharge after this and was planning to complete her transfusion and follow-up in clinic.    Notified by ED RN that the patient had developed a fever to 101.1, this had been slightly increasing even prior to starting her second unit of PRBC transfusions.  Differential would include febrile transfusion reaction, sepsis, COVID-19, SBP, etc.  Discussed with the patient the need to add on some additional lab evaluations including blood cultures, lactic acid level, paracentesis labs.  Patient now was in agreement with admission especially given her persistent tachycardia with interval development of fever.  Some additional fever workup was added and patient was signed out to oncoming ED provider Dr. Forrest, refer to his note for remainder of ED course.      Medical Decision Making    History:  Supplemental history from: N/A  External Record(s) reviewed: Outpatient Record: [facility] 11/27/2023     Work Up:  Chart documentation includes differential considered and any EKGs or imaging independently interpreted by provider, where specified.  In additional to work up documented, I considered the following work up: Documented in chart, if applicable.    External consultation:  Discussion of management with another provider: Documented in  chart, if applicable    Complicating factors:  Care impacted by chronic illness: Other: Hypothyroidism  Care affected by social determinants of health: N/A    Disposition considerations: Admit            MEDICATIONS GIVEN IN THE EMERGENCY:  Medications   oxyCODONE (ROXICODONE) tablet 5 mg (5 mg Oral $Given 12/4/23 1814)   ondansetron (ZOFRAN) injection 4 mg (4 mg Intravenous $Given 12/4/23 1814)   LORazepam (ATIVAN) tablet 0.5 mg (0.5 mg Oral $Given 12/4/23 2302)       NEW PRESCRIPTIONS STARTED AT TODAY'S ER VISIT  New Prescriptions    No medications on file          =================================================================    HPI    Patient information was obtained from: Patient.    Use of : N/A         Priya Castano is a 28 year old female with a pertinent history of hypothyroidism, acute alcoholic hepatitis, and ascites due to alcoholic cirrhosis who presents to this ED by walk in for evaluation of needed paracentesis.    Per chart review, the patient was seen at [facility] on 11/27/2023 (1 week ago) for paracentesis. The patient is diagnosed with ascites due to alcoholic cirrhosis. . Paracentesis was performed by Dr. Corona and 6 left of ascites fluid was drained, 50 g albumin given. Patient tolerated procedure well, denied pain or discomfort.    The patient reports abdominal bloating and states that the pressure from this has been causing shortness of breath and abdominal pain. She endorses nausea and leg swelling. The patient states that she recently went on a trip and forgot to take her diuretics for a few days, but she has started taking them again.    She states that she is on a 6L limit and has been recommended to have a paracentesis completed every 2 weeks.    The patient denies fever or vomiting.    REVIEW OF SYSTEMS   All systems reviewed and negative except as noted in HPI.    PAST MEDICAL HISTORY:  Past Medical History:   Diagnosis  "Date    Hypothyroidism        PAST SURGICAL HISTORY:  Past Surgical History:   Procedure Laterality Date    IR PARACENTESIS  2023           CURRENT MEDICATIONS:    No current facility-administered medications for this encounter.     Current Outpatient Medications   Medication    furosemide (LASIX) 20 MG tablet    furosemide (LASIX) 40 MG tablet    spironolactone (ALDACTONE) 100 MG tablet         ALLERGIES:  Allergies   Allergen Reactions    Bee Venom Hives and Shortness Of Breath       FAMILY HISTORY:  Family History   Problem Relation Age of Onset    Thyroid Disease Mother        SOCIAL HISTORY:   Social History     Socioeconomic History    Marital status: Single   Tobacco Use    Smoking status: Never    Smokeless tobacco: Never   Substance and Sexual Activity    Alcohol use: Yes     Comment: 1 bottle wine daily    Drug use: No    Sexual activity: Never   Other Topics Concern    Parent/sibling w/ CABG, MI or angioplasty before 65F 55M? No   Social History Narrative    ** Merged History Encounter **         FAMILY INFORMATION     Date: 2006    Parent #1      Name: Pema Castano   Gender: female   : 1977      Education: Not listed   Occupation:         Parent #2      Name: Not listed   Gender: male   : Not listed     Education: Not listed   Occupation: Not listed        Siblings: not listed        Relationship Status of Parent(s):     Who does the child live with? mother and father    What language(s) is/are spoken at home? Cymraes               VITALS:  BP (!) 142/79   Pulse (!) 129   Temp 99.5  F (37.5  C) (Oral)   Resp 18   Ht 1.588 m (5' 2.5\")   Wt 94.4 kg (208 lb 3.2 oz)   SpO2 98%   BMI 37.47 kg/m      PHYSICAL EXAM    Constitutional: Well developed, Well nourished, NAD.  HENT: Normocephalic, Atraumatic. Neck Supple.  Eyes: EOMI, Conjunctiva normal.  Respiratory: Breathing comfortably on room air. Speaks full sentences easily. Lungs clear to " ascultation.  Cardiovascular: Normal heart rate, Regular rhythm. No peripheral edema.  Abdomen: Soft, distended with fluid wave, nontender  Musculoskeletal: Good range of motion in all major joints. No major deformities noted.  Integument: Warm, Dry.  Neurologic: Alert & awake, Normal motor function, Normal sensory function, No focal deficits noted.   Psychiatric: Cooperative. Affect appropriate.     LAB:  All pertinent labs reviewed and interpreted.  Labs Ordered and Resulted from Time of ED Arrival to Time of ED Departure   COMPREHENSIVE METABOLIC PANEL - Abnormal       Result Value    Sodium 132 (*)     Potassium 3.7      Carbon Dioxide (CO2) 18 (*)     Anion Gap 13      Urea Nitrogen 5.6 (*)     Creatinine 0.67      GFR Estimate >90      Calcium 7.4 (*)     Chloride 101      Glucose 107 (*)     Alkaline Phosphatase 244 (*)      (*)     ALT 34      Protein Total 7.2      Albumin 2.4 (*)     Bilirubin Total 3.6 (*)    CBC WITH PLATELETS AND DIFFERENTIAL - Abnormal    WBC Count 16.2 (*)     RBC Count 2.01 (*)     Hemoglobin 5.5 (*)     Hematocrit 18.0 (*)     MCV 90      MCH 27.4      MCHC 30.6 (*)     RDW 23.9 (*)     Platelet Count 192      % Neutrophils 76      % Lymphocytes 13      % Monocytes 7      % Eosinophils 0      % Basophils 0      % Immature Granulocytes 4      NRBCs per 100 WBC 0      Absolute Neutrophils 12.4 (*)     Absolute Lymphocytes 2.0      Absolute Monocytes 1.1      Absolute Eosinophils 0.1      Absolute Basophils 0.1      Absolute Immature Granulocytes 0.7 (*)     Absolute NRBCs 0.0     TYPE AND SCREEN, ADULT    ABO/RH(D) O POS      Antibody Screen Negative      SPECIMEN EXPIRATION DATE [id number]     PREPARE RED BLOOD CELLS (UNIT)    Blood Component Type Red Blood Cells      Product Code J7711T00      Unit Status Transfused      Unit Number [id number]      CROSSMATCH Compatible      CODING SYSTEM [id number]      ISSUE DATE AND TIME 20231204205100      UNIT ABO/RH O+      UNIT  TYPE ISBT 5100     PREPARE RED BLOOD CELLS (UNIT)    Blood Component Type Red Blood Cells      Product Code S7908A63      Unit Status Transfused      Unit Number [id number]      CROSSMATCH Compatible      CODING SYSTEM [id number]      ISSUE DATE AND TIME [id number]      UNIT ABO/RH O+      UNIT TYPE ISBT 5100     PREPARE RED BLOOD CELLS (UNIT)   TRANSFUSE RED BLOOD CELLS (UNIT)   TRANSFUSE RED BLOOD CELLS (UNIT)   ABO/RH TYPE AND SCREEN       PROCEDURES:   [facility]    -Paracentesis    Date/Time: 12/5/2023 12:02 AM    Performed by: Abhijit Obregon MD  Authorized by: Abhijit Obregon MD    Risks, benefits and alternatives discussed.      PRE-PROCEDURE DETAILS     Procedure purpose:  Therapeutic    ANESTHESIA (see MAR for exact dosages):     Anesthesia method:  Local infiltration    Local anesthetic:  Lidocaine 1% w/o epi    PROCEDURE DETAILS     Needle gauge: 5F.    Puncture site:  L lower quadrant    Fluid removed amount:  3.6 L    Fluid appearance:  Macrina    Dressing:  Adhesive bandage      PROCEDURE  Describe Procedure: Area was prepped and draped in the usual sterile fashion.  Large fluid pocket was identified with ultrasound.  Under continuous ultrasound guidance a 5 Jamaican catheter was introduced into the peritoneal space with immediate fluid return.  Tubing was connected to vacuum containers.  Patient Tolerance:  Patient tolerated the procedure well with no immediate complications          I, Jennie Torrez, am serving as a scribe to document services personally performed by Dr. Abhijit Obregon, based on my observation and the provider's statements to me. IAbhijit MD attest that Jennie Torrez is acting in a scribe capacity, has observed my performance of the services and has documented them in accordance with my direction.    Abhijit Obregon M.D.  Emergency Medicine  [facility] EMERGENCY DEPARTMENT  [facility] [facility]  [id number]  [phone]  Dept: [phone]       Abhijit Obregon MD  12/05/23 0004       Abhijit Obregon MD  12/05/23 0103       Abhijit Obregon MD  12/05/23 0105

## 2023-12-05 ENCOUNTER — APPOINTMENT (OUTPATIENT)
Dept: ULTRASOUND IMAGING | Facility: HOSPITAL | Age: 28
DRG: 432 | End: 2023-12-05
Attending: INTERNAL MEDICINE

## 2023-12-05 ENCOUNTER — APPOINTMENT (OUTPATIENT)
Dept: RADIOLOGY | Facility: HOSPITAL | Age: 28
DRG: 432 | End: 2023-12-05
Attending: EMERGENCY MEDICINE

## 2023-12-05 PROBLEM — D64.9 ANEMIA, UNSPECIFIED TYPE: Status: ACTIVE | Noted: 2023-12-05

## 2023-12-05 PROBLEM — R65.10 SIRS (SYSTEMIC INFLAMMATORY RESPONSE SYNDROME) (H): Status: ACTIVE | Noted: 2023-12-05

## 2023-12-05 PROBLEM — K70.31 ALCOHOLIC CIRRHOSIS OF LIVER WITH ASCITES (H): Status: ACTIVE | Noted: 2023-12-05

## 2023-12-05 LAB
% LINING CELLS, BODY FLUID: 11 %
ABSOLUTE NEUTROPHILS, BODY FLUID: 1.2 /UL
ALBUMIN BODY FLUID SOURCE: NORMAL
ALBUMIN FLD-MCNC: 0.7 G/DL
ALBUMIN SERPL BCG-MCNC: 2.3 G/DL (ref 3.5–5.2)
ALBUMIN UR-MCNC: 50 MG/DL
ALP SERPL-CCNC: 238 U/L (ref 40–150)
ALT SERPL W P-5'-P-CCNC: 34 U/L (ref 0–50)
ANION GAP SERPL CALCULATED.3IONS-SCNC: 11 MMOL/L (ref 7–15)
APPEARANCE FLD: ABNORMAL
APPEARANCE UR: ABNORMAL
AST SERPL W P-5'-P-CCNC: 153 U/L (ref 0–45)
BACTERIA #/AREA URNS HPF: ABNORMAL /HPF
BILIRUB SERPL-MCNC: 3.7 MG/DL
BILIRUB UR QL STRIP: ABNORMAL
BLD PROD TYP BPU: NORMAL
BLOOD COMPONENT TYPE: NORMAL
BUN SERPL-MCNC: 5.5 MG/DL (ref 6–20)
CALCIUM SERPL-MCNC: 7.1 MG/DL (ref 8.6–10)
CELL COUNT BODY FLUID SOURCE: ABNORMAL
CHLORIDE SERPL-SCNC: 101 MMOL/L (ref 98–107)
CODING SYSTEM: NORMAL
COLOR FLD: YELLOW
COLOR UR AUTO: ABNORMAL
CREAT SERPL-MCNC: 0.8 MG/DL (ref 0.51–0.95)
CROSSMATCH: NORMAL
DEPRECATED HCO3 PLAS-SCNC: 19 MMOL/L (ref 22–29)
EGFRCR SERPLBLD CKD-EPI 2021: >90 ML/MIN/1.73M2
ERYTHROCYTE [DISTWIDTH] IN BLOOD BY AUTOMATED COUNT: 20.7 % (ref 10–15)
FLUAV RNA SPEC QL NAA+PROBE: NEGATIVE
FLUBV RNA RESP QL NAA+PROBE: NEGATIVE
GLUCOSE SERPL-MCNC: 107 MG/DL (ref 70–99)
GLUCOSE UR STRIP-MCNC: NEGATIVE MG/DL
HCT VFR BLD AUTO: 21.9 % (ref 35–47)
HGB BLD-MCNC: 7 G/DL (ref 11.7–15.7)
HGB BLD-MCNC: 7.9 G/DL (ref 11.7–15.7)
HGB UR QL STRIP: NEGATIVE
HYALINE CASTS: 7 /LPF
INR PPP: 2.34 (ref 0.85–1.15)
INR PPP: 2.38 (ref 0.85–1.15)
ISSUE DATE AND TIME: NORMAL
KETONES UR STRIP-MCNC: NEGATIVE MG/DL
LACTATE SERPL-SCNC: 2.5 MMOL/L (ref 0.7–2)
LACTATE SERPL-SCNC: 2.6 MMOL/L (ref 0.7–2)
LACTATE SERPL-SCNC: 3.2 MMOL/L (ref 0.7–2)
LACTATE SERPL-SCNC: 3.2 MMOL/L (ref 0.7–2)
LEUKOCYTE ESTERASE UR QL STRIP: NEGATIVE
LYMPHOCYTES NFR FLD MANUAL: 38 %
MCH RBC QN AUTO: 28.2 PG (ref 26.5–33)
MCHC RBC AUTO-ENTMCNC: 32 G/DL (ref 31.5–36.5)
MCV RBC AUTO: 88 FL (ref 78–100)
MONOS+MACROS NFR FLD MANUAL: 48 %
MUCOUS THREADS #/AREA URNS LPF: PRESENT /LPF
NEUTS BAND NFR FLD MANUAL: 3 %
NITRATE UR QL: NEGATIVE
PH UR STRIP: 5.5 [PH] (ref 5–7)
PLATELET # BLD AUTO: 160 10E3/UL (ref 150–450)
POTASSIUM SERPL-SCNC: 4 MMOL/L (ref 3.4–5.3)
PROCALCITONIN SERPL IA-MCNC: 0.15 NG/ML
PROT FLD-MCNC: 1.4 G/DL
PROT SERPL-MCNC: 7 G/DL (ref 6.4–8.3)
PROTEIN BODY FLUID SOURCE: NORMAL
RBC # BLD AUTO: 2.48 10E6/UL (ref 3.8–5.2)
RBC # FLD: 1000 /UL
RBC URINE: 1 /HPF
RSV RNA SPEC NAA+PROBE: POSITIVE
SARS-COV-2 RNA RESP QL NAA+PROBE: NEGATIVE
SODIUM SERPL-SCNC: 131 MMOL/L (ref 135–145)
SP GR UR STRIP: 1.02 (ref 1–1.03)
SQUAMOUS EPITHELIAL: 2 /HPF
T4 FREE SERPL-MCNC: 1.08 NG/DL (ref 0.9–1.7)
TSH SERPL DL<=0.005 MIU/L-ACNC: 4.98 UIU/ML (ref 0.3–4.2)
UNIT ABO/RH: NORMAL
UNIT NUMBER: NORMAL
UNIT STATUS: NORMAL
UNIT TYPE ISBT: 5100
UROBILINOGEN UR STRIP-MCNC: <2 MG/DL
WBC # BLD AUTO: 16.8 10E3/UL (ref 4–11)
WBC # FLD AUTO: 40 /UL
WBC URINE: 9 /HPF

## 2023-12-05 PROCEDURE — 250N000013 HC RX MED GY IP 250 OP 250 PS 637: Performed by: INTERNAL MEDICINE

## 2023-12-05 PROCEDURE — 82042 OTHER SOURCE ALBUMIN QUAN EA: CPT | Performed by: INTERNAL MEDICINE

## 2023-12-05 PROCEDURE — 99207 PR APP CREDIT; MD BILLING SHARED VISIT: CPT | Performed by: INTERNAL MEDICINE

## 2023-12-05 PROCEDURE — 36415 COLL VENOUS BLD VENIPUNCTURE: CPT | Performed by: INTERNAL MEDICINE

## 2023-12-05 PROCEDURE — 84443 ASSAY THYROID STIM HORMONE: CPT | Performed by: INTERNAL MEDICINE

## 2023-12-05 PROCEDURE — 99223 1ST HOSP IP/OBS HIGH 75: CPT | Performed by: INTERNAL MEDICINE

## 2023-12-05 PROCEDURE — 87637 SARSCOV2&INF A&B&RSV AMP PRB: CPT | Performed by: EMERGENCY MEDICINE

## 2023-12-05 PROCEDURE — 85610 PROTHROMBIN TIME: CPT | Performed by: INTERNAL MEDICINE

## 2023-12-05 PROCEDURE — 84439 ASSAY OF FREE THYROXINE: CPT | Performed by: INTERNAL MEDICINE

## 2023-12-05 PROCEDURE — 84157 ASSAY OF PROTEIN OTHER: CPT | Performed by: INTERNAL MEDICINE

## 2023-12-05 PROCEDURE — 71045 X-RAY EXAM CHEST 1 VIEW: CPT

## 2023-12-05 PROCEDURE — 81001 URINALYSIS AUTO W/SCOPE: CPT | Performed by: EMERGENCY MEDICINE

## 2023-12-05 PROCEDURE — 258N000003 HC RX IP 258 OP 636: Performed by: FAMILY MEDICINE

## 2023-12-05 PROCEDURE — 36415 COLL VENOUS BLD VENIPUNCTURE: CPT | Performed by: EMERGENCY MEDICINE

## 2023-12-05 PROCEDURE — 258N000003 HC RX IP 258 OP 636: Performed by: INTERNAL MEDICINE

## 2023-12-05 PROCEDURE — 250N000011 HC RX IP 250 OP 636: Mod: JZ | Performed by: FAMILY MEDICINE

## 2023-12-05 PROCEDURE — 85018 HEMOGLOBIN: CPT | Performed by: INTERNAL MEDICINE

## 2023-12-05 PROCEDURE — 120N000001 HC R&B MED SURG/OB

## 2023-12-05 PROCEDURE — 85610 PROTHROMBIN TIME: CPT | Performed by: FAMILY MEDICINE

## 2023-12-05 PROCEDURE — 80053 COMPREHEN METABOLIC PANEL: CPT | Performed by: INTERNAL MEDICINE

## 2023-12-05 PROCEDURE — 83605 ASSAY OF LACTIC ACID: CPT | Performed by: EMERGENCY MEDICINE

## 2023-12-05 PROCEDURE — 85027 COMPLETE CBC AUTOMATED: CPT | Performed by: INTERNAL MEDICINE

## 2023-12-05 PROCEDURE — 272N000710 US PARACENTESIS WITH ALBUMIN

## 2023-12-05 PROCEDURE — 87077 CULTURE AEROBIC IDENTIFY: CPT | Performed by: EMERGENCY MEDICINE

## 2023-12-05 PROCEDURE — 87205 SMEAR GRAM STAIN: CPT | Performed by: EMERGENCY MEDICINE

## 2023-12-05 PROCEDURE — 83605 ASSAY OF LACTIC ACID: CPT | Performed by: INTERNAL MEDICINE

## 2023-12-05 PROCEDURE — 250N000011 HC RX IP 250 OP 636: Mod: JZ | Performed by: INTERNAL MEDICINE

## 2023-12-05 PROCEDURE — 89051 BODY FLUID CELL COUNT: CPT | Performed by: EMERGENCY MEDICINE

## 2023-12-05 PROCEDURE — 87149 DNA/RNA DIRECT PROBE: CPT | Performed by: EMERGENCY MEDICINE

## 2023-12-05 PROCEDURE — 96375 TX/PRO/DX INJ NEW DRUG ADDON: CPT

## 2023-12-05 PROCEDURE — 99418 PROLNG IP/OBS E/M EA 15 MIN: CPT | Performed by: INTERNAL MEDICINE

## 2023-12-05 PROCEDURE — P9016 RBC LEUKOCYTES REDUCED: HCPCS | Performed by: INTERNAL MEDICINE

## 2023-12-05 PROCEDURE — 87070 CULTURE OTHR SPECIMN AEROBIC: CPT | Performed by: EMERGENCY MEDICINE

## 2023-12-05 RX ORDER — NALOXONE HYDROCHLORIDE 0.4 MG/ML
0.4 INJECTION, SOLUTION INTRAMUSCULAR; INTRAVENOUS; SUBCUTANEOUS
Status: DISCONTINUED | OUTPATIENT
Start: 2023-12-05 | End: 2023-12-10 | Stop reason: HOSPADM

## 2023-12-05 RX ORDER — LIDOCAINE 40 MG/G
CREAM TOPICAL
Status: DISCONTINUED | OUTPATIENT
Start: 2023-12-05 | End: 2023-12-10 | Stop reason: HOSPADM

## 2023-12-05 RX ORDER — IBUPROFEN 600 MG/1
600 TABLET, FILM COATED ORAL EVERY 6 HOURS PRN
Status: DISCONTINUED | OUTPATIENT
Start: 2023-12-05 | End: 2023-12-08

## 2023-12-05 RX ORDER — CLONIDINE HYDROCHLORIDE 0.1 MG/1
0.1 TABLET ORAL EVERY 8 HOURS
Status: CANCELLED | OUTPATIENT
Start: 2023-12-05

## 2023-12-05 RX ORDER — KETOROLAC TROMETHAMINE 15 MG/ML
15 INJECTION, SOLUTION INTRAMUSCULAR; INTRAVENOUS ONCE
Status: COMPLETED | OUTPATIENT
Start: 2023-12-05 | End: 2023-12-05

## 2023-12-05 RX ORDER — OLANZAPINE 5 MG/1
5-10 TABLET, ORALLY DISINTEGRATING ORAL EVERY 6 HOURS PRN
Status: CANCELLED | OUTPATIENT
Start: 2023-12-05

## 2023-12-05 RX ORDER — NALOXONE HYDROCHLORIDE 0.4 MG/ML
0.2 INJECTION, SOLUTION INTRAMUSCULAR; INTRAVENOUS; SUBCUTANEOUS
Status: DISCONTINUED | OUTPATIENT
Start: 2023-12-05 | End: 2023-12-10 | Stop reason: HOSPADM

## 2023-12-05 RX ORDER — SPIRONOLACTONE 25 MG/1
100 TABLET ORAL DAILY
Status: DISCONTINUED | OUTPATIENT
Start: 2023-12-05 | End: 2023-12-10 | Stop reason: HOSPADM

## 2023-12-05 RX ORDER — LORAZEPAM 2 MG/ML
0.5 INJECTION INTRAMUSCULAR ONCE
Status: COMPLETED | OUTPATIENT
Start: 2023-12-05 | End: 2023-12-05

## 2023-12-05 RX ORDER — AMOXICILLIN 250 MG
2 CAPSULE ORAL 2 TIMES DAILY PRN
Status: DISCONTINUED | OUTPATIENT
Start: 2023-12-05 | End: 2023-12-10 | Stop reason: HOSPADM

## 2023-12-05 RX ORDER — OXYCODONE HYDROCHLORIDE 5 MG/1
5 TABLET ORAL EVERY 4 HOURS PRN
Status: DISCONTINUED | OUTPATIENT
Start: 2023-12-05 | End: 2023-12-10 | Stop reason: HOSPADM

## 2023-12-05 RX ORDER — FUROSEMIDE 10 MG/ML
20 INJECTION INTRAMUSCULAR; INTRAVENOUS ONCE
Qty: 2 ML | Refills: 0 | Status: COMPLETED | OUTPATIENT
Start: 2023-12-05 | End: 2023-12-05

## 2023-12-05 RX ORDER — PIPERACILLIN SODIUM, TAZOBACTAM SODIUM 3; .375 G/15ML; G/15ML
3.38 INJECTION, POWDER, LYOPHILIZED, FOR SOLUTION INTRAVENOUS ONCE
Status: COMPLETED | OUTPATIENT
Start: 2023-12-05 | End: 2023-12-05

## 2023-12-05 RX ORDER — ACETAMINOPHEN 650 MG/1
650 SUPPOSITORY RECTAL EVERY 4 HOURS PRN
Status: DISCONTINUED | OUTPATIENT
Start: 2023-12-05 | End: 2023-12-05

## 2023-12-05 RX ORDER — AMOXICILLIN 250 MG
1 CAPSULE ORAL 2 TIMES DAILY PRN
Status: DISCONTINUED | OUTPATIENT
Start: 2023-12-05 | End: 2023-12-10 | Stop reason: HOSPADM

## 2023-12-05 RX ORDER — HALOPERIDOL 5 MG/ML
2.5-5 INJECTION INTRAMUSCULAR EVERY 6 HOURS PRN
Status: CANCELLED | OUTPATIENT
Start: 2023-12-05

## 2023-12-05 RX ORDER — ONDANSETRON 4 MG/1
4 TABLET, ORALLY DISINTEGRATING ORAL EVERY 6 HOURS PRN
Status: DISCONTINUED | OUTPATIENT
Start: 2023-12-05 | End: 2023-12-10 | Stop reason: HOSPADM

## 2023-12-05 RX ORDER — GUAIFENESIN/DEXTROMETHORPHAN 100-10MG/5
10 SYRUP ORAL EVERY 4 HOURS PRN
Status: DISCONTINUED | OUTPATIENT
Start: 2023-12-05 | End: 2023-12-05

## 2023-12-05 RX ORDER — CEFAZOLIN SODIUM 1 G/50ML
2000 SOLUTION INTRAVENOUS ONCE
Status: COMPLETED | OUTPATIENT
Start: 2023-12-05 | End: 2023-12-05

## 2023-12-05 RX ORDER — FLUMAZENIL 0.1 MG/ML
0.2 INJECTION, SOLUTION INTRAVENOUS
Status: CANCELLED | OUTPATIENT
Start: 2023-12-05

## 2023-12-05 RX ORDER — CEFTRIAXONE 1 G/1
1 INJECTION, POWDER, FOR SOLUTION INTRAMUSCULAR; INTRAVENOUS EVERY 24 HOURS
Status: DISCONTINUED | OUTPATIENT
Start: 2023-12-05 | End: 2023-12-05

## 2023-12-05 RX ORDER — ONDANSETRON 2 MG/ML
4 INJECTION INTRAMUSCULAR; INTRAVENOUS EVERY 6 HOURS PRN
Status: DISCONTINUED | OUTPATIENT
Start: 2023-12-05 | End: 2023-12-10 | Stop reason: HOSPADM

## 2023-12-05 RX ORDER — ACETAMINOPHEN 325 MG/1
650 TABLET ORAL EVERY 8 HOURS PRN
Status: DISCONTINUED | OUTPATIENT
Start: 2023-12-05 | End: 2023-12-05

## 2023-12-05 RX ORDER — CALCIUM CARBONATE 500 MG/1
1000 TABLET, CHEWABLE ORAL 4 TIMES DAILY PRN
Status: DISCONTINUED | OUTPATIENT
Start: 2023-12-05 | End: 2023-12-10 | Stop reason: HOSPADM

## 2023-12-05 RX ORDER — FUROSEMIDE 10 MG/ML
20 INJECTION INTRAMUSCULAR; INTRAVENOUS EVERY 6 HOURS
Qty: 6 ML | Refills: 0 | Status: COMPLETED | OUTPATIENT
Start: 2023-12-05 | End: 2023-12-06

## 2023-12-05 RX ORDER — CEFTRIAXONE 1 G/1
1 INJECTION, POWDER, FOR SOLUTION INTRAMUSCULAR; INTRAVENOUS EVERY 24 HOURS
Status: DISCONTINUED | OUTPATIENT
Start: 2023-12-05 | End: 2023-12-08

## 2023-12-05 RX ORDER — SPIRONOLACTONE 100 MG/1
100 TABLET, FILM COATED ORAL DAILY
Status: ON HOLD | COMMUNITY
End: 2023-12-14

## 2023-12-05 RX ORDER — CEFAZOLIN SODIUM 1 G/50ML
1250 SOLUTION INTRAVENOUS EVERY 12 HOURS
Status: DISCONTINUED | OUTPATIENT
Start: 2023-12-05 | End: 2023-12-05

## 2023-12-05 RX ADMIN — LORAZEPAM 0.5 MG: 2 INJECTION INTRAMUSCULAR; INTRAVENOUS at 02:01

## 2023-12-05 RX ADMIN — KETOROLAC TROMETHAMINE 15 MG: 15 INJECTION, SOLUTION INTRAMUSCULAR; INTRAVENOUS at 01:15

## 2023-12-05 RX ADMIN — CEFTRIAXONE SODIUM 1 G: 1 INJECTION, POWDER, FOR SOLUTION INTRAMUSCULAR; INTRAVENOUS at 09:54

## 2023-12-05 RX ADMIN — PIPERACILLIN AND TAZOBACTAM 3.38 G: 3; .375 INJECTION, POWDER, FOR SOLUTION INTRAVENOUS at 02:03

## 2023-12-05 RX ADMIN — FUROSEMIDE 20 MG: 10 INJECTION, SOLUTION INTRAMUSCULAR; INTRAVENOUS at 09:51

## 2023-12-05 RX ADMIN — SODIUM CHLORIDE 500 ML: 9 INJECTION, SOLUTION INTRAVENOUS at 02:00

## 2023-12-05 RX ADMIN — FUROSEMIDE 20 MG: 10 INJECTION, SOLUTION INTRAMUSCULAR; INTRAVENOUS at 17:05

## 2023-12-05 RX ADMIN — SPIRONOLACTONE 100 MG: 25 TABLET, FILM COATED ORAL at 17:05

## 2023-12-05 RX ADMIN — VANCOMYCIN HYDROCHLORIDE 2000 MG: 5 INJECTION, POWDER, LYOPHILIZED, FOR SOLUTION INTRAVENOUS at 02:43

## 2023-12-05 RX ADMIN — OXYCODONE HYDROCHLORIDE 5 MG: 5 TABLET ORAL at 21:41

## 2023-12-05 RX ADMIN — OXYCODONE HYDROCHLORIDE 5 MG: 5 TABLET ORAL at 11:45

## 2023-12-05 RX ADMIN — FUROSEMIDE 20 MG: 10 INJECTION, SOLUTION INTRAMUSCULAR; INTRAVENOUS at 21:40

## 2023-12-05 RX ADMIN — VANCOMYCIN HYDROCHLORIDE 1500 MG: 5 INJECTION, POWDER, LYOPHILIZED, FOR SOLUTION INTRAVENOUS at 11:25

## 2023-12-05 ASSESSMENT — ACTIVITIES OF DAILY LIVING (ADL)
ADLS_ACUITY_SCORE: 35
ADLS_ACUITY_SCORE: 20
ADLS_ACUITY_SCORE: 35
ADLS_ACUITY_SCORE: 20
ADLS_ACUITY_SCORE: 35

## 2023-12-05 NOTE — PROVIDER NOTIFICATION
Provider (Dr. Iyer) notified of return of lab results. Repeat lactic is 3.2 unchanged from previous lactic. Hgb is 7 up from the prior 5.2.    Awaiting further orders.

## 2023-12-05 NOTE — H&P
[facility]    History and Physical - Hospitalist Service       Date of Admission:  12/4/2023    Assessment & Plan      Priya Castano is a 28 year old female admitted on 12/4/2023. She presented to the ER with complaints of abdominal pain and distention, and is status post paracentesis with drainage of 3.5 L of ascitic fluid.  She is admitted with concern for sepsis, rsv positive.    Assessment and plan    Acute anemia likely blood loss versus chronic disease  --Agree with PRBC transfusion, continue PRBC transfusion.  --Will hemoglobin is 7  -- Send stool for occult blood      Abdominal and distention  Decompensated cirrhosis in the setting of alcoholic cirrhosis  --Patient is status post a paracentesis in the ER with removal of 3.5 L of ascites fluid.  Patient endorses she usually has 6 L removed and still feels her abdomen is distended and bloated  --IR service consulted for repeat paracentesis, with albumin  --Supportive management for underlying pain      Hyperbilirubinemia in the setting of alcoholic cirrhosis  --Trend serum bilirubin      Presumed SBP  SIRS criteria met/sepsis source unknown  --Will continue with empiric ceftriaxone and vancomycin    Elevated INR the setting of liver disease  --No bleeding, no indication for vitamin K  --Trend INR      RSV positive  --Supportive management, monitor respiratory status, supplemental oxygen as needed,  --    Leukocytosis  --We will trend for now      Hyponatremia  --Monitor serum sodium  --IV fluids    Metabolic acidosis  -- Trend serum bicarb with IV fluids    Full code  DVT prophylaxis Lovenox--stool occult blood pending          Diet: Regular Diet Adult  Combination Diet Regular Diet Adult    DVT Prophylaxis:  INR elevated  Short Catheter: Not present  Lines: None     Cardiac Monitoring: ACTIVE order. Indication: sepsis  Code Status: Full Code    Clinically Significant Risk Factors Present on Admission              # Hypoalbuminemia:  "Lowest albumin = 2.4 g/dL at 12/4/2023  6:06 PM, will monitor as appropriate    # Coagulation Defect: INR = 2.34 (Ref range: 0.85 - 1.15) and/or PTT = N/A, will monitor for bleeding         # Obesity: Estimated body mass index is 37.47 kg/m  as calculated from the following:    Height as of this encounter: 1.588 m (5' 2.5\").    Weight as of this encounter: 94.4 kg (208 lb 3.2 oz).              Disposition Plan      Expected Discharge Date: 12/07/2023                  Kiko Iyer MD  Hospitalist Service  [facility]  Securely message with [employer] (more info)  Text page via [facility] Paging/Directory     ______________________________________________________________________    Chief Complaint   Abdominal pain and distention      History is obtained from the patient    History of Present Illness   Priya Castano is a 28 year old female who presented to the ER with complaints of abdominal pain and distention.  Underlying alcoholic cirrhosis and had required paracentesis in the past.  Abdominal pain was described as pain generalized and nonradiating.  She described the pain as being associated with the distention  Associated possible chills  Denies any fever nausea vomiting diarrhea.  Status post paracentesis in the ER with drainage of 3.5 L.  While in the ER she had become febrile while receiving a blood transfusion  .  RSV returned positive.  She still remains tachycardia with a mildly elevated white count.  Hospitalist is consulted for admission secondary to concern for sepsis source is unknown.      Past Medical History    Past Medical History:   Diagnosis Date    Alcoholic cirrhosis of liver with ascites (H)     Hypothyroidism        Past Surgical History   Past Surgical History:   Procedure Laterality Date    IR PARACENTESIS  7/28/2023       Prior to Admission Medications   Prior to Admission Medications   Prescriptions Last Dose Informant Patient Reported? Taking?   furosemide (LASIX) 20 " MG tablet   No No   Sig: Take 2 tablets (40 mg) by mouth daily   furosemide (LASIX) 40 MG tablet   No No   Sig: Take 1 tablet (40 mg) by mouth 2 times daily   spironolactone (ALDACTONE) 100 MG tablet   No No   Sig: Take 1 tablet (100 mg) by mouth 2 times daily      Facility-Administered Medications: None        Social History   I have reviewed this patient's social history and updated it with pertinent information if needed.  Social History     Tobacco Use    Smoking status: Never    Smokeless tobacco: Never   Substance Use Topics    Alcohol use: Yes     Comment: 1 bottle wine daily    Drug use: No         Family History   I have reviewed this patient's family history and updated it with pertinent information if needed.  Family History   Problem Relation Age of Onset    Thyroid Disease Mother          Allergies   Allergies   Allergen Reactions    Bee Venom Hives and Shortness Of Breath        Physical Exam   Vital Signs: Temp: 100.4  F (38  C) Temp src: Oral BP: 117/57 Pulse: (!) 128   Resp: 16 SpO2: 95 % O2 Device: None (Room air)    Weight: 208 lbs 3.2 oz      General: Awake and alert,.  Eyes: Pupils reactive to light  HENT: Atraumatic, oral mucosa moist  Neck: No masses, or swelling  Pulmonary: Good air entry, clear to auscultation  CVS: Heart sounds 1 and 2 present, regular rhythm, rate, no murmur  GI/ Abdomen: Soft , not tender , distended, bowel sounds ++  : No dennis   MELITA: Normal inspection, no muscle spasm  Skin: No rash, skin intact  Extremities: Edema ++  Neuro: Alert and oriented, follows command, speech normal, no focal weakness  Psych: Normal mood and affect      Medical Decision Making       75 MINUTES SPENT BY ME on the date of service doing chart review, history, exam, documentation & further activities per the note.      Data     I have personally reviewed the following data over the past 24 hrs:    16.2 (H)  \   5.5 (LL)   / 192     132 (L) 101 5.6 (L) /  107 (H)   3.7 18 (L) 0.67 \     ALT: 34  AST: 155 (H) AP: 244 (H) TBILI: 3.6 (H)   ALB: 2.4 (L) TOT PROTEIN: 7.2 LIPASE: N/A     Procal: 0.15 CRP: N/A Lactic Acid: 3.2 (H)       INR:  2.34 (H) PTT:  N/A   D-dimer:  N/A Fibrinogen:  N/A       Imaging results reviewed over the past 24 hrs:   Recent Results (from the past 24 hour(s))   XR Chest Port 1 View    Narrative    EXAM: XR CHEST PORT 1 VIEW  LOCATION: [facility]  DATE: 12/5/2023    INDICATION: Dyspnea, fever  COMPARISON: CTA chest 07/28/2023.      Impression    IMPRESSION: Low lung volumes but the lungs appear clear. Normal heart size and pulmonary vascularity. No pleural fluid or pneumothorax.

## 2023-12-05 NOTE — PHARMACY-VANCOMYCIN DOSING SERVICE
Pharmacy Vancomycin Initial Note  Date of Service 2023  Patient's  1995  28 year old, female    Indication: Sepsis    Current estimated CrCl = Estimated Creatinine Clearance: 135.2 mL/min (based on SCr of 0.67 mg/dL).    Creatinine for last 3 days  2023:  6:06 PM Creatinine 0.67 mg/dL    Recent Vancomycin Level(s) for last 3 days  No results found for requested labs within last 3 days.      Vancomycin IV Administrations (past 72 hours)                     vancomycin (VANCOCIN) 2,000 mg in sodium chloride 0.9 % 500 mL intermittent infusion (mg) 2,000 mg New Bag 23 0243                    Nephrotoxins and other renal medications (From now, onward)      Start     Dose/Rate Route Frequency Ordered Stop    23 0200  vancomycin (VANCOCIN) 2,000 mg in sodium chloride 0.9 % 500 mL intermittent infusion         2,000 mg  over 2 Hours Intravenous ONCE 23 0136              Contrast Orders - past 72 hours (72h ago, onward)      None            Insight[employer] Prediction of Planned Initial Vancomycin Regimen  Loading dose: 2000 mg on 23 at 02:43  Regimen: 1250 mg IV every 12 hours.  Start time: 11:00 on 2023  Exposure target: AUC24 (range)400-600 mg/L.hr   AUC24,ss: 462 mg/L.hr  Probability of AUC24 > 400: 65 %  Ctrough,ss: 13.9 mg/L  Probability of Ctrough,ss > 20: 23 %  Probability of nephrotoxicity (Lodise BEBA ): 9 %          Plan:  Start vancomycin  1250 mg IV q12h. This regimen is timed to start at 11:00, ~8 hours after the loading dose. This timing will minimize subtherapeutic intervals.  Vancomycin monitoring method: AUC  Vancomycin therapeutic monitoring goal: 400-600 mg*h/L  Pharmacy will check vancomycin levels as appropriate in 1-3 Days.    Serum creatinine levels will be ordered daily for the first week of therapy and at least twice weekly for subsequent weeks.      Sara Reece [facility]

## 2023-12-05 NOTE — PLAN OF CARE
Transferring to room 105. Report called to Yasmine CAMACHO @[phone].  All belongings sent including cell phone.  Will transport by cart. Emerald Smith RN

## 2023-12-05 NOTE — ED NOTES
EMERGENCY DEPARTMENT SIGN OUT NOTE        ED COURSE AND MEDICAL DECISION MAKING  1:02 AM Patient was signed out to me by Dr Nadeem Obregon.  In brief, Priya Castano is a 28 year old female who initially presented with abdominal distention and requesting paracentesis.  This was performed with about 3 L of fluid obtained which was described as clear yellow not cloudy.  However, labs today demonstrate anemia which is not new for the patient but is significant enough that transfusion has been ordered, also developed a fever while in the department.  Labs independently interpreted by me with leukocytosis which has been seen previously for the patient, mild elevation in alkaline phosphatase and AST with mild elevation in bilirubin, again all of which have been seen for the patient previously, basic panel generally reassuring.  Chest x-ray independently interpreted by me negative for acute findings.  While in the department, has developed a fever which could be transfusion reaction but cannot exclude infectious etiology.  Lactate and labs ordered.  1:19 AM lactate elevated at 3.2.  Given constellation of fever, tachycardia, leukocytosis, elevated lactate, concern for sepsis and so antibiotics are ordered.  Review of chart shows that leukocytosis is not new for the patient, she also does tend to have some tachycardia although usually 100-110 so current rate is abnormal.  May be related to acute fever.  Fever itself could be from infectious etiology but cannot exclude transfusion reaction.  1:30 AM patient rechecked and updated.  We discussed laboratory findings and vital signs, concern for possible infection or sepsis.  Patient herself and has no complaints at this time and is feeling about the same as she usually does.  2:01 AM  Care discussed with Dr. Iyer, hospitalist for admission.  2:25 AM Ascitic fluid with 40 total nucleated cells, not consistent with SBP.  Patient remains tachycardic but otherwise vitally  stable.    Critical Care     Performed by: Dr Moody Forrest  Total critical care time: 70 minutes  Critical care was necessary to treat or prevent imminent or life-threatening deterioration of the following conditions: Hemoglobin 5.5 with blood transfusion, sepsis with multiple antibiotics and admission  Critical care was time spent personally by me on the following activities: development of treatment plan with patient or surrogate, discussions with consultants, examination of patient, evaluation of patient's response to treatment, obtaining history from patient or surrogate, ordering and performing treatments and interventions, ordering and review of laboratory studies, ordering and review of radiographic studies, re-evaluation of patient's condition and monitoring for potential decompensation.  Critical care time was exclusive of separately billable procedures and treating other patients.      FINAL IMPRESSION    1. Anemia, unspecified type    2. Alcoholic cirrhosis of liver with ascites (H)    3. SIRS (systemic inflammatory response syndrome) (H)        ED MEDS  Medications   piperacillin-tazobactam (ZOSYN) 3.375 g vial to attach to  mL bag (3.375 g Intravenous $New Bag 12/5/23 0203)   vancomycin (VANCOCIN) 2,000 mg in sodium chloride 0.9 % 500 mL intermittent infusion (has no administration in time range)   oxyCODONE (ROXICODONE) tablet 5 mg (5 mg Oral $Given 12/4/23 1814)   ondansetron (ZOFRAN) injection 4 mg (4 mg Intravenous $Given 12/4/23 1814)   LORazepam (ATIVAN) tablet 0.5 mg (0.5 mg Oral $Given 12/4/23 2302)   ketorolac (TORADOL) injection 15 mg (15 mg Intravenous $Given 12/5/23 0115)   sodium chloride 0.9% BOLUS 500 mL (500 mLs Intravenous $New Bag 12/5/23 0200)   LORazepam (ATIVAN) injection 0.5 mg (0.5 mg Intravenous $Given 12/5/23 0201)       LAB  Labs Ordered and Resulted from Time of ED Arrival to Time of ED Departure   COMPREHENSIVE METABOLIC PANEL - Abnormal       Result Value    Sodium  132 (*)     Potassium 3.7      Carbon Dioxide (CO2) 18 (*)     Anion Gap 13      Urea Nitrogen 5.6 (*)     Creatinine 0.67      GFR Estimate >90      Calcium 7.4 (*)     Chloride 101      Glucose 107 (*)     Alkaline Phosphatase 244 (*)      (*)     ALT 34      Protein Total 7.2      Albumin 2.4 (*)     Bilirubin Total 3.6 (*)    CBC WITH PLATELETS AND DIFFERENTIAL - Abnormal    WBC Count 16.2 (*)     RBC Count 2.01 (*)     Hemoglobin 5.5 (*)     Hematocrit 18.0 (*)     MCV 90      MCH 27.4      MCHC 30.6 (*)     RDW 23.9 (*)     Platelet Count 192      % Neutrophils 76      % Lymphocytes 13      % Monocytes 7      % Eosinophils 0      % Basophils 0      % Immature Granulocytes 4      NRBCs per 100 WBC 0      Absolute Neutrophils 12.4 (*)     Absolute Lymphocytes 2.0      Absolute Monocytes 1.1      Absolute Eosinophils 0.1      Absolute Basophils 0.1      Absolute Immature Granulocytes 0.7 (*)     Absolute NRBCs 0.0     LACTIC ACID WHOLE BLOOD - Abnormal    Lactic Acid 3.2 (*)    INFLUENZA A/B, RSV, & SARS-COV2 PCR - Abnormal    Influenza A PCR Negative      Influenza B PCR Negative      RSV PCR Positive (*)     SARS CoV2 PCR Negative     CELL COUNT BODY FLUID - Abnormal    Color Yellow      Clarity Hazy (*)     RBC Count 1,000      Cell Count Fluid Source Abdomen      Total Nucleated Cells 40     INR - Abnormal    INR 2.34 (*)    PROCALCITONIN - Normal    Procalcitonin 0.15     ROUTINE UA WITH MICROSCOPIC REFLEX TO CULTURE   DIFERENTIAL BODY FLUID   LACTIC ACID WHOLE BLOOD   TYPE AND SCREEN, ADULT    ABO/RH(D) O POS      Antibody Screen Negative      SPECIMEN EXPIRATION DATE [id number]     PREPARE RED BLOOD CELLS (UNIT)    Blood Component Type Red Blood Cells      Product Code V2084Q83      Unit Status Transfused      Unit Number [id number]      CROSSMATCH Compatible      CODING SYSTEM [id number]      ISSUE DATE AND TIME 20231204205100      UNIT ABO/RH O+      UNIT TYPE ISBT 5100     PREPARE RED BLOOD  CELLS (UNIT)    Blood Component Type Red Blood Cells      Product Code [id number]      Unit Status Transfused      Unit Number [id number]      CROSSMATCH Compatible      CODING SYSTEM [id number]      ISSUE DATE AND TIME [id number]      UNIT ABO/RH O+      UNIT TYPE ISBT 5100     PREPARE RED BLOOD CELLS (UNIT)   BLOOD CULTURE   BLOOD CULTURE   AEROBIC BACTERIAL CULTURE ROUTINE   TRANSFUSE RED BLOOD CELLS (UNIT)   TRANSFUSE RED BLOOD CELLS (UNIT)   ABO/RH TYPE AND SCREEN   CELL COUNT WITH DIFFERENTIAL FLUID     RADIOLOGY    XR Chest Port 1 View   Final Result   IMPRESSION: Low lung volumes but the lungs appear clear. Normal heart size and pulmonary vascularity. No pleural fluid or pneumothorax.          DISCHARGE MEDS  New Prescriptions    No medications on file       Moody Forrest MD  [facility] EMERGENCY DEPARTMENT  [facility] [facility] [id number] [phone]       Moody Forrest MD  12/05/23 0226

## 2023-12-05 NOTE — PROVIDER NOTIFICATION
When starting unit of PRBC's, LS were noted to be diminished with expiratory wheezing throughout and fine crackles in the bilateral upper lobes. O2 sats remain mid nineties on room air and patient states she does not currently feel short of breath. This is changed from previous respiratory assessment at 0200. (See flowsheets)    Provider, (Dr. Iyer) notified of respiratory changes.

## 2023-12-05 NOTE — PROGRESS NOTES
[facility]    Medicine Progress Note - Hospitalist Service    Date of Admission:  12/4/2023    Assessment & Plan      Priya Castano is a 28 year old female admitted on 12/4/2023. She presented to the ER with complaints of abdominal pain and distention, and is status post paracentesis with drainage of 3.5 L of ascitic fluid.  She is admitted with concern for sepsis, rsv positive.      Sepsis secondary to RSV and possibly SBP  -RSV positive on admission  -Ascites fluid cultures 12/5: NGTD  -Continue empiric coverage for SBP until cultures resolved, vancomycin and Rocephin  -Supportive cares for RSV     Likely worsening of anemia of chronic disease requiring transfusion  -rule out acute anemia  -Has had severe anemia related to cirrhosis in the recent past, received a unit of PRBCs earlier this month through transplant physician on 11/7  -received 1 units pRBC 12/4, 1 unit pRBCs 12/5  -Will monitor and replace for hemoglobin less than 7  -Fecal occult pending    Abdominal and distention secondary to recurrent ascites in the setting of decompensated cirrhosis  -ED removed 3.5 L and sent for testing  -IR able to remove 10L  -IR service consulted for repeat paracentesis, with albumin  -Supportive management for underlying pain  -Continue PTA spironolactone 100 mg daily  -Furosemide 20 mg every 6 hours for an additional 3 doses today, having good urine output  -Will monitor renal function  -Direct intake and output    Alcohol related cirrhosis  -Follows with Dr. Vanessa Peguero hepatology/liver transplant team outpatient, planning to get worked up for liver transplant  -Was drinking intermittently until about May to June of 2023 when her drinking became daily, about 1 bottle of wine a day, given age and drinking history is being worked up for additional causes of cirrhosis  -Has been sober since 7/27/2023    Acquired hypothyroidism due to Hashimoto's thyroiditis  -Has been on thyroid replacement in  "the past  -12/5: TSH 4.98, fT4 pending    Hyperbilirubinemia in the setting of alcoholic cirrhosis  -Trend serum bilirubin    Elevated INR the setting of liver disease  -No bleeding, no indication for vitamin K  -Trend INR    Hyponatremia due to hypervolemia  -Monitor serum sodium  -IV fluids    Metabolic acidosis  - Trend serum bicarb with IV fluids        Diet: Combination Diet Regular Diet Adult    DVT Prophylaxis: Pneumatic Compression Devices  Short Catheter: Not present  Lines: None     Cardiac Monitoring: ACTIVE order. Indication: sepsis  Code Status: Full Code      Clinically Significant Risk Factors Present on Admission              # Hypoalbuminemia: Lowest albumin = 2.3 g/dL at 12/5/2023  5:17 AM, will monitor as appropriate  # Coagulation Defect: INR = 2.38 (Ref range: 0.85 - 1.15) and/or PTT = N/A, will monitor for bleeding         # Obesity: Estimated body mass index is 37.47 kg/m  as calculated from the following:    Height as of this encounter: 1.588 m (5' 2.5\").    Weight as of this encounter: 94.4 kg (208 lb 3.2 oz).              Disposition Plan      Expected Discharge Date: 12/07/2023                    Ying Chun MD  Hospitalist Service  [facility]  Securely message with [employer] (more info)  Text page via [employer] Paging/Directory   ______________________________________________________________________    Interval History   Ms. Castano is feeling better today.  She is having tachycardia but otherwise is doing well.  Had 13 L total removed so far but with receiving blood have not had any issues with blood pressure.  Her sepsis symptoms are likely related to RSV but will pend cultures of ascites fluid.  Discussed with her her drinking history which is not as significant as expected for someone with her level of cirrhosis.  She had gotten up to drinking 1 bottle of wine a day but had only been doing that for short amount of time approximately 1 month prior to failure.  " She is seeing transplant outpatient at the [facility] for this.  She is hoping to get on the transplant list at some time.  She has been sober since July 27, 2023 so is almost at 6 months of sobriety.  She states that she has follow-up with transplant soon.     Physical Exam   Vital Signs: Temp: 98.4  F (36.9  C) Temp src: Oral BP: (!) 144/63 Pulse: (!) 125   Resp: 22 SpO2: 98 % O2 Device: None (Room air)    Weight: 208 lbs 3.2 oz    General Appearance: Awake, alert, in no acute distress  Respiratory: CTAB, no wheeze  Cardiovascular: Tachycardia  GI: soft, nontender, distended, normal bowel sounds  Skin: no jaundice, no rash      Medical Decision Making       50 MINUTES SPENT BY ME on the date of service doing chart review, history, exam, documentation & further activities per the note.      Data     I have personally reviewed the following data over the past 24 hrs:    16.8 (H)  \   7.9 (L)   / 160     131 (L) 101 5.5 (L) /  107 (H)   4.0 19 (L) 0.80 \     ALT: 34 AST: 153 (H) AP: 238 (H) TBILI: 3.7 (H)   ALB: 2.3 (L) TOT PROTEIN: 7.0 LIPASE: N/A     TSH: 4.98 (H) T4: N/A A1C: N/A     Procal: 0.15 CRP: N/A Lactic Acid: 2.6 (H)       INR:  2.38 (H) PTT:  N/A   D-dimer:  N/A Fibrinogen:  N/A       Imaging results reviewed over the past 24 hrs:   Recent Results (from the past 24 hour(s))   XR Chest Port 1 View    Narrative    EXAM: XR CHEST PORT 1 VIEW  LOCATION: [facility]  DATE: 12/5/2023    INDICATION: Dyspnea, fever  COMPARISON: CTA chest 07/28/2023.      Impression    IMPRESSION: Low lung volumes but the lungs appear clear. Normal heart size and pulmonary vascularity. No pleural fluid or pneumothorax.   US Paracentesis with Albumin    Narrative    EXAM:  1. PARACENTESIS  2. ULTRASOUND GUIDANCE  LOCATION: [facility]  DATE: 12/5/2023    INDICATION: Abdominal pain and distention. Decompensated cirrhosis.    PROCEDURE: Informed consent obtained. Time out performed. A  limited ultrasound abdomen is performed demonstrate large amount of ascites. The largest collection of fluid was localized to the left midabdomen and the overlying skin was prepped and draped in   a sterile fashion. 10 mL of 1% lidocaine was infused into local soft tissues. A 5 [place] catheter system was introduced into the abdominal ascites under ultrasound guidance.    10 liters of clear fluid were removed.    Patient tolerated procedure well.    Ultrasound imaging was obtained and placed in the patient's permanent medical record.      Impression    IMPRESSION:  1.  Ultrasound-guided therapeutic paracentesis performed without complication.    Reference CPT Code: 39148

## 2023-12-05 NOTE — MEDICATION SCRIBE - ADMISSION MEDICATION HISTORY
Medication Scribe Admission Medication History    Admission medication history is complete. The information provided in this note is only as accurate as the sources available at the time of the update.    Information Source(s): Patient, Hospital records, and CareEverywhere/SureScripts via in-person    Pertinent Information: Lasix 40 MG  tabs are being held because pt hasn't started yet.    Changes made to PTA medication list:  Added: Spironolactone 100 MG (deleted/reentered) frequency changed to every morning instead of twice daily.  Deleted: None  Changed: None    Medication Affordability:  Not including over the counter (OTC) medications, was there a time in the past 3 months when you did not take your medications as prescribed because of cost?: No    Allergies reviewed with patient and updates made in EHR: yes    Medication History Completed By: Jun Vaz 12/5/2023 3:22 AM    Prior to Admission medications    Medication Sig Last Dose Taking? Auth Provider Long Term End Date   furosemide (LASIX) 20 MG tablet Take 2 tablets (40 mg) by mouth daily 12/4/2023 at am Yes Luci Mendez MD Yes    spironolactone (ALDACTONE) 100 MG tablet Take 100 mg by mouth daily 12/4/2023 at am Yes Reported, Patient Yes

## 2023-12-05 NOTE — PLAN OF CARE
Eating and drinking well. Droplet and contact precautions in place (RSV)  up with stand by assist. IV antibiotics given as ordered. Priya has a good understanding of her plan of care as she states she has been through this before.  Oxycontin given for abdominal pain. Legs ache about the same as at home (edema)   falls and pressure ulcer prevention plans in place. Emerald Smith RN    Problem: Adult Inpatient Plan of Care  Goal: Absence of Hospital-Acquired Illness or Injury  Intervention: Prevent Infection  Recent Flowsheet Documentation  Taken 12/5/2023 1200 by Emerald Smith RN  Infection Prevention: (RSV- patient masks when in hallway) hand hygiene promoted     Problem: Adult Inpatient Plan of Care  Goal: Absence of Hospital-Acquired Illness or Injury  Intervention: Prevent Skin Injury  Recent Flowsheet Documentation  Taken 12/5/2023 1200 by Emerald Smith RN  Body Position: position changed independently     Problem: Adult Inpatient Plan of Care  Goal: Absence of Hospital-Acquired Illness or Injury  Intervention: Identify and Manage Fall Risk  Recent Flowsheet Documentation  Taken 12/5/2023 1200 by Emerald Smith RN  Safety Promotion/Fall Prevention:   nonskid shoes/slippers when out of bed   room near nurse's station

## 2023-12-06 ENCOUNTER — REFERRAL (OUTPATIENT)
Dept: TRANSPLANT | Facility: CLINIC | Age: 28
End: 2023-12-06

## 2023-12-06 DIAGNOSIS — K70.31 ALCOHOLIC CIRRHOSIS OF LIVER WITH ASCITES (H): Primary | ICD-10-CM

## 2023-12-06 LAB
ALBUMIN SERPL BCG-MCNC: 2.3 G/DL (ref 3.5–5.2)
ALP SERPL-CCNC: 235 U/L (ref 40–150)
ALT SERPL W P-5'-P-CCNC: 29 U/L (ref 0–50)
ANION GAP SERPL CALCULATED.3IONS-SCNC: 7 MMOL/L (ref 7–15)
AST SERPL W P-5'-P-CCNC: 131 U/L (ref 0–45)
BILIRUB SERPL-MCNC: 4.4 MG/DL
BUN SERPL-MCNC: 5.4 MG/DL (ref 6–20)
CALCIUM SERPL-MCNC: 7.3 MG/DL (ref 8.6–10)
CHLORIDE SERPL-SCNC: 101 MMOL/L (ref 98–107)
CREAT SERPL-MCNC: 1.11 MG/DL (ref 0.51–0.95)
DEPRECATED HCO3 PLAS-SCNC: 24 MMOL/L (ref 22–29)
EGFRCR SERPLBLD CKD-EPI 2021: 69 ML/MIN/1.73M2
ENTEROCOCCUS FAECALIS: NOT DETECTED
ENTEROCOCCUS FAECIUM: NOT DETECTED
GLUCOSE SERPL-MCNC: 80 MG/DL (ref 70–99)
HGB BLD-MCNC: 7.5 G/DL (ref 11.7–15.7)
HOLD SPECIMEN: NORMAL
LACTATE SERPL-SCNC: 1.4 MMOL/L (ref 0.7–2)
LISTERIA SPECIES (DETECTED/NOT DETECTED): NOT DETECTED
MAGNESIUM SERPL-MCNC: 1.1 MG/DL (ref 1.7–2.3)
MAGNESIUM SERPL-MCNC: 1.2 MG/DL (ref 1.7–2.3)
MAGNESIUM SERPL-MCNC: 2.2 MG/DL (ref 1.7–2.3)
PHOSPHATE SERPL-MCNC: 3.1 MG/DL (ref 2.5–4.5)
POTASSIUM SERPL-SCNC: 3 MMOL/L (ref 3.4–5.3)
POTASSIUM SERPL-SCNC: 3.2 MMOL/L (ref 3.4–5.3)
POTASSIUM SERPL-SCNC: 3.6 MMOL/L (ref 3.4–5.3)
PROT SERPL-MCNC: 6.9 G/DL (ref 6.4–8.3)
SODIUM SERPL-SCNC: 132 MMOL/L (ref 135–145)
STAPHYLOCOCCUS AUREUS: DETECTED
STAPHYLOCOCCUS EPIDERMIDIS: NOT DETECTED
STAPHYLOCOCCUS LUGDUNENSIS: NOT DETECTED
STREPTOCOCCUS AGALACTIAE: NOT DETECTED
STREPTOCOCCUS ANGINOSUS GROUP: NOT DETECTED
STREPTOCOCCUS PNEUMONIAE: NOT DETECTED
STREPTOCOCCUS PYOGENES: NOT DETECTED
STREPTOCOCCUS SPECIES: NOT DETECTED

## 2023-12-06 PROCEDURE — 250N000013 HC RX MED GY IP 250 OP 250 PS 637: Performed by: INTERNAL MEDICINE

## 2023-12-06 PROCEDURE — 36415 COLL VENOUS BLD VENIPUNCTURE: CPT | Performed by: INTERNAL MEDICINE

## 2023-12-06 PROCEDURE — 99233 SBSQ HOSP IP/OBS HIGH 50: CPT | Performed by: INTERNAL MEDICINE

## 2023-12-06 PROCEDURE — 120N000001 HC R&B MED SURG/OB

## 2023-12-06 PROCEDURE — 80053 COMPREHEN METABOLIC PANEL: CPT | Performed by: INTERNAL MEDICINE

## 2023-12-06 PROCEDURE — 250N000011 HC RX IP 250 OP 636: Performed by: INTERNAL MEDICINE

## 2023-12-06 PROCEDURE — 84100 ASSAY OF PHOSPHORUS: CPT | Performed by: INTERNAL MEDICINE

## 2023-12-06 PROCEDURE — 258N000003 HC RX IP 258 OP 636: Performed by: INTERNAL MEDICINE

## 2023-12-06 PROCEDURE — 85018 HEMOGLOBIN: CPT | Performed by: INTERNAL MEDICINE

## 2023-12-06 PROCEDURE — 83735 ASSAY OF MAGNESIUM: CPT | Performed by: INTERNAL MEDICINE

## 2023-12-06 PROCEDURE — 87040 BLOOD CULTURE FOR BACTERIA: CPT | Performed by: INTERNAL MEDICINE

## 2023-12-06 PROCEDURE — 84132 ASSAY OF SERUM POTASSIUM: CPT | Performed by: INTERNAL MEDICINE

## 2023-12-06 PROCEDURE — 84481 FREE ASSAY (FT-3): CPT | Performed by: INTERNAL MEDICINE

## 2023-12-06 PROCEDURE — 83605 ASSAY OF LACTIC ACID: CPT | Performed by: INTERNAL MEDICINE

## 2023-12-06 RX ORDER — POTASSIUM CHLORIDE 1500 MG/1
40 TABLET, EXTENDED RELEASE ORAL ONCE
Status: CANCELLED | OUTPATIENT
Start: 2023-12-06 | End: 2023-12-06

## 2023-12-06 RX ORDER — FUROSEMIDE 20 MG
40 TABLET ORAL DAILY
Status: DISCONTINUED | OUTPATIENT
Start: 2023-12-06 | End: 2023-12-10 | Stop reason: HOSPADM

## 2023-12-06 RX ORDER — LEVOTHYROXINE SODIUM 25 UG/1
25 TABLET ORAL
Status: DISCONTINUED | OUTPATIENT
Start: 2023-12-07 | End: 2023-12-07

## 2023-12-06 RX ORDER — POTASSIUM CHLORIDE 1500 MG/1
40 TABLET, EXTENDED RELEASE ORAL ONCE
Status: COMPLETED | OUTPATIENT
Start: 2023-12-06 | End: 2023-12-06

## 2023-12-06 RX ORDER — MAGNESIUM SULFATE 4 G/50ML
4 INJECTION INTRAVENOUS ONCE
Status: COMPLETED | OUTPATIENT
Start: 2023-12-06 | End: 2023-12-06

## 2023-12-06 RX ADMIN — VANCOMYCIN HYDROCHLORIDE 1500 MG: 5 INJECTION, POWDER, LYOPHILIZED, FOR SOLUTION INTRAVENOUS at 00:35

## 2023-12-06 RX ADMIN — VANCOMYCIN HYDROCHLORIDE 1500 MG: 5 INJECTION, POWDER, LYOPHILIZED, FOR SOLUTION INTRAVENOUS at 11:07

## 2023-12-06 RX ADMIN — FUROSEMIDE 40 MG: 20 TABLET ORAL at 12:50

## 2023-12-06 RX ADMIN — OXYCODONE HYDROCHLORIDE 5 MG: 5 TABLET ORAL at 21:04

## 2023-12-06 RX ADMIN — POTASSIUM CHLORIDE 40 MEQ: 1500 TABLET, EXTENDED RELEASE ORAL at 16:33

## 2023-12-06 RX ADMIN — SPIRONOLACTONE 100 MG: 25 TABLET, FILM COATED ORAL at 08:19

## 2023-12-06 RX ADMIN — VANCOMYCIN HYDROCHLORIDE 1500 MG: 5 INJECTION, POWDER, LYOPHILIZED, FOR SOLUTION INTRAVENOUS at 23:49

## 2023-12-06 RX ADMIN — CEFTRIAXONE SODIUM 1 G: 1 INJECTION, POWDER, FOR SOLUTION INTRAMUSCULAR; INTRAVENOUS at 08:19

## 2023-12-06 RX ADMIN — OXYCODONE HYDROCHLORIDE 5 MG: 5 TABLET ORAL at 08:38

## 2023-12-06 RX ADMIN — MAGNESIUM SULFATE HEPTAHYDRATE 4 G: 80 INJECTION, SOLUTION INTRAVENOUS at 16:33

## 2023-12-06 RX ADMIN — FUROSEMIDE 20 MG: 10 INJECTION, SOLUTION INTRAMUSCULAR; INTRAVENOUS at 04:12

## 2023-12-06 ASSESSMENT — ACTIVITIES OF DAILY LIVING (ADL)
ADLS_ACUITY_SCORE: 20

## 2023-12-06 NOTE — PLAN OF CARE
Goal Outcome Evaluation:    Patient alert and oriented x4. On tele and is tachy. Highest temp was 100.0. Abdomen is distended with pain reported a 7/10, prn oxy effective. Lasix given, strict I/O. Sepsis protocol continues to trigger, MD updated and lactic will be drawn in the AM. Cough present lungs sounds with expiratory wheeze to bases, RT recommended flutter valve.

## 2023-12-06 NOTE — PROGRESS NOTES
[facility]    Medicine Progress Note - Hospitalist Service    Date of Admission:  12/4/2023    Assessment & Plan   Priya Castano is a 28 year old female admitted on 12/4/2023. She presented to the ER with complaints of abdominal pain and distention, and is status post paracentesis with drainage of 3.5 L of ascitic fluid.  She is admitted with concern for sepsis, rsv positive.    Sepsis secondary to RSV and possibly SBP  -RSV positive on admission  -Ascites fluid cultures 12/5: NGTD  -Blood culture 12/5: staph aureus, possible contaminant  -Blood culture 12/6: pending  -Continue empiric coverage for SBP until cultures result, vancomycin and Rocephin  -Supportive cares for RSV     Anemia of chronic disease requiring transfusion  -rule out acute anemia  -Has had severe anemia related to cirrhosis in the recent past, received a unit of PRBCs earlier this month through transplant physician on 11/7  -received 1 units pRBC 12/4, 1 unit pRBCs 12/5  -Will monitor and replace for hemoglobin less than 7  -Fecal occult has not been drawn yet    Abdominal and distention secondary to recurrent ascites in the setting of decompensated cirrhosis-improved  Anasarca-improved  -ED removed 3.5 L and sent for testing 12/5  -IR able to remove 10L 12/5  -IR service consulted for repeat paracentesis, with albumin  -Supportive management for underlying pain  -Continue PTA spironolactone 100 mg daily  -Initially on IV furosemide now back to oral dose 40 mg daily  -Will monitor renal function  -Direct intake and output    Cirrhosis  Bilirubinemia, coagulopathy  -Follows with Dr. Vanessa Dan  hepatology/liver transplant team outpatient, was referred for liver transplant 12/6  -Was drinking intermittently until about May to June of 2023 when her drinking became daily, about 1 bottle of wine a day, given age and drinking history is being worked up for additional causes of cirrhosis  -Has been sober since  "7/27/2023  -LFTs 12/6: , ALT 29, alk phos 235, bilirubin 4.4, INR from 12/5 2.38  -Will monitor LFTs    Tachycardia  -up to 110s  -monitoring    Subclinical Hypothyroidism due to Hashimoto's thyroiditis  -Had been on thyroid replacement in the past, states only   -12/5: TSH 4.98, fT4 1.08, fT3 pending  -will restart Levothyroxine 25 mcg daily  -would recommend  thyroid function tests recheck in 6 weeks    Lactic acidosis, resolved  -Lactic acid 3.2 > 2.5> 2.6> 1.4    Hyponatremia due to hypervolemia- improving  -Monitoring serum sodium  -IV fluids discontinued    Metabolic acidosis- resolved  -Resolved with IV fluids        Diet: Combination Diet Regular Diet Adult    DVT Prophylaxis: Pneumatic Compression Devices  Short Catheter: Not present  Lines: None     Cardiac Monitoring: ACTIVE order. Indication: Tachyarrhythmias, acute (48 hours)  Code Status: Full Code      Clinically Significant Risk Factors        # Hypokalemia: Lowest K = 3 mmol/L in last 2 days, will replace as needed     # Hypomagnesemia: Lowest Mg = 1.1 mg/dL in last 2 days, will replace as needed   # Hypoalbuminemia: Lowest albumin = 2.3 g/dL at 12/6/2023  7:09 AM, will monitor as appropriate  # Coagulation Defect: INR = 2.38 (Ref range: 0.85 - 1.15) and/or PTT = N/A, will monitor for bleeding           # Obesity: Estimated body mass index is 37.47 kg/m  as calculated from the following:    Height as of this encounter: 1.588 m (5' 2.5\").    Weight as of this encounter: 94.4 kg (208 lb 3.2 oz)., PRESENT ON ADMISSION            Disposition Plan      Expected Discharge Date: 12/08/2023        Discharge Comments: monitoring labs  blood cultures pending            Ying Chun MD  Hospitalist Service  [facility]  Securely message with Mi[employer] (more info)  Text page via [facility] Paging/Directory   ______________________________________________________________________    Interval History   Ms. Castano is doing well today. "  She had anasarca yesterday and today it has improved.  The swelling in her arms and legs as well as abdomen has improved.  She continues to have a distended abdomen and lower extremity edema but looks almost resolved in the upper extremities.  She is feeling better from a respiratory aspect as well with less watery eyes.  She has no new complaints.  Today she was sent a referral for liver transplant!    Physical Exam   Vital Signs: Temp: 99  F (37.2  C) Temp src: Oral BP: 106/56 Pulse: 119   Resp: 18 SpO2: 94 % O2 Device: None (Room air)    Weight: 208 lbs 3.2 oz    General Appearance: Awake, alert, in no acute distress  Respiratory: CTAB, no wheeze  Cardiovascular: tachycardia  GI: soft, nontender, distended, normal bowel sounds  Skin: no jaundice, no rash      Medical Decision Making       50 MINUTES SPENT BY ME on the date of service doing chart review, history, exam, documentation & further activities per the note.      Data     I have personally reviewed the following data over the past 24 hrs:    N/A  \   7.5 (L)   / N/A     132 (L) 101 5.4 (L) /  80   3.2 (L) 24 1.11 (H) \     ALT: 29 AST: 131 (H) AP: 235 (H) TBILI: 4.4 (H)   ALB: 2.3 (L) TOT PROTEIN: 6.9 LIPASE: N/A     Procal: N/A CRP: N/A Lactic Acid: 1.4         Imaging results reviewed over the past 24 hrs:   No results found for this or any previous visit (from the past 24 hour(s)).

## 2023-12-06 NOTE — PLAN OF CARE
Problem: Fluid Volume Excess  Goal: Fluid Balance  Outcome: Progressing     Problem: Pain Acute  Goal: Optimal Pain Control and Function  12/6/2023 0926 by Ana Amaro, RN  Outcome: Progressing  12/6/2023 0926 by Ana Amaro, RN  Outcome: Progressing  Intervention: Develop Pain Management Plan  Recent Flowsheet Documentation  Taken 12/6/2023 0838 by Ana Amaro, RN  Pain Management Interventions: medication (see MAR)   Goal Outcome Evaluation:    HR tachy at 122.  Pt asymptomatic.  C/O pain at abd site from paracentesis yesterday 7/10 gave oxycodone 5mg with good effect 4/10..  Ate 100%.  On RA.   Edema in Lower extremities 3+.  Pt says she feels so much better today.  Ind in room.  K+3.0, Mag 1.1.  Potassium and Mag protocol started.  Waitng on labs at 1400.  Hgb added also.

## 2023-12-06 NOTE — TELEPHONE ENCOUNTER
New liver referral - urgent     Referred by Jennifer Corbett,     Below from Dr. Luci Mendez initial visit with her on 11/8/23      Subjective:   Ms. Castano is a 28 year old woman with a history of alcohol-related liver disease who presents to [facility].   She was first told she had liver disease in July last year when she presented for detox.  Had imaging that showed, megaly with steatosis and nodular liver.  Had ascites, underwent a paracentesis that was consistent with portal hypertension admitted for SBP.  Given diuretics that she has been taking intermittently she is having hard time finding a provider to prescribe this.  After detox she went to the [place] inpatient [place] treatment, has since graduated to twice weekly outpatient treatment. Has been sober since 7/27 - had been sober for a month or so in the past. Would drink a bottle of wine a day. She is also seeing a therapist now.   Assessment/Plan:    Ms. Castano is a 28 year old woman with a history of AUD, ARLD, who presents to [facility]. She has decompensated cirrhosis as evidenced by ascites. Required one paracentesis in July, now has recurrent ascites, will get her set up for a repeat. If she has re accumulation of ascites after paracentesis will increate her diuretics. She was noted to have subacute anemia on labs, denies overt blood loss. Will arrange for outpatient blood transfusion, discussed that if she is symptomatic she should come to the ED. Discussed need for EGD in the future. Discussed the need for sobriety from alcohol lifelong. Discussed need for HCC screening. Discussed patients with ARLD who are young may have some improvement in her fibrosis. She is not sexually active currently (lives with fiance). Discussed pregnancy would be high risk given her liver disease, would recommend barrier method if active. If her liver disease recompensates with sobriety, discussed progesterone based options are safe in compensated  cirrhosis.

## 2023-12-06 NOTE — LETTER
12/6/2023    Priya Castano  [id number] [id number]      Dear Priya,    Thank you for your interest in the Transplant Center at [facility]. We look forward to being a part of your care team and assisting you through the transplant process.    As we discussed, your transplant coordinator is Ron Hirsch Jr. (Liver). You may call your coordinator at any time with questions or concerns, at [phone].    Please complete the following.    Fill out and return the enclosed forms  Authorization for Electronic Communication  Authorization to Discuss Protected Health Information  Authorization for Release of Protected Health Information    Sign up for:  [employer], access to your electronic medical record (see enclosed pamphlet)  [email or web address], a transplant education website        You can use these tools to learn more about your transplant, communicate with your care team, and track your medical details            Sincerely,      Solid Organ Transplant  [facility]    cc: Referring Physician PCP

## 2023-12-06 NOTE — PLAN OF CARE
Notified MD at 2251 PM regarding lab results.      Spoke with: message sent to     Orders were not obtained.    Comments: gram pos cocci in clusters Plan to continue current abx and defer to hospitalist in AM.

## 2023-12-06 NOTE — TELEPHONE ENCOUNTER
December 6, 2023 1:56 PM - Kena Rosa LPN:   Intake did not contact pt directly. Pt currently IP. Additional Intake workflow completed accordingly.

## 2023-12-06 NOTE — PLAN OF CARE
Problem: Infection  Goal: Absence of Infection Signs and Symptoms  Intervention: Prevent or Manage Infection  Recent Flowsheet Documentation  Taken 12/6/2023 0035 by Terrance Lara RN  Isolation Precautions: droplet precautions maintained     Problem: Adult Inpatient Plan of Care  Goal: Absence of Hospital-Acquired Illness or Injury  Intervention: Identify and Manage Fall Risk  Recent Flowsheet Documentation  Taken 12/6/2023 0035 by Terrance Lara RN  Safety Promotion/Fall Prevention:   activity supervised   clutter free environment maintained   nonskid shoes/slippers when out of bed  Intervention: Prevent Infection  Recent Flowsheet Documentation  Taken 12/6/2023 0035 by Terrance Lara RN  Infection Prevention: hand hygiene promoted     Problem: Infection  Goal: Absence of Infection Signs and Symptoms  Intervention: Prevent or Manage Infection  Recent Flowsheet Documentation  Taken 12/6/2023 0035 by Terrance Lara RN  Isolation Precautions: droplet precautions maintained     Problem: Fall Injury Risk  Goal: Absence of Fall and Fall-Related Injury  Intervention: Promote Injury-Free Environment  Recent Flowsheet Documentation  Taken 12/6/2023 0035 by Terrance Lara RN  Safety Promotion/Fall Prevention:   activity supervised   clutter free environment maintained   nonskid shoes/slippers when out of bed   Goal Outcome Evaluation  Pt is alerted and oriented x 4, on tele and sinus tachy, denied pain, lab result gram positive cocci in cluster, Monitor straight I&O, hemoglobin protoclo.  Terrance Lara RN

## 2023-12-07 ENCOUNTER — APPOINTMENT (OUTPATIENT)
Dept: RADIOLOGY | Facility: HOSPITAL | Age: 28
DRG: 432 | End: 2023-12-07
Attending: INTERNAL MEDICINE

## 2023-12-07 LAB
ALBUMIN SERPL BCG-MCNC: 1.8 G/DL (ref 3.5–5.2)
ALP SERPL-CCNC: 192 U/L (ref 40–150)
ALT SERPL W P-5'-P-CCNC: 24 U/L (ref 0–50)
ANION GAP SERPL CALCULATED.3IONS-SCNC: 8 MMOL/L (ref 7–15)
AST SERPL W P-5'-P-CCNC: 97 U/L (ref 0–45)
BILIRUB SERPL-MCNC: 2.7 MG/DL
BUN SERPL-MCNC: 7.4 MG/DL (ref 6–20)
CALCIUM SERPL-MCNC: 6.9 MG/DL (ref 8.6–10)
CHLORIDE SERPL-SCNC: 101 MMOL/L (ref 98–107)
CREAT SERPL-MCNC: 1.87 MG/DL (ref 0.51–0.95)
DEPRECATED HCO3 PLAS-SCNC: 21 MMOL/L (ref 22–29)
EGFRCR SERPLBLD CKD-EPI 2021: 37 ML/MIN/1.73M2
ERYTHROCYTE [DISTWIDTH] IN BLOOD BY AUTOMATED COUNT: 22.7 % (ref 10–15)
GLUCOSE SERPL-MCNC: 97 MG/DL (ref 70–99)
HCT VFR BLD AUTO: 23.2 % (ref 35–47)
HEMOCCULT STL QL: POSITIVE
HGB BLD-MCNC: 7.4 G/DL (ref 11.7–15.7)
INR PPP: 2.57 (ref 0.85–1.15)
MAGNESIUM SERPL-MCNC: 2.3 MG/DL (ref 1.7–2.3)
MCH RBC QN AUTO: 27.7 PG (ref 26.5–33)
MCHC RBC AUTO-ENTMCNC: 31.9 G/DL (ref 31.5–36.5)
MCV RBC AUTO: 87 FL (ref 78–100)
PLATELET # BLD AUTO: 111 10E3/UL (ref 150–450)
POTASSIUM SERPL-SCNC: 3.5 MMOL/L (ref 3.4–5.3)
PROT SERPL-MCNC: 5.8 G/DL (ref 6.4–8.3)
RBC # BLD AUTO: 2.67 10E6/UL (ref 3.8–5.2)
SODIUM SERPL-SCNC: 130 MMOL/L (ref 135–145)
T3FREE SERPL-MCNC: 1.6 PG/ML (ref 2–4.4)
WBC # BLD AUTO: 15.1 10E3/UL (ref 4–11)

## 2023-12-07 PROCEDURE — 99233 SBSQ HOSP IP/OBS HIGH 50: CPT | Performed by: INTERNAL MEDICINE

## 2023-12-07 PROCEDURE — 83735 ASSAY OF MAGNESIUM: CPT | Performed by: INTERNAL MEDICINE

## 2023-12-07 PROCEDURE — 250N000011 HC RX IP 250 OP 636: Mod: JZ | Performed by: INTERNAL MEDICINE

## 2023-12-07 PROCEDURE — 85027 COMPLETE CBC AUTOMATED: CPT | Performed by: INTERNAL MEDICINE

## 2023-12-07 PROCEDURE — 80053 COMPREHEN METABOLIC PANEL: CPT | Performed by: INTERNAL MEDICINE

## 2023-12-07 PROCEDURE — P9047 ALBUMIN (HUMAN), 25%, 50ML: HCPCS | Performed by: INTERNAL MEDICINE

## 2023-12-07 PROCEDURE — 250N000013 HC RX MED GY IP 250 OP 250 PS 637: Performed by: INTERNAL MEDICINE

## 2023-12-07 PROCEDURE — 82272 OCCULT BLD FECES 1-3 TESTS: CPT | Performed by: INTERNAL MEDICINE

## 2023-12-07 PROCEDURE — 73090 X-RAY EXAM OF FOREARM: CPT | Mod: RT

## 2023-12-07 PROCEDURE — 250N000011 HC RX IP 250 OP 636: Performed by: INTERNAL MEDICINE

## 2023-12-07 PROCEDURE — 85610 PROTHROMBIN TIME: CPT | Performed by: INTERNAL MEDICINE

## 2023-12-07 PROCEDURE — 36415 COLL VENOUS BLD VENIPUNCTURE: CPT | Performed by: INTERNAL MEDICINE

## 2023-12-07 PROCEDURE — 120N000001 HC R&B MED SURG/OB

## 2023-12-07 RX ORDER — ALBUMIN (HUMAN) 12.5 G/50ML
50 SOLUTION INTRAVENOUS DAILY
Status: COMPLETED | OUTPATIENT
Start: 2023-12-07 | End: 2023-12-08

## 2023-12-07 RX ORDER — LACTULOSE 10 G/15ML
10 SOLUTION ORAL DAILY
Status: DISCONTINUED | OUTPATIENT
Start: 2023-12-07 | End: 2023-12-10 | Stop reason: HOSPADM

## 2023-12-07 RX ADMIN — OXYCODONE HYDROCHLORIDE 5 MG: 5 TABLET ORAL at 18:45

## 2023-12-07 RX ADMIN — LEVOTHYROXINE SODIUM 25 MCG: 0.03 TABLET ORAL at 06:35

## 2023-12-07 RX ADMIN — ALBUMIN HUMAN 50 G: 0.25 SOLUTION INTRAVENOUS at 10:02

## 2023-12-07 RX ADMIN — OXYCODONE HYDROCHLORIDE 5 MG: 5 TABLET ORAL at 09:02

## 2023-12-07 RX ADMIN — LACTULOSE 10 G: 10 SOLUTION ORAL at 12:17

## 2023-12-07 RX ADMIN — CEFTRIAXONE SODIUM 1 G: 1 INJECTION, POWDER, FOR SOLUTION INTRAMUSCULAR; INTRAVENOUS at 08:50

## 2023-12-07 ASSESSMENT — ACTIVITIES OF DAILY LIVING (ADL)
ADLS_ACUITY_SCORE: 20

## 2023-12-07 NOTE — PROGRESS NOTES
[facility] Progress Note - Hospitalist Service    Date of Admission:  12/4/2023    Assessment & Plan   Expand All Collapse All    [facility] Progress Note - Hospitalist Service     Date of Admission:  12/4/2023        Assessment & Plan  Priya Castano is a 28 year old female admitted on 12/4/2023. She presented to the ER with complaints of abdominal pain and distention, and is status post paracentesis with drainage of 3.5 L on ED admission and 10 L of ascitic fluid on 12/5.  No IV albumin was given after the procedure.  S she was initially admitted for sepsis and RSV but now developing acute renal failure after the paracentesis.  IV albumin ordered on 12/7.        Sepsis secondary to RSV and possibly SBP  -RSV positive on admission  -Ascites fluid cultures 12/5: NGTD  -Initially treated with Vanco and IV Rocephin but de-escalate to IV Rocephin only due to negative cultures and worsening kidney function  -Supportive cares for RSV      Acute anemia on chronic disease requiring transfusion  -rule out acute anemia  -Has had severe anemia related to cirrhosis in the recent past, received a unit of PRBCs earlier this month through transplant physician on 11/7  -received 1 units pRBC 12/4, 1 unit pRBCs 12/5  -Will monitor and replace for hemoglobin less than 7  -Fecal occult pending  --May need outpatient EGD to rule out varices.     Abdominal and distention secondary to recurrent ascites in the setting of decompensated cirrhosis  Hepatic encephalopathy  -ED removed 3.5 L and sent for testing  -IR able to remove 10L  -I no IV albumin given.  -Supportive management for underlying pain  -Initially treated with spironolactone and IV Lasix but now hold it due to acute renal failure  ----- Order lactulose 10 g daily, given asterixis.  -Direct intake and output    Hyponatremia and acute renal failure likely due to dehydration from paracentesis and  overdiuresis  -- Hold diuretics including Lasix spironolactone  -Will order 50 g of IV albumin x 2 days to improve renal perfusion  -- DC vancomycin due to nephrotoxicity  -- If no improvement in renal function then would suspect hepatorenal syndrome and consult nephrology.  -Discussed with patient       Alcohol related cirrhosis  -Follows with Dr. Vanessa Peguero hepatology/liver transplant team outpatient, planning to get worked up for liver transplant  -Was drinking intermittently until about May to June of 2023 when her drinking became daily, about 1 bottle of wine a day, given age and drinking history is being worked up for additional causes of cirrhosis  -Has been sober since 7/27/2023       Acquired hypothyroidism due to Hashimoto's thyroiditis  -Has been on thyroid replacement in the past  Since thyroid function are within normal limits , will DC levothyroxine that was started on this admission by previous hospitalist.  Repeat TSH in 3 months.     Hyperbilirubinemia in the setting of alcoholic cirrhosis  -Trend serum bilirubin     Elevated INR the setting of liver disease  -No bleeding, no indication for vitamin K  -Trend INR       Metabolic acidosis  -Likely due to acute renal failure.     Hypokalemia resolved.            Diet: Combination Diet Regular Diet Adult  Room Service    DVT Prophylaxis: Low Risk/Ambulatory with no VTE prophylaxis indicated  Short Catheter: Not present  Lines: None     Cardiac Monitoring: ACTIVE order. Indication: Tachyarrhythmias, acute (48 hours)  Code Status: Full Code      Clinically Significant Risk Factors        # Hypokalemia: Lowest K = 3 mmol/L in last 2 days, will replace as needed     # Hypomagnesemia: Lowest Mg = 1.1 mg/dL in last 2 days, will replace as needed   # Hypoalbuminemia: Lowest albumin = 1.8 g/dL at 12/7/2023  6:30 AM, will monitor as appropriate  # Coagulation Defect: INR = 2.57 (Ref range: 0.85 - 1.15) and/or PTT = N/A, will monitor for bleeding  #  "Thrombocytopenia: Lowest platelets = 111 in last 2 days, will monitor for bleeding  # Acute Kidney Injury, unspecified: based on a >150% or 0.3 mg/dL increase in last creatinine compared to past 90 day average, will monitor renal function         # Obesity: Estimated body mass index is 37.47 kg/m  as calculated from the following:    Height as of this encounter: 1.588 m (5' 2.5\").    Weight as of this encounter: 94.4 kg (208 lb 3.2 oz)., PRESENT ON ADMISSION            Disposition Plan      Expected Discharge Date: 12/08/2023        Discharge Comments: monitoring labs  blood cultures pending            Memo Roque MD  Hospitalist Service  [facility]  Securely message with [employer] App (more info)  Text page via [employer] Paging/Directory   ______________________________________________________________________    Interval History   Patient new to me.  Chart reviewed.  WBC trending down.  Creatinine is trending up at 1.88.  Bilateral leg swelling is improving.  Last alcoholic drink was in June, 2023.  Patient had 10 L of paracentesis done on 12/5.  On IV albumin was given after the procedure.  Patient had only 1 bowel movement today.    Physical Exam   Vital Signs: Temp: 99.1  F (37.3  C) Temp src: Oral BP: 115/55 Pulse: 119   Resp: 18 SpO2: 96 % O2 Device: None (Room air)    Weight: 208 lbs 3.2 oz    Alert oriented x 3  Asterixis  Distended abdomen with dullness to percussion in the right lateral flank.  1+ leg edema  Right-sided inspiratory crackles      Medical Decision Making       55 MINUTES SPENT BY ME on the date of service doing chart review, history, exam, documentation & further activities per the note.  MANAGEMENT DISCUSSED with the following over the past 24 hours: Patient   NOTE(S)/MEDICAL RECORDS REVIEWED over the past 24 hours: Previous hospitalist notes       Data     I have personally reviewed the following data over the past 24 hrs:    15.1 (H)  \   7.4 (L)   / 111 (L)     130 (L) " 101 7.4 /  97   3.5 21 (L) 1.87 (H) \     ALT: 24 AST: 97 (H) AP: 192 (H) TBILI: 2.7 (H)   ALB: 1.8 (L) TOT PROTEIN: 5.8 (L) LIPASE: N/A     INR:  2.57 (H) PTT:  N/A   D-dimer:  N/A Fibrinogen:  N/A       Imaging results reviewed over the past 24 hrs:   No results found for this or any previous visit (from the past 24 hour(s)).

## 2023-12-07 NOTE — PLAN OF CARE
Problem: Infection  Goal: Absence of Infection Signs and Symptoms  Outcome: Progressing     Problem: Adult Inpatient Plan of Care  Goal: Optimal Comfort and Wellbeing  Outcome: Progressing   Goal Outcome Evaluation:         Pt alert and oriented, VSS on room air. Denying pain. Moves independently in the room. On K and Mg protocol, both replaced. K recheck at 2100 and Mg recheck at 2200. On tele, sinus tachy. Hgb 7.5 today. Call light within reach, bed alarm on.

## 2023-12-07 NOTE — PLAN OF CARE
Goal Outcome Evaluation:             Problem: Adult Inpatient Plan of Care  Goal: Plan of Care Review  Description: The Plan of Care Review/Shift note should be completed every shift.  The Outcome Evaluation is a brief statement about your assessment that the patient is improving, declining, or no change.  This information will be displayed automatically on your shift  note.  12/7/2023 0414 by Armando Crum RN  Outcome: Progressing  Flowsheets (Taken 12/7/2023 0414)  Overall Patient Progress: --     Alert and oriented. Independent to the bathroom. Complained of abdominal discomfort PRN Oxycodone given with good effect. Sinus tachy on tele. Denies SOB or difficulty of breathing. On K+ and Mag protocol last result on normal level will recheck this am. VSS. Afebrile.

## 2023-12-07 NOTE — PLAN OF CARE
Problem: Adult Inpatient Plan of Care  Goal: Plan of Care Review  Description: The Plan of Care Review/Shift note should be completed every shift.  The Outcome Evaluation is a brief statement about your assessment that the patient is improving, declining, or no change.  This information will be displayed automatically on your shift  note.  Outcome: Progressing     Problem: Adult Inpatient Plan of Care  Goal: Absence of Hospital-Acquired Illness or Injury  Intervention: Identify and Manage Fall Risk  Recent Flowsheet Documentation  Taken 12/7/2023 0941 by Russ Patton RN  Safety Promotion/Fall Prevention:   nonskid shoes/slippers when out of bed   clutter free environment maintained     Problem: Adult Inpatient Plan of Care  Goal: Optimal Comfort and Wellbeing  Intervention: Monitor Pain and Promote Comfort  Recent Flowsheet Documentation  Taken 12/7/2023 0902 by Russ Patton RN  Pain Management Interventions: medication (see MAR)     Problem: Infection  Goal: Absence of Infection Signs and Symptoms  Outcome: Progressing  Intervention: Prevent or Manage Infection  Recent Flowsheet Documentation  Taken 12/7/2023 0941 by Russ Patton RN  Isolation Precautions:   droplet precautions maintained   cytotoxic precautions maintained     Problem: Fluid Volume Excess  Goal: Fluid Balance  Outcome: Progressing     Problem: Electrolyte Imbalance  Goal: Electrolyte Balance  Outcome: Progressing     Problem: Pain Acute  Goal: Optimal Pain Control and Function  Intervention: Develop Pain Management Plan  Recent Flowsheet Documentation  Taken 12/7/2023 0902 by Russ Patton RN  Pain Management Interventions: medication (see MAR)   Pt is alert and oriented x 4. Abdomen is still distended and discomfort especially with coughing. Requested oxycodone x 1. Able to communicate needs.  Received albumin per order. Vs is stable.

## 2023-12-08 ENCOUNTER — APPOINTMENT (OUTPATIENT)
Dept: ULTRASOUND IMAGING | Facility: HOSPITAL | Age: 28
DRG: 432 | End: 2023-12-08
Attending: INTERNAL MEDICINE

## 2023-12-08 DIAGNOSIS — D64.9 ANEMIA, UNSPECIFIED TYPE: ICD-10-CM

## 2023-12-08 DIAGNOSIS — K70.11 ALCOHOLIC HEPATITIS WITH ASCITES (H): Primary | ICD-10-CM

## 2023-12-08 LAB
ANION GAP SERPL CALCULATED.3IONS-SCNC: 9 MMOL/L (ref 7–15)
BACTERIA BLD CULT: ABNORMAL
BUN SERPL-MCNC: 8.3 MG/DL (ref 6–20)
CALCIUM SERPL-MCNC: 7.3 MG/DL (ref 8.6–10)
CHLORIDE SERPL-SCNC: 99 MMOL/L (ref 98–107)
CREAT SERPL-MCNC: 2.3 MG/DL (ref 0.51–0.95)
DEPRECATED HCO3 PLAS-SCNC: 21 MMOL/L (ref 22–29)
EGFRCR SERPLBLD CKD-EPI 2021: 29 ML/MIN/1.73M2
GLUCOSE SERPL-MCNC: 95 MG/DL (ref 70–99)
HOLD SPECIMEN: NORMAL
INR PPP: 2.42 (ref 0.85–1.15)
MAGNESIUM SERPL-MCNC: 2.2 MG/DL (ref 1.7–2.3)
OSMOLALITY SERPL: 270 MMOL/KG (ref 275–295)
POTASSIUM SERPL-SCNC: 3.4 MMOL/L (ref 3.4–5.3)
POTASSIUM SERPL-SCNC: 3.7 MMOL/L (ref 3.4–5.3)
SODIUM SERPL-SCNC: 129 MMOL/L (ref 135–145)

## 2023-12-08 PROCEDURE — 120N000001 HC R&B MED SURG/OB

## 2023-12-08 PROCEDURE — 83930 ASSAY OF BLOOD OSMOLALITY: CPT | Performed by: NURSE PRACTITIONER

## 2023-12-08 PROCEDURE — P9047 ALBUMIN (HUMAN), 25%, 50ML: HCPCS | Performed by: INTERNAL MEDICINE

## 2023-12-08 PROCEDURE — 83735 ASSAY OF MAGNESIUM: CPT | Performed by: INTERNAL MEDICINE

## 2023-12-08 PROCEDURE — 36415 COLL VENOUS BLD VENIPUNCTURE: CPT | Performed by: INTERNAL MEDICINE

## 2023-12-08 PROCEDURE — 84132 ASSAY OF SERUM POTASSIUM: CPT | Performed by: INTERNAL MEDICINE

## 2023-12-08 PROCEDURE — 250N000013 HC RX MED GY IP 250 OP 250 PS 637: Performed by: INTERNAL MEDICINE

## 2023-12-08 PROCEDURE — 250N000011 HC RX IP 250 OP 636: Performed by: NURSE PRACTITIONER

## 2023-12-08 PROCEDURE — 0W9G3ZZ DRAINAGE OF PERITONEAL CAVITY, PERCUTANEOUS APPROACH: ICD-10-PCS | Performed by: RADIOLOGY

## 2023-12-08 PROCEDURE — 85610 PROTHROMBIN TIME: CPT | Performed by: INTERNAL MEDICINE

## 2023-12-08 PROCEDURE — 272N000710 US PARACENTESIS WITHOUT ALBUMIN

## 2023-12-08 PROCEDURE — 250N000011 HC RX IP 250 OP 636: Mod: JZ | Performed by: INTERNAL MEDICINE

## 2023-12-08 PROCEDURE — 250N000011 HC RX IP 250 OP 636: Performed by: INTERNAL MEDICINE

## 2023-12-08 PROCEDURE — 80048 BASIC METABOLIC PNL TOTAL CA: CPT | Performed by: INTERNAL MEDICINE

## 2023-12-08 PROCEDURE — 99254 IP/OBS CNSLTJ NEW/EST MOD 60: CPT | Mod: FS | Performed by: INTERNAL MEDICINE

## 2023-12-08 PROCEDURE — 99233 SBSQ HOSP IP/OBS HIGH 50: CPT | Performed by: INTERNAL MEDICINE

## 2023-12-08 PROCEDURE — P9047 ALBUMIN (HUMAN), 25%, 50ML: HCPCS | Performed by: NURSE PRACTITIONER

## 2023-12-08 RX ORDER — ALBUMIN (HUMAN) 12.5 G/50ML
50 SOLUTION INTRAVENOUS 2 TIMES DAILY
Qty: 800 ML | Refills: 0 | Status: COMPLETED | OUTPATIENT
Start: 2023-12-08 | End: 2023-12-10

## 2023-12-08 RX ORDER — POTASSIUM CHLORIDE 1500 MG/1
40 TABLET, EXTENDED RELEASE ORAL ONCE
Status: COMPLETED | OUTPATIENT
Start: 2023-12-08 | End: 2023-12-08

## 2023-12-08 RX ORDER — MIDODRINE HYDROCHLORIDE 2.5 MG/1
2.5 TABLET ORAL
Status: DISCONTINUED | OUTPATIENT
Start: 2023-12-08 | End: 2023-12-10 | Stop reason: HOSPADM

## 2023-12-08 RX ORDER — ACETAMINOPHEN 325 MG/1
650 TABLET ORAL EVERY 4 HOURS PRN
Status: DISCONTINUED | OUTPATIENT
Start: 2023-12-08 | End: 2023-12-10 | Stop reason: HOSPADM

## 2023-12-08 RX ORDER — LORAZEPAM 0.5 MG/1
0.5 TABLET ORAL EVERY 4 HOURS PRN
Status: DISCONTINUED | OUTPATIENT
Start: 2023-12-08 | End: 2023-12-10 | Stop reason: HOSPADM

## 2023-12-08 RX ADMIN — LACTULOSE 10 G: 10 SOLUTION ORAL at 09:01

## 2023-12-08 RX ADMIN — OXYCODONE HYDROCHLORIDE 5 MG: 5 TABLET ORAL at 21:43

## 2023-12-08 RX ADMIN — LORAZEPAM 0.5 MG: 0.5 TABLET ORAL at 17:45

## 2023-12-08 RX ADMIN — ALBUMIN HUMAN 50 G: 0.25 SOLUTION INTRAVENOUS at 10:51

## 2023-12-08 RX ADMIN — Medication 5 MG: at 11:39

## 2023-12-08 RX ADMIN — POTASSIUM CHLORIDE 40 MEQ: 1500 TABLET, EXTENDED RELEASE ORAL at 10:51

## 2023-12-08 RX ADMIN — OXYCODONE HYDROCHLORIDE 5 MG: 5 TABLET ORAL at 01:09

## 2023-12-08 RX ADMIN — CEFTRIAXONE SODIUM 1 G: 1 INJECTION, POWDER, FOR SOLUTION INTRAMUSCULAR; INTRAVENOUS at 09:01

## 2023-12-08 RX ADMIN — OXYCODONE HYDROCHLORIDE 5 MG: 5 TABLET ORAL at 15:08

## 2023-12-08 RX ADMIN — OXYCODONE HYDROCHLORIDE 5 MG: 5 TABLET ORAL at 09:01

## 2023-12-08 RX ADMIN — ALBUMIN HUMAN 50 G: 0.25 SOLUTION INTRAVENOUS at 20:08

## 2023-12-08 ASSESSMENT — ACTIVITIES OF DAILY LIVING (ADL)
ADLS_ACUITY_SCORE: 20
DEPENDENT_IADLS:: INDEPENDENT
ADLS_ACUITY_SCORE: 20
ADLS_ACUITY_SCORE: 20

## 2023-12-08 NOTE — PROGRESS NOTES
[facility] Progress Note - Hospitalist Service    Date of Admission:  12/4/2023    Assessment & Plan   Expand All Collapse All    [facility] Progress Note - Hospitalist Service     Date of Admission:  12/4/2023        Assessment & Plan  Priya Castano is a 28 year old female admitted on 12/4/2023. She presented to the ER with complaints of abdominal pain and distention, and is status post paracentesis with drainage of 3.5 L on ED admission and 10 L of ascitic fluid on 12/5.  No IV albumin was given after the procedure.  S she was initially admitted for sepsis and RSV but now developing acute renal failure after the paracentesis.  IV albumin ordered on 12/7.  Despite albumin creatinine continues to worsen.  I called [facility] liver transplant team and spoke to Sandra at [phone].  She advised me to transfer the patient to the  under hospitalist team but with Dr. Luci Davis as a consultant hepatologist.        Sepsis secondary to RSV and possibly SBP  -RSV positive on admission  -Ascites fluid cultures 12/5: NGTD  -Initially treated with Vanco and IV Rocephin but de-escalate to IV Rocephin only due to negative cultures and worsening kidney function.  DC Rocephin due to negative cultures  -Supportive cares for RSV      Acute anemia on chronic disease requiring transfusion  -rule out acute anemia  -Has had severe anemia related to cirrhosis in the recent past, received a unit of PRBCs earlier this month through transplant physician on 11/7  -received 1 units pRBC 12/4, 1 unit pRBCs 12/5  -Will monitor and replace for hemoglobin less than 7  -Fecal occult p is positive  --May need outpatient EGD to rule out varices.         Abdominal and distention secondary to recurrent ascites in the setting of decompensated cirrhosis  Hepatic encephalopathy  MELD-Na score of 31 today.  Awaiting bed at the  for transfer to liver transplant team under Dr. Verma  Ryan, consulted hepatologist.  -ED removed 3.5 L and sent for testing  -IR able to remove 10L.  Ordered therapeutic paracentesis today.  Radiologist requested to tab no more than 5 L.  -I no IV albumin given.  -Supportive management for underlying pain  -Initially treated with spironolactone and IV Lasix but now hold it due to acute renal failure  -----Asterixis improved with lactulose  -Direct intake and output    Hyponatremia and acute renal failure likely due to dehydration from paracentesis and overdiuresis.  Hepatorenal syndrome suspected  -- Hold diuretics including Lasix, spironolactone. avoid nephrotoxic substances  NSAIDs  -Received 50 g of IV albumin on 12/7 but creatinine continued to worsen and therefore nephrology consulted.  Currently on 50 g of IV albumin twice daily.  -- DC vancomycin due to nephrotoxicity.  DC Rocephin given negative cultures.  --Midodrine started by nephrology.  -Discussed with patient       Alcohol related cirrhosis  -Follows with Dr. Vanessa Dan  hepatology/liver transplant team outpatient,   -Awaiting transfer to the  as mentioned above  -Was drinking intermittently until about May to June of 2023 when her drinking became daily, about 1 bottle of wine a day, given age and drinking history is being worked up for additional causes of cirrhosis  -Has been sober since 7/27/2023       Acquired hypothyroidism due to Hashimoto's thyroiditis  -Has been on thyroid replacement in the past  Since thyroid function are within normal limits , will DC levothyroxine that was started on this admission by previous hospitalist.  Repeat TSH in 3 months.     Hyperbilirubinemia in the setting of alcoholic cirrhosis  -Trend serum bilirubin     Elevated INR the setting of liver disease  -Ordered vitamin K 5 mg daily due to spontaneous development of hematoma on the right forearm on 12/7       Metabolic acidosis  -Likely due to acute renal failure.     Hypokalemia resolved.    Spontaneous  "hematoma of the right forearm on 12/7  -- Patient denies any trauma  -- X-ray ordered and negative for fracture or dislocation  -- INR is 2.42.  Ordered vitamin K    Addendum Memo Roque MD, Hospitalist,12/08/23,3:11 PM  5 L of  ascitic fluid removed on 12/8    Addendum Memo Roque MD, Hospitalist,12/08/23,3:31 PM  Spoke to Dr. Luci Davis and Dr. Johnson (hospitalist) who accepted the patient     Diet: Room Service  Snacks/Supplements Adult: Ensure Max Protein (bariatric); With Meals  Combination Diet Regular Diet Adult; 2 gm NA Diet    DVT Prophylaxis: Low Risk/Ambulatory with no VTE prophylaxis indicated  Short Catheter: Not present  Lines: None     Cardiac Monitoring: None  Code Status: Full Code      Clinically Significant Risk Factors         # Hyponatremia: Lowest Na = 129 mmol/L in last 2 days, will monitor as appropriate      # Hypoalbuminemia: Lowest albumin = 1.8 g/dL at 12/7/2023  6:30 AM, will monitor as appropriate    # Coagulation Defect: INR = 2.42 (Ref range: 0.85 - 1.15) and/or PTT = N/A, will monitor for bleeding  # Thrombocytopenia: Lowest platelets = 111 in last 2 days, will monitor for bleeding  # Acute Kidney Injury, unspecified: based on a >150% or 0.3 mg/dL increase in last creatinine compared to past 90 day average, will monitor renal function         # Obesity: Estimated body mass index is 34.18 kg/m  as calculated from the following:    Height as of this encounter: 1.588 m (5' 2.5\").    Weight as of this encounter: 86.1 kg (189 lb 14.4 oz)., PRESENT ON ADMISSION            Disposition Plan     Expected Discharge Date: 12/08/2023        Discharge Comments: monitoring labs  blood cultures pending            Memo Roque MD  Hospitalist Service  [facility]  Securely message with [employer] (more info)  Text page via [employer] Paging/Directory   ______________________________________________________________________    Interval History   Patient had 2 bowel movements " overnight.  Creatinine continues to worsen at 2.3.  Nephrology consulted.  Has increased in abdominal distention.  Ordered paracentesis.  Received a call from transplant team.  Request put for bed at the ; awaiting bed for transfer.  Patient urinating well.'s patient had spontaneous development of hematoma on the right forearm yesterday.  X-ray of the right forearm was negative for fracture or dislocation.  Denies any chest pain or lightheadedness    Physical Exam   Vital Signs: Temp: 98.4  F (36.9  C) Temp src: Oral BP: 117/72 Pulse: 108   Resp: 18 SpO2: 97 % O2 Device: None (Room air)    Weight: 189 lbs 14.4 oz    Alert oriented x 3  No asterixis  Distended abdomen with dullness to percussion in the right lateral flank.  1+ leg edema  Right-sided inspiratory crackles  Conjunctivitis pale      Medical Decision Making       55 MINUTES SPENT BY ME on the date of service doing chart review, history, exam, documentation & further activities per the note.  MANAGEMENT DISCUSSED with the following over the past 24 hours: Patient and nephrology.  Also coordinated care with [facility] transplant team.  NOTE(S)/MEDICAL RECORDS REVIEWED over the past 24 hours: Previous hospitalist notes, nephrology.    Data     I have personally reviewed the following data over the past 24 hrs:    N/A  \   N/A   / N/A     129 (L) 99 8.3 /  95   3.7 21 (L) 2.30 (H) \     INR:  2.42 (H) PTT:  N/A   D-dimer:  N/A Fibrinogen:  N/A       Imaging results reviewed over the past 24 hrs:   Recent Results (from the past 24 hour(s))   XR Forearm Right 2 Views    Narrative    EXAM: XR FOREARM RIGHT 2 VIEWS  LOCATION: [facility]  DATE: 12/7/2023    INDICATION: Trauma. Right arm swelling.  COMPARISON: None.      Impression    IMPRESSION:     No evidence of acute fracture or dislocation. Joint spaces are preserved. Mild subcutaneous edema of the mid forearm.

## 2023-12-08 NOTE — PROGRESS NOTES
"Transfer Type: [facility]  Transfer Triage Note    Date of call: 12/08/23  Time of call: 3:22 PM    Current Patient Location:  [facility]  Current Level of Care: Med Surg    Vitals: Temp: 98.4  F (36.9  C) Temp src: Oral BP: 117/72 Pulse: 108   Resp: 18 SpO2: 97 % Height: 158.8 cm (5' 2.5\") Weight: 81.3 kg (179 lb 4.8 oz) (after paracentesis.)  O2 Device: None (Room air) at    Diagnosis: decompensated cirrhosis; acute renal injury  Reason for requested transfer: Further diagnostic work up, management, and consultation for specialized care   Isolation Needs: Contact and Droplet    If patient is transferring for specialty care or specific procedure, the specialist required has participated in the transfer call and agreed with need for transfer and anticipated timeline: Yes, Provider name: Dr. Luci Davis with hepatology.    Transfer accepted: Yes  Stability of Patient: Patient is at risk for instability, however patient requires urgent transfer and does not meet ICU criteria   Is the patient appropriate for [facility]? No, What specific [place] needs are anticipated? hepatology  Level of Care Needed: Med Surg  Telemetry Needed:  None  Expected Time of Arrival for Transfer: 8-24 hours  Arrival Location:  [facility]     Recommendations for Management and Stabilization: Given    Additional Comments: I had the opportunity to speak with Dr. Roque at [facility] and Dr. Mendez of [facility] hepatology.     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 She also had RSV on admission. Early in hospital stay she had two large volumes paracentesis, NSAIDs, vancomycin and suffered progressive renal injury despite albumin bolus.     Dr. Mendez requests transfer for specialized hepatology care including " consideration of vasopressin analogue, but without insurance established would probably not be evaluating further for transplant at this time. She is otherwise a good candidate for transplant medically and psycho-socially.    Dr. Mendez recommended daily albumin, holding on further paracentesis as much as possible.    She is accepted for transfer to [facility]. [place] bed anticipated in the next 1-2 days.    Doroteo Andres DO

## 2023-12-08 NOTE — CONSULTS
Care Management Initial Consult    General Information  Assessment completed with: Patient,    Type of CM/SW Visit: CM Role Introduction    Primary Care Provider verified and updated as needed:     Readmission within the last 30 days:   no     Reason for Consult: discharge planning  Advance Care Planning: Advance Care Planning Reviewed: no concerns identified          Communication Assessment  Patient's communication style: spoken language (English or Bilingual)    Hearing Difficulty or Deaf: no   Wear Glasses or Blind: no    Cognitive  Cognitive/Neuro/Behavioral: WDL                      Living Environment:   People in home: parent(s)     Current living Arrangements: house      Able to return to prior arrangements: yes       Family/Social Support:  Care provided by: self  Provides care for: no one  Marital Status: Single  Parent(s), Sibling(s)          Description of Support System: Supportive, Involved    Support Assessment: Adequate family and caregiver support    Current Resources:   Patient receiving home care services: No     Community Resources: None  Equipment currently used at home: none  Supplies currently used at home: None    Employment/Financial:  Employment Status: employed full-time     Employment/ Comments: / no   Financial Concerns:   Met with financial  for medical assistance. Works full time.           Does the patient's insurance plan have a 3 day qualifying hospital stay waiver?  No    Lifestyle & Psychosocial Needs:  Social Determinants of Health     Food Insecurity: Not on file   Depression: Not at risk (8/8/2023)    PHQ-2     PHQ-2 Score: 0   Housing Stability: Not on file   Tobacco Use: Low Risk  (12/6/2023)    Patient History     Smoking Tobacco Use: Never     Smokeless Tobacco Use: Never     Passive Exposure: Not on file   Financial Resource Strain: Not on file   Alcohol Use: Not on file   Transportation Needs: Not on file   Physical Activity:  Not on file   Interpersonal Safety: Not on file   Stress: Not on file   Social Connections: Not on file       Functional Status:  Prior to admission patient needed assistance:   Dependent ADLs:: Independent  Dependent IADLs:: Independent       Mental Health Status:  Mental Health Status: No Current Concerns       Chemical Dependency Status:  Chemical Dependency Status: No Current Concerns             Values/Beliefs:  Spiritual, Cultural Beliefs, Shinto Practices, Values that affect care:    Description of Beliefs that Will Affect Care: Judaism            Additional Information:  SW completed assessment with pt over the phone due to droplet precautions. Pt prior to admission has been independent and works full time at a insurance agency. Pt currently does not have health insurance, she has met with financial  and started paperwork for medical assistance. Pt lives with her parents in a private home. Pt anticipates she will return to her parents home. She reports she went to inpatient treatment and that it helped her become sober. Pt is on transplant list at the  of  and is hoping to go there soon.     CARLINE Leigh

## 2023-12-08 NOTE — PLAN OF CARE
Problem: Adult Inpatient Plan of Care  Goal: Plan of Care Review  Description: The Plan of Care Review/Shift note should be completed every shift.  The Outcome Evaluation is a brief statement about your assessment that the patient is improving, declining, or no change.  This information will be displayed automatically on your shift  note.  Outcome: Progressing     Problem: Pain Acute  Goal: Optimal Pain Control and Function  Intervention: Prevent or Manage Pain  Recent Flowsheet Documentation  Taken 12/8/2023 0500 by Breonna Cadet, RN  Medication Review/Management: medications reviewed  Taken 12/8/2023 0045 by Breonna Cadet, RN  Medication Review/Management: medications reviewed          VSS. Pt is independent in room, can make her needs known. Abdominal pain controlled with prn oxycodone. On mag/K+ protocols. Right arm bruised, and hard lump under skin noted;  x-ray negative. Slept intermittently.

## 2023-12-08 NOTE — CONSULTS
RENAL CONSULT NOTE    REQUESTING PHYSICIAN: MD CONRAD    REASON FOR CONSULT: Acute renal failure in pt with liver cirrhosis with ascites. likely hepatorenal syndrome. did not respond to one dose of IV albumin     PLAN:  ? If Para today planned, would limit to 5L max, as I suspect high volume taps driving FELIX to some degree; rec more frequent lower volume taps as needed (defer management to [facility] and hepatology team)   No further NSAIDs  Cont to hold diuretics today  Add schedule Alb 50g BIDx 4 doses  Add Midodrine 2.5mg TID with hold parameters if SBP  >115  BMP daily for now  K replacement per protocol ok for now unless worsening FELIX   Ck urine studies, Na, UA osmols  Add protein supplements BID between meals, ana if intake low   NO fluid restriction     ASSESSMENT::  Acute Kidney Insufficiency:  What appears to be modestly  progressive renal failure since 7/2023 with incremental rise in sCR from 0.4---->0.8 over the last 5 months, likely multifactorial--> but most c/w prerenal hemodynamics  with high volume/samira and ongoing diuretics and no Albumin at that time (Furosemide  and Aldactone last dosed 12/6--> para 12/4) and possibly unrecognized or relative HoTN; she also received Toradol 12/5 and oral NSAIDs post para --both have been DC'd), less likely Vanco tox (has been Dc'd regardless; in addition anemia with admit hgb 7s  UA 12/5 neg for microscopic hematuria, concentrated, + proteinuria, + bili, hyaline casts--this after high volume para 12/4, suspect she was severely pre-renal, diuretic held as of  12/6  Ck urine studies, uA, urine sodium and osmol   Should RF decline to the point of needing dialysis, she would be a candidate to bridge her to transplant, assume ongoing candidacy per [facility] (sobriety since July '23)     Lytes/Acid-base:  Mild Hyponatremia:  With ongoing sl drop in Na since admission, likely d/t + ADH from liver failure + dehydration  Acidosis:   Again mild, likely compensatory for resp alk in  the setting of liver failure  Hypokalemia:  With poor intake, diuretics (Dc'd), s/p replacement, on protocol ok for now, but may need to manage off protocol if renal function continues to decline.    BP:  Mostly controlled, however, I  suspect that she has unrecognized HoTN ana after high volume samira, will add scheduled Midodrine with hold parameters.     Cirrhosis:  Decompensated liver dz, in the setting of ETOH  cirrhosis, with ascites, coagulopathy   Reportedly in transplant eval with the [facility], follows with DR Mendez, has appt for EGD onJan , and f/up in May, may need to consider expedited w/up if  ongoing more rapid decline   sober since 7/2023  Ascitic fluid cx from 12/5 negative    On Lactulose    ? Sepsis:  Though only 1/2 cx pos for staph, ascitic c from 12/5 was negative, Vanco Dc'd, pt remains on Rocephin, admission UA not suggestive of UTI    Severe malnutrition;  With near ESLD, Alb 1.8 and BUN <6 on admission, has received some Alb, will plan for 50g BID x 4 doses and trend.   Add protein supplements     H/o hashimotos thyroiditis/Hypothyroidism:  Not on replacement with normal TSH, f/unit(s) per PCP outpt       HPI: Priya Castano is a 28 year old female for whom we have been asked to see for worsening renal failure in the setting of cirrhosis, ? Nearing ESLD.  She is new to our service.  Presented to the ED with abd pain and distention, and SOB.  RSV +.  Has needed  Frequent high volume samira 11/10 (6L) 11/27 (6L), 12/4 (10L) and again scheduled for para today.  Advised that we limit para to 5L per tap, and would benefit from more frequent lower volume taps in the future. She was also on dual diuretics post tap and received Toradol and oral NSAIDs for fever on admission.   Priya was dx with cirrhosi in 7/2023, last ETOH intake 7/27.  RF was mostly stable, but note progressive slow rise in sCR (though remained in the normal range) since that time.  Very low Bun suggestive of severe malnutrition.   "   She is voiding ok, no heavy GI losses documented.  No pain, but does feel \"full\".  Long discussion about MMP, and renal issues.     REVIEW OF SYSTEMS:  See HPI  NO neuro changes, no GI changes, no s/s active bleeding.       Past Medical History:   Diagnosis Date    Alcoholic cirrhosis of liver with ascites (H)     Hypothyroidism     SBP (spontaneous bacterial peritonitis) (H)        I have reviewed this patient's family history and updated it with pertinent information if needed.  Family History   Problem Relation Age of Onset    Thyroid Disease Mother          Social History     Tobacco Use    Smoking status: Never    Smokeless tobacco: Never   Substance Use Topics    Alcohol use: Yes     Comment: 1 bottle wine daily    Drug use: No          No current facility-administered medications on file prior to encounter.  furosemide (LASIX) 20 MG tablet, Take 2 tablets (40 mg) by mouth daily  spironolactone (ALDACTONE) 100 MG tablet, Take 100 mg by mouth daily  furosemide (LASIX) 40 MG tablet, Take 1 tablet (40 mg) by mouth 2 times daily (Patient not taking: Reported on 12/5/2023)          ALLERGIES/SENSITIVITIES:  Allergies   Allergen Reactions    Bee Venom Hives and Shortness Of Breath          PHYSICAL EXAM:  /66 (BP Location: Right arm)   Pulse 111   Temp 98.3  F (36.8  C) (Oral)   Resp 18   Ht 1.588 m (5' 2.5\")   Wt 82.1 kg (181 lb)   SpO2 99%   BMI 32.58 kg/m      Patient Vitals for the past 72 hrs:   Weight   12/07/23 1112 82.1 kg (181 lb)     Body mass index is 32.58 kg/m .    Intake/Output Summary (Last 24 hours) at 12/8/2023 0930  Last data filed at 12/8/2023 0751  Gross per 24 hour   Intake 600 ml   Output 300 ml   Net 300 ml         General - A & O x 3, NAD, supine in bed, pleasant and interactive   HEENT - PERRLA, no  sig scleral icterus  Respiratory - sats good on RA   Cardiovascular - rate conrolled, -120's   Abdomen - soft, very distended, ++++ ascites   Extremities - 2-3+ LE " edema  Integumentary - intact, good turgor, no rash/lesions  Neurologic - grossly intact  Psych:  Judgement intact, affect WNL  :  voiding      Laboratory:  Recent Labs   Lab Test 12/07/23  0630 12/06/23  1424 12/05/23  1328 12/05/23  0517   WBC 15.1*  --   --  16.8*   HGB 7.4* 7.5*   < > 7.0*   MCV 87  --   --  88   *  --   --  160   INR 2.57*  --   --  2.38*    < > = values in this interval not displayed.      Recent Labs   Lab Test 12/08/23  0719 12/07/23  0630   POTASSIUM 3.4 3.5   CHLORIDE 99 101   BUN 8.3 7.4      Recent Labs   Lab Test 12/07/23  0630 12/06/23  0709 12/05/23  0518   ALBUMIN 1.8* 2.3*  --    BILITOTAL 2.7* 4.4*  --    ALT 24 29  --    AST 97* 131*  --    PROTEIN  --   --  50*       Personally reviewed today's laboratory studies      Thank you for involving us in the care of this patient. We will continue to follow along with you.      Elizabeth Hopson, MARINO-BC  [facility]  [phone]

## 2023-12-08 NOTE — PROCEDURES
Radiology Procedure Note    Procedure:  US  guided paracentesis    Findings: 5.0 liters of clear yellow fluid discarded.    Complications:  None    EBL:  Minimal

## 2023-12-08 NOTE — PLAN OF CARE
"  Problem: Adult Inpatient Plan of Care  Goal: Patient-Specific Goal (Individualized)  Description: You can add care plan individualizations to a care plan. Examples of Individualization might be:  \"Parent requests to be called daily at 9am for status\", \"I have a hard time hearing out of my right ear\", or \"Do not touch me to wake me up as it startles  me\".  Outcome: Progressing     Problem: Adult Inpatient Plan of Care  Goal: Absence of Hospital-Acquired Illness or Injury  Outcome: Progressing  Intervention: Identify and Manage Fall Risk  Recent Flowsheet Documentation  Taken 12/8/2023 1000 by Russ Patton RN  Safety Promotion/Fall Prevention:   safety round/check completed   lighting adjusted   clutter free environment maintained   nonskid shoes/slippers when out of bed  Intervention: Prevent Skin Injury  Recent Flowsheet Documentation  Taken 12/8/2023 1000 by Russ Patton RN  Body Position: position changed independently     Problem: Adult Inpatient Plan of Care  Goal: Absence of Hospital-Acquired Illness or Injury  Intervention: Identify and Manage Fall Risk  Recent Flowsheet Documentation  Taken 12/8/2023 1000 by Russ Patton RN  Safety Promotion/Fall Prevention:   safety round/check completed   lighting adjusted   clutter free environment maintained   nonskid shoes/slippers when out of bed     Problem: Infection  Goal: Absence of Infection Signs and Symptoms  Outcome: Progressing  Intervention: Prevent or Manage Infection  Recent Flowsheet Documentation  Taken 12/8/2023 1000 by Russ Patton RN  Isolation Precautions:   contact precautions maintained   droplet precautions maintained     Problem: Infection  Goal: Absence of Infection Signs and Symptoms  Intervention: Prevent or Manage Infection  Recent Flowsheet Documentation  Taken 12/8/2023 1000 by Russ Patton RN  Isolation Precautions:   contact precautions maintained   droplet precautions maintained   Goal Outcome Evaluation:  Pt is alert and oriented x 4. " Able to communicate needs. Received Albumin. Vs is stable. C/o abdominal discomfort. Oxycodone was given x 1 with some help.

## 2023-12-08 NOTE — PLAN OF CARE
"  Problem: Infection  Goal: Absence of Infection Signs and Symptoms  Outcome: Progressing     Problem: Adult Inpatient Plan of Care  Goal: Optimal Comfort and Wellbeing  Outcome: Progressing   Goal Outcome Evaluation:         Pt alert and oriented, VSS on room air. Abdominal pain managed with PRN oxy. Moves independently in the room. On Mg and K protocol with recheck in the AM. Pt bumped forearm on bedside table yesterday and developed hematoma, pt concerned of hard lump forming so xray ordered, \"No evidence of acute fracture or dislocation\" . Call light within reach. Can make her needs known.                 "

## 2023-12-09 PROBLEM — N17.9 AKI (ACUTE KIDNEY INJURY) (H): Status: ACTIVE | Noted: 2023-12-09

## 2023-12-09 PROBLEM — J20.5 ACUTE BRONCHITIS DUE TO RESPIRATORY SYNCYTIAL VIRUS (RSV): Status: ACTIVE | Noted: 2023-12-09

## 2023-12-09 LAB
ALBUMIN MFR UR ELPH: 42.8 MG/DL
ALBUMIN UR-MCNC: 20 MG/DL
ANION GAP SERPL CALCULATED.3IONS-SCNC: 9 MMOL/L (ref 7–15)
APPEARANCE UR: ABNORMAL
BACTERIA #/AREA URNS HPF: ABNORMAL /HPF
BILIRUB UR QL STRIP: NEGATIVE
BUN SERPL-MCNC: 8.3 MG/DL (ref 6–20)
CALCIUM SERPL-MCNC: 8.1 MG/DL (ref 8.6–10)
CHLORIDE SERPL-SCNC: 100 MMOL/L (ref 98–107)
COLOR UR AUTO: YELLOW
CREAT SERPL-MCNC: 2.46 MG/DL (ref 0.51–0.95)
CREAT UR-MCNC: 179.1 MG/DL
CREAT UR-MCNC: 179.1 MG/DL
DEPRECATED HCO3 PLAS-SCNC: 22 MMOL/L (ref 22–29)
EGFRCR SERPLBLD CKD-EPI 2021: 27 ML/MIN/1.73M2
ERYTHROCYTE [DISTWIDTH] IN BLOOD BY AUTOMATED COUNT: 23.3 % (ref 10–15)
FRACT EXCRET NA UR+SERPL-RTO: 0.2 %
GLUCOSE SERPL-MCNC: 87 MG/DL (ref 70–99)
GLUCOSE UR STRIP-MCNC: NEGATIVE MG/DL
HCT VFR BLD AUTO: 25 % (ref 35–47)
HGB BLD-MCNC: 7.8 G/DL (ref 11.7–15.7)
HGB UR QL STRIP: NEGATIVE
HOLD SPECIMEN: NORMAL
HYALINE CASTS: 18 /LPF
KETONES UR STRIP-MCNC: NEGATIVE MG/DL
LEUKOCYTE ESTERASE UR QL STRIP: NEGATIVE
MAGNESIUM SERPL-MCNC: 2 MG/DL (ref 1.7–2.3)
MCH RBC QN AUTO: 27.7 PG (ref 26.5–33)
MCHC RBC AUTO-ENTMCNC: 31.2 G/DL (ref 31.5–36.5)
MCV RBC AUTO: 89 FL (ref 78–100)
MUCOUS THREADS #/AREA URNS LPF: PRESENT /LPF
NITRATE UR QL: NEGATIVE
OSMOLALITY UR: 194 MMOL/KG (ref 100–1200)
PH UR STRIP: 5.5 [PH] (ref 5–7)
PLATELET # BLD AUTO: 105 10E3/UL (ref 150–450)
POTASSIUM SERPL-SCNC: 3.8 MMOL/L (ref 3.4–5.3)
PROT/CREAT 24H UR: 0.24 MG/MG CR (ref 0–0.2)
RBC # BLD AUTO: 2.82 10E6/UL (ref 3.8–5.2)
RBC URINE: 1 /HPF
SODIUM SERPL-SCNC: 131 MMOL/L (ref 135–145)
SODIUM UR-SCNC: 20 MMOL/L
SP GR UR STRIP: 1.01 (ref 1–1.03)
SQUAMOUS EPITHELIAL: 11 /HPF
UROBILINOGEN UR STRIP-MCNC: <2 MG/DL
WBC # BLD AUTO: 13 10E3/UL (ref 4–11)
WBC URINE: 5 /HPF

## 2023-12-09 PROCEDURE — P9047 ALBUMIN (HUMAN), 25%, 50ML: HCPCS | Performed by: NURSE PRACTITIONER

## 2023-12-09 PROCEDURE — 83935 ASSAY OF URINE OSMOLALITY: CPT | Performed by: NURSE PRACTITIONER

## 2023-12-09 PROCEDURE — 81001 URINALYSIS AUTO W/SCOPE: CPT | Performed by: NURSE PRACTITIONER

## 2023-12-09 PROCEDURE — 83735 ASSAY OF MAGNESIUM: CPT | Performed by: INTERNAL MEDICINE

## 2023-12-09 PROCEDURE — 80048 BASIC METABOLIC PNL TOTAL CA: CPT | Performed by: INTERNAL MEDICINE

## 2023-12-09 PROCEDURE — 120N000001 HC R&B MED SURG/OB

## 2023-12-09 PROCEDURE — 250N000013 HC RX MED GY IP 250 OP 250 PS 637: Performed by: INTERNAL MEDICINE

## 2023-12-09 PROCEDURE — 84156 ASSAY OF PROTEIN URINE: CPT | Performed by: NURSE PRACTITIONER

## 2023-12-09 PROCEDURE — 250N000011 HC RX IP 250 OP 636: Performed by: NURSE PRACTITIONER

## 2023-12-09 PROCEDURE — 85027 COMPLETE CBC AUTOMATED: CPT | Performed by: HOSPITALIST

## 2023-12-09 PROCEDURE — 99233 SBSQ HOSP IP/OBS HIGH 50: CPT | Performed by: INTERNAL MEDICINE

## 2023-12-09 PROCEDURE — 250N000013 HC RX MED GY IP 250 OP 250 PS 637: Performed by: NURSE PRACTITIONER

## 2023-12-09 PROCEDURE — 84300 ASSAY OF URINE SODIUM: CPT | Performed by: INTERNAL MEDICINE

## 2023-12-09 PROCEDURE — 99232 SBSQ HOSP IP/OBS MODERATE 35: CPT | Performed by: HOSPITALIST

## 2023-12-09 PROCEDURE — 36415 COLL VENOUS BLD VENIPUNCTURE: CPT | Performed by: INTERNAL MEDICINE

## 2023-12-09 RX ADMIN — LORAZEPAM 0.5 MG: 0.5 TABLET ORAL at 12:38

## 2023-12-09 RX ADMIN — ALBUMIN HUMAN 50 G: 0.25 SOLUTION INTRAVENOUS at 20:23

## 2023-12-09 RX ADMIN — OXYCODONE HYDROCHLORIDE 5 MG: 5 TABLET ORAL at 21:40

## 2023-12-09 RX ADMIN — LACTULOSE 10 G: 10 SOLUTION ORAL at 09:14

## 2023-12-09 RX ADMIN — CARBIDOPA AND LEVODOPA 2.5 MG: 50; 200 TABLET, EXTENDED RELEASE ORAL at 16:19

## 2023-12-09 RX ADMIN — Medication 5 MG: at 09:14

## 2023-12-09 RX ADMIN — OXYCODONE HYDROCHLORIDE 5 MG: 5 TABLET ORAL at 09:14

## 2023-12-09 RX ADMIN — OXYCODONE HYDROCHLORIDE 5 MG: 5 TABLET ORAL at 16:19

## 2023-12-09 RX ADMIN — LORAZEPAM 0.5 MG: 0.5 TABLET ORAL at 01:48

## 2023-12-09 RX ADMIN — ALBUMIN HUMAN 50 G: 0.25 SOLUTION INTRAVENOUS at 09:13

## 2023-12-09 ASSESSMENT — ACTIVITIES OF DAILY LIVING (ADL)
ADLS_ACUITY_SCORE: 20

## 2023-12-09 NOTE — PLAN OF CARE
"  Problem: Adult Inpatient Plan of Care  Goal: Absence of Hospital-Acquired Illness or Injury  Outcome: Progressing  Intervention: Identify and Manage Fall Risk  Recent Flowsheet Documentation  Taken 12/9/2023 1200 by Russ Patton RN  Safety Promotion/Fall Prevention:   safety round/check completed   lighting adjusted   nonskid shoes/slippers when out of bed   clutter free environment maintained  Intervention: Prevent Skin Injury  Recent Flowsheet Documentation  Taken 12/9/2023 1200 by Russ Patton RN  Body Position: position changed independently  Intervention: Prevent Infection  Recent Flowsheet Documentation  Taken 12/9/2023 1200 by Russ Patton RN  Infection Prevention: hand hygiene promoted     Problem: Adult Inpatient Plan of Care  Goal: Patient-Specific Goal (Individualized)  Description: You can add care plan individualizations to a care plan. Examples of Individualization might be:  \"Parent requests to be called daily at 9am for status\", \"I have a hard time hearing out of my right ear\", or \"Do not touch me to wake me up as it startles  me\".  Outcome: Progressing     Problem: Adult Inpatient Plan of Care  Goal: Plan of Care Review  Description: The Plan of Care Review/Shift note should be completed every shift.  The Outcome Evaluation is a brief statement about your assessment that the patient is improving, declining, or no change.  This information will be displayed automatically on your shift  note.  Outcome: Progressing     Problem: Adult Inpatient Plan of Care  Goal: Absence of Hospital-Acquired Illness or Injury  Intervention: Prevent Skin Injury  Recent Flowsheet Documentation  Taken 12/9/2023 1200 by Russ Patton RN  Body Position: position changed independently     Problem: Adult Inpatient Plan of Care  Goal: Optimal Comfort and Wellbeing  Outcome: Progressing     Problem: Infection  Goal: Absence of Infection Signs and Symptoms  Outcome: Progressing  Intervention: Prevent or Manage Infection  Recent " Flowsheet Documentation  Taken 12/9/2023 1200 by Russ Patton, RN  Isolation Precautions:   contact precautions maintained   droplet precautions maintained     Problem: Electrolyte Imbalance  Goal: Electrolyte Balance  Outcome: Progressing     Problem: Fluid Volume Excess  Goal: Fluid Balance  Outcome: Progressing     Problem: Pain Acute  Goal: Optimal Pain Control and Function  Outcome: Progressing   Goal Outcome Evaluation:  Vs is stable with albumin admin. C/O pain. Requests oxycodone for abdominal pain and ativan. Able to communicate needs. Waiting for transfer to transplant unit at [place].

## 2023-12-09 NOTE — PROGRESS NOTES
[facility]    Medicine Progress Note - Hospitalist Service    Date of Admission:  12/4/2023    Assessment & Plan                Priya Castano is a 28 year old female with known alcoholic cirrhosis, sober since 7/2023, presented with abdominal pain and distention, and is status post large volume paracentesis.  She was initially admitted for sepsis and RSV but then  developed acute renal failure after the paracentesis.  IV albumin ordered on 12/7.  Despite albumin creatinine continues to worsen. She has been accepted to [facility] and is awaiting bed availability. Hospital Day: 6       Sepsis secondary to RSV, SBP ruled out  -RSV positive on admission  -Ascites fluid cultures 12/5: NGTD  -Initially treated with Vanco and IV Rocephin, now off abx as no infectious source identified  -MSSA x2 strains from single bottle of blood culture 12/5- presume contaminants, subsequent cultures no growth and appears was drawn off a 7 hours old PIV (collection policy not followed)  -Supportive cares for RSV      Acute anemia on chronic disease requiring transfusion  -Has had severe anemia related to cirrhosis in the recent past, received a unit of PRBCs earlier this month through transplant physician on 11/7  -Hgb 5.5 on admit  -received 3 units pRBCs since admit  -Will monitor and replace for hemoglobin less than 7  -Fecal occult is positive  --needs outpatient EGD to rule out varices.    Abdominal distention secondary to recurrent ascites in the setting of decompensated cirrhosis  Hepatic encephalopathy  MELD-Na score of 31.  Awaiting bed at the  for transfer to liver transplant team under Dr. Luci Davis as consulting hepatologist.  -ED removed 3.5 L and sent for testing  -Repeat paracentesis 12/8 for 5L (radiologist had been instructed to take no more than 5L)  -still significant abdominal distention, but feels comfortable, not need repeat paracentesis today  - IV albumin given with nephrology  guidance  -Supportive management for underlying pain  -Initially treated with spironolactone and IV Lasix but now held due to acute renal failure  -----Asterixis improved with lactulose  -strict intake and output     Hyponatremia and acute renal failure likely due to dehydration from paracentesis and overdiuresis.  Hepatorenal syndrome suspected  -- Hold diuretics including Lasix, spironolactone. avoid nephrotoxic substances, NSAIDs  -Received 50 g of IV albumin on 12/7 but creatinine continued to worsen and therefore nephrology consulted.  Currently on 50 g of IV albumin twice daily.  --Midodrine started by nephrology.    Alcohol related cirrhosis  -Follows with Dr. Vanessa [facility] hepatology/liver transplant team outpatient  -Awaiting transfer to the  as mentioned above  -Has been sober since 7/27/2023     Acquired hypothyroidism due to Hashimoto's thyroiditis  -Has been on thyroid replacement in the past  Since thyroid function are within normal limits,  levothyroxine that was started on this admission by previous hospitalist has now been stopped.  Repeat TSH in 3 months.     Hyperbilirubinemia in the setting of alcoholic cirrhosis  -Trend serum bilirubin     Elevated INR the setting of liver disease  -Ordered course of vitamin K 5 mg daily due to spontaneous development of hematoma on the right forearm on 12/7     Metabolic acidosis  -Likely due to acute renal failure.    Hypokalemia resolved.     Spontaneous hematoma of the right forearm on 12/7  -- Patient denies any trauma  -- X-ray ordered and negative for fracture or dislocation  -- INR is 2.42.  On course of vitamin K       DVT Prophylaxis: Low risk. Pharmacologic prophylaxis contraindicated due to coagulopathy  Diet: Room Service  Snacks/Supplements Adult: Ensure Max Protein (bariatric); With Meals  Combination Diet Regular Diet Adult; 2 gm NA Diet    Short Catheter: Not present  Lines: None     Cardiac Monitoring: None  Code Status: Full Code   "    Clinically Significant Risk Factors         # Hyponatremia: Lowest Na = 129 mmol/L in last 2 days, will monitor as appropriate      # Hypoalbuminemia: Lowest albumin = 1.8 g/dL at 12/7/2023  6:30 AM, will monitor as appropriate  # Coagulation Defect: INR = 2.42 (Ref range: 0.85 - 1.15) and/or PTT = N/A, will monitor for bleeding  # Thrombocytopenia: Lowest platelets = 105 in last 2 days, will monitor for bleeding  # Acute Kidney Injury, unspecified: based on a >150% or 0.3 mg/dL increase in last creatinine compared to past 90 day average, will monitor renal function         # Obesity: Estimated body mass index is 33.09 kg/m  as calculated from the following:    Height as of this encounter: 1.588 m (5' 2.5\").    Weight as of this encounter: 83.4 kg (183 lb 13.8 oz).             Disposition Plan   Disposition: [facility] when bed     Expected Discharge Date: 12/10/2023        Discharge Comments: monitoring labs  blood cultures pending  Bed request at [facility]     Medically ready to discharge today: No     The patient's care was discussed with the Patient. Offered to meet with her mom and patient was to notify RN when her mom present, but I never received word.    Mandy iKng MD  Hospitalist Service  [facility]  Securely message with [employer] (more info)  Text page via [employer] Paging/Directory   ______________________________________________________________________      Physical Exam   Vital Signs: Temp: 99.3  F (37.4  C) Temp src: Oral BP: 108/61 Pulse: 111   Resp: 17 SpO2: 99 % O2 Device: None (Room air)    Weight: 183 lbs 13.82 oz  General: in no apparent distress, non-toxic, and alert female lying in hospital bed oriented x3  HEENT: Head normocephalic atraumatic, oral mucosa moist. Sclerae slightly icteric  CV: Regular rhythm, normal rate, no murmurs  Resp: No wheezes, no rales or rhonchi, no focal consolidations  GI: Belly soft, distended with ascites but not rigid, nontender, bowel sounds " present  Skin: No rashes or lesions  Extremities: Trace ankle edema bilaterally  Psych: Normal affect, mildly anxious mood  Neuro: Grossly normal        Medical Decision Making               Data   Recent Results (from the past 12 hour(s))   Basic metabolic panel    Collection Time: 12/09/23  7:19 AM   Result Value Ref Range    Sodium 131 (L) 135 - 145 mmol/L    Potassium 3.8 3.4 - 5.3 mmol/L    Chloride 100 98 - 107 mmol/L    Carbon Dioxide (CO2) 22 22 - 29 mmol/L    Anion Gap 9 7 - 15 mmol/L    Urea Nitrogen 8.3 6.0 - 20.0 mg/dL    Creatinine 2.46 (H) 0.51 - 0.95 mg/dL    GFR Estimate 27 (L) >60 mL/min/1.73m2    Calcium 8.1 (L) 8.6 - 10.0 mg/dL    Glucose 87 70 - 99 mg/dL   Magnesium    Collection Time: 12/09/23  7:19 AM   Result Value Ref Range    Magnesium 2.0 1.7 - 2.3 mg/dL   Extra Purple Top Tube    Collection Time: 12/09/23  7:19 AM   Result Value Ref Range    Hold Specimen JIC    CBC with platelets    Collection Time: 12/09/23  7:19 AM   Result Value Ref Range    WBC Count 13.0 (H) 4.0 - 11.0 10e3/uL    RBC Count 2.82 (L) 3.80 - 5.20 10e6/uL    Hemoglobin 7.8 (L) 11.7 - 15.7 g/dL    Hematocrit 25.0 (L) 35.0 - 47.0 %    MCV 89 78 - 100 fL    MCH 27.7 26.5 - 33.0 pg    MCHC 31.2 (L) 31.5 - 36.5 g/dL    RDW 23.3 (H) 10.0 - 15.0 %    Platelet Count 105 (L) 150 - 450 10e3/uL   UA with Microscopic    Collection Time: 12/09/23 12:30 PM   Result Value Ref Range    Color Urine Yellow Colorless, Straw, Light Yellow, Yellow    Appearance Urine Turbid (A) Clear    Glucose Urine Negative Negative mg/dL    Bilirubin Urine Negative Negative    Ketones Urine Negative Negative mg/dL    Specific Gravity Urine 1.013 1.001 - 1.030    Blood Urine Negative Negative    pH Urine 5.5 5.0 - 7.0    Protein Albumin Urine 20 (A) Negative mg/dL    Urobilinogen Urine <2.0 <2.0 mg/dL    Nitrite Urine Negative Negative    Leukocyte Esterase Urine Negative Negative    Bacteria Urine Few (A) None Seen /HPF    Mucus Urine Present (A) None  Seen /LPF    RBC Urine 1 <=2 /HPF    WBC Urine 5 <=5 /HPF    Squamous Epithelials Urine 11 (H) <=1 /HPF    Hyaline Casts Urine 18 (H) <=2 /LPF   Osmolality urine    Collection Time: 12/09/23 12:31 PM   Result Value Ref Range    Osmolality Urine 194 100 - 1,200 mmol/kg   Protein  random urine    Collection Time: 12/09/23 12:31 PM   Result Value Ref Range    Total Protein Urine mg/dL 42.8   mg/dL    Total Protein Urine mg/mg Creat 0.24 (H) 0.00 - 0.20 mg/mg Cr    Creatinine Urine mg/dL 179.1 mg/dL   Fractional Excretion of Sodium    Collection Time: 12/09/23 12:31 PM   Result Value Ref Range    Creatinine Urine mg/dL 179.1 mg/dL    Sodium Urine mmol/L 20 mmol/L    %FENA 0.2 %     Interval History     Patient states doing much better today.  Slept better last night.  Abdomen feels less distended.  Urinating well.  Had 2 loose bowel movements yesterday.  Appetite has improved.  Overall she is feeling much better.  Creatinine continues to rise but not as steeply as before.  [facility], patient seems somewhat surprised by discussion of this plan, had a lot of questions [facility] (I confirmed with Dr Roque- this was all discussed with patient yesterday and should not be new information for her- suspect that she was hoping for different opinion about the necessity of this transfer, which I did not give).  She would like me to meet with her mom later today and discuss about things which I am happy to do.  Nurse can notify me when the time for her mom's visit is known.  Not ready for discharge pending evaluation by GI specialist and [facility] when bed [facility].

## 2023-12-09 NOTE — PROGRESS NOTES
RENAL PROGRESS NOTE      ASSESSMENT & PLAN:   This is 28-year-old female with past medical history significant for end-stage renal disease secondary to alcohol abuse complicated by recurrent large-volume ascites requiring paracenteses every 1 to 2 weeks who was admitted to [facility] on December 5, 2023 for evaluation of abdominal pain and distention.  Nephrology service is following for worsening of renal function.    Non oliguric Acute Kidney Insufficiency:  What appears to be modestly  progressive renal failure since 7/2023 with incremental rise in sCR from 0.4---->0.8 over the last 5 months, likely multifactorial--> but most c/w prerenal hemodynamics  with high volume/samira and ongoing diuretics and no Albumin at that time (Furosemide  and Aldactone last dosed 12/6--> para 12/4) and possibly unrecognized or relative HoTN; she also received Toradol 12/5 and oral NSAIDs post para --both have been DC'd), less likely Vanco tox (has been Dc'd regardless; in addition anemia with admit hgb 7s  HRS on differential as well.  FENA is 0.2%.  UA 12/5 neg for microscopic hematuria, concentrated, + proteinuria, + bili, hyaline casts--this after high volume para 12/4, suspect she was severely pre-renal, diuretic held as of  12/6.  Repeat UA 12/09 with 20 mg/dL of albumin and 18 hyaline casts.  Patient will proteinuria of 0.24 g/g..  Started on albumin challenge 1 g/kgx48 hours  Today serum creatinine is trending up from 2.3 to 2.46 mg/dL.  Recs:  Complete albumin challenge.  Should RF decline to the point of needing dialysis, she would be a candidate to bridge her to transplant, assume ongoing candidacy per [facility] (sobriety since July '23).     Lytes/Acid-base:  Mild Hyponatremia:likely d/t + ADH from liver failure + dehydration.  Patient serum sodium has been fluctuating between 129-1 33 in the last 4 weeks.  Today serum sodium is trended up from 129-131.  Trend.  Acidosis: Again mild, likely compensatory for resp  alk in the setting of liver failure.  Promestriene up to 22 this morning.  Hypokalemia:With poor intake, diuretics (Dc'd), s/p replacement, on protocol ok for now, but may need to manage off protocol if renal function continues to decline.     BP:  Mostly controlled, however, I  suspect that she has unrecognized HoTN ana after high volume paracentesis.  Started on scheduled Midodrine with hold parameters.   Monitor blood pressure closely, avoid hypo and hypertension.     Cirrhosis:  Decompensated liver dz, in the setting of ETOH  cirrhosis, with ascites, coagulopathy   Reportedly in transplant eval with the [facility], follows with DR Mendez, has appt for EGD onJan , and f/up in May, may need to consider expedited w/up if  ongoing more rapid decline   sober since 7/2023  Ascitic fluid cx from 12/5 negative   On Lactulose     ? Sepsis:  Though only 1/2 cx pos for staph, ascitic c from 12/5 was negative, Vanco Dc'd, pt remains on Rocephin, admission UA not suggestive of UTI     Severe malnutrition;  With near ESLD, Alb 1.8 and BUN <6 on admission, has received some Alb, will plan for 50g BID x 4 doses and trend.   Started on protein supplements      H/o Hashimotos thyroiditis/Hypothyroidism:  Not on replacement with normal TSH, f/unit(s) per PCP outpt     Addendum:  I spoke with patient's mother Pema over the phone at 16:15. All questions answered to satisfaction.    We will follow.      SUBJECTIVE:    Patient underwent ultrasound-guided paracentesis, 6 L removed.  No acute issues overnight.  Patient stated that she is feeling better, reports improved cough , lower extremity edema and abdominal distention.  Tolerating diet.  Tolerating lactulose.  Voiding without issues.  Patient denies: fever, chills, dizziness, shortness of breath , chest pain, palpitations, orthopnea, nausea, vomiting, abdominal pain, dysuria, urinary frequency, urgency, hematuria, rash.  Updated on labs.  All questions  answered.      OBJECTIVE:  Physical Exam   Temp: 99.1  F (37.3  C) Temp src: Oral BP: 119/57 Pulse: 112   Resp: 18 SpO2: 96 % O2 Device: None (Room air)    Vitals:    12/08/23 1055 12/08/23 1500 12/09/23 0748   Weight: 86.1 kg (189 lb 14.4 oz) 81.3 kg (179 lb 4.8 oz) 83.4 kg (183 lb 13.8 oz)     Vital Signs with Ranges  Temp:  [98.4  F (36.9  C)-99.1  F (37.3  C)] 99.1  F (37.3  C)  Pulse:  [104-115] 112  Resp:  [16-18] 18  BP: (106-128)/(54-72) 119/57  SpO2:  [94 %-99 %] 96 %  I/O last 3 completed shifts:  In: 120 [P.O.:120]  Out: 150 [Urine:150]    @TMAXR(24)@    Patient Vitals for the past 72 hrs:   Weight   12/09/23 0748 83.4 kg (183 lb 13.8 oz)   12/08/23 1500 81.3 kg (179 lb 4.8 oz)   12/08/23 1055 86.1 kg (189 lb 14.4 oz)   12/07/23 1112 82.1 kg (181 lb)   [unfilled]    PHYSICAL EXAM:  General - Alert and oriented x3, appears comfortable, NAD  Cardiovascular - Regular rate and rhythm, no rub  Respiratory - Clear to auscultation bilaterally, no crackles or wheezes  Abd: BS present, no guarding or pain with palpation, less distended, soft, + ascites  Extremities - improved b/l lower extremity edema bilaterally  Skin: dry, intact, no rash, good turgor  Neuro:  Grossly intact, no focal deficits  MSK:  Grossly intact  Psych:  Normal affect    LABORATORY STUDIES:     Recent Labs   Lab 12/09/23  0719 12/07/23  0630 12/06/23  1424 12/05/23  1328 12/05/23  0517 12/04/23  1806   WBC 13.0* 15.1*  --   --  16.8* 16.2*   RBC 2.82* 2.67*  --   --  2.48* 2.01*   HGB 7.8* 7.4* 7.5* 7.9* 7.0* 5.5*   HCT 25.0* 23.2*  --   --  21.9* 18.0*   * 111*  --   --  160 192       Basic Metabolic Panel:  Recent Labs   Lab 12/09/23  0719 12/08/23  1354 12/08/23  0719 12/07/23  0630 12/06/23  2052 12/06/23  1424 12/06/23  0709 12/05/23  0517 12/04/23  1806   *  --  129* 130*  --   --  132* 131* 132*   POTASSIUM 3.8 3.7 3.4 3.5 3.6 3.2* 3.0* 4.0 3.7   CHLORIDE 100  --  99 101  --   --  101 101 101   CO2 22  --  21* 21*  --   --   24 19* 18*   BUN 8.3  --  8.3 7.4  --   --  5.4* 5.5* 5.6*   CR 2.46*  --  2.30* 1.87*  --   --  1.11* 0.80 0.67   GLC 87  --  95 97  --   --  80 107* 107*   JULIAN 8.1*  --  7.3* 6.9*  --   --  7.3* 7.1* 7.4*       INR  Recent Labs   Lab 12/08/23  1354 12/07/23  0630 12/05/23  0517 12/05/23  0116   INR 2.42* 2.57* 2.38* 2.34*        Recent Labs   Lab Test 12/09/23  0719 12/08/23  1354 12/07/23  0630   INR  --  2.42* 2.57*   WBC 13.0*  --  15.1*   HGB 7.8*  --  7.4*   *  --  111*       Personally reviewed current labs     ~ 50 Minutes today spent in exam, POC, education regarding renal disease and management, counseling and/or discussion with patient's care team.      Melissa Caruso MD  [facility]  [address], [address]  [place] [address]  Phone# [phone]  Fax# [phone]

## 2023-12-09 NOTE — PLAN OF CARE
Goal Outcome Evaluation:      Plan of Care Reviewed With: patient        Patient denies pain. Experiencing anxiety PRN ativan ordered and given. Appetite good. Family in room. VSS

## 2023-12-09 NOTE — PLAN OF CARE
Problem: Adult Inpatient Plan of Care  Goal: Absence of Hospital-Acquired Illness or Injury  Intervention: Identify and Manage Fall Risk  Recent Flowsheet Documentation  Taken 12/9/2023 0019 by Barry Vitale RN  Safety Promotion/Fall Prevention: safety round/check completed  Taken 12/8/2023 1959 by Barry Vitale RN  Safety Promotion/Fall Prevention: safety round/check completed     Problem: Infection  Goal: Absence of Infection Signs and Symptoms  Intervention: Prevent or Manage Infection  Recent Flowsheet Documentation  Taken 12/9/2023 0019 by Barry Vitale RN  Isolation Precautions: contact precautions maintained  Taken 12/8/2023 1959 by Barry Vitale RN  Isolation Precautions: contact precautions maintained     Problem: Pain Acute  Goal: Optimal Pain Control and Function  Intervention: Prevent or Manage Pain  Recent Flowsheet Documentation  Taken 12/9/2023 0019 by Barry Vitale RN  Medication Review/Management: medications reviewed  Taken 12/8/2023 1959 by Barry Vitale RN  Medication Review/Management: medications reviewed   Goal Outcome Evaluation:       Patient managed abdominal pain with prn oxycodone 5 mg and requested and received prn Ativan 0.5 mg at 0148 for increased anxiety. Patient appeared alert and pleasant. Received IV Albumin as ordered. Patient independent in the room. VSS.

## 2023-12-10 ENCOUNTER — HOSPITAL ENCOUNTER (INPATIENT)
Facility: CLINIC | Age: 28
LOS: 4 days | Discharge: HOME | DRG: 432 | End: 2023-12-14
Attending: STUDENT IN AN ORGANIZED HEALTH CARE EDUCATION/TRAINING PROGRAM | Admitting: STUDENT IN AN ORGANIZED HEALTH CARE EDUCATION/TRAINING PROGRAM

## 2023-12-10 VITALS
TEMPERATURE: 98.2 F | DIASTOLIC BLOOD PRESSURE: 69 MMHG | WEIGHT: 181.7 LBS | HEART RATE: 110 BPM | RESPIRATION RATE: 20 BRPM | BODY MASS INDEX: 32.2 KG/M2 | HEIGHT: 63 IN | OXYGEN SATURATION: 99 % | SYSTOLIC BLOOD PRESSURE: 122 MMHG

## 2023-12-10 DIAGNOSIS — F43.22 ADJUSTMENT DISORDER WITH ANXIOUS MOOD: ICD-10-CM

## 2023-12-10 DIAGNOSIS — B95.61 MSSA BACTEREMIA: ICD-10-CM

## 2023-12-10 DIAGNOSIS — R78.81 MSSA BACTEREMIA: ICD-10-CM

## 2023-12-10 DIAGNOSIS — D64.9 ANEMIA, UNSPECIFIED TYPE: ICD-10-CM

## 2023-12-10 DIAGNOSIS — F10.21 SEVERE ALCOHOL DEPENDENCE IN EARLY REMISSION (H): ICD-10-CM

## 2023-12-10 DIAGNOSIS — K70.31 ALCOHOLIC CIRRHOSIS OF LIVER WITH ASCITES (H): Primary | ICD-10-CM

## 2023-12-10 PROBLEM — K74.60 CIRRHOSIS OF LIVER (H): Status: ACTIVE | Noted: 2023-12-10

## 2023-12-10 LAB
ALBUMIN SERPL BCG-MCNC: 3.6 G/DL (ref 3.5–5.2)
ANION GAP SERPL CALCULATED.3IONS-SCNC: 10 MMOL/L (ref 7–15)
APTT PPP: 50 SECONDS (ref 22–38)
BACTERIA BLD CULT: NO GROWTH
BACTERIA FLD CULT: NO GROWTH
BUN SERPL-MCNC: 8.6 MG/DL (ref 6–20)
CALCIUM SERPL-MCNC: 8.4 MG/DL (ref 8.6–10)
CHLORIDE SERPL-SCNC: 100 MMOL/L (ref 98–107)
CREAT SERPL-MCNC: 2.56 MG/DL (ref 0.51–0.95)
DEPRECATED HCO3 PLAS-SCNC: 22 MMOL/L (ref 22–29)
EGFRCR SERPLBLD CKD-EPI 2021: 25 ML/MIN/1.73M2
ERYTHROCYTE [DISTWIDTH] IN BLOOD BY AUTOMATED COUNT: 23.3 % (ref 10–15)
FERRITIN SERPL-MCNC: 70 NG/ML (ref 6–175)
FIBRINOGEN PPP-MCNC: 80 MG/DL (ref 170–490)
GLUCOSE SERPL-MCNC: 100 MG/DL (ref 70–99)
GRAM STAIN RESULT: NORMAL
GRAM STAIN RESULT: NORMAL
HCT VFR BLD AUTO: 22.8 % (ref 35–47)
HGB BLD-MCNC: 7.2 G/DL (ref 11.7–15.7)
INR PPP: 2.42 (ref 0.85–1.15)
LAB DIRECTOR COMMENTS: NORMAL
LAB DIRECTOR DISCLAIMER: NORMAL
LAB DIRECTOR INTERPRETATION: NORMAL
LAB DIRECTOR METHODOLOGY: NORMAL
LAB DIRECTOR RESULTS: NORMAL
MAGNESIUM SERPL-MCNC: 2 MG/DL (ref 1.7–2.3)
MCH RBC QN AUTO: 28.1 PG (ref 26.5–33)
MCHC RBC AUTO-ENTMCNC: 31.6 G/DL (ref 31.5–36.5)
MCV RBC AUTO: 89 FL (ref 78–100)
PLATELET # BLD AUTO: 110 10E3/UL (ref 150–450)
POTASSIUM SERPL-SCNC: 3.9 MMOL/L (ref 3.4–5.3)
RBC # BLD AUTO: 2.56 10E6/UL (ref 3.8–5.2)
SODIUM SERPL-SCNC: 132 MMOL/L (ref 135–145)
SPECIMEN DESCRIPTION: NORMAL
WBC # BLD AUTO: 12.6 10E3/UL (ref 4–11)

## 2023-12-10 PROCEDURE — 85610 PROTHROMBIN TIME: CPT

## 2023-12-10 PROCEDURE — 99207 PR APP CREDIT; MD BILLING SHARED VISIT: CPT

## 2023-12-10 PROCEDURE — 99223 1ST HOSP IP/OBS HIGH 75: CPT | Mod: FS | Performed by: STUDENT IN AN ORGANIZED HEALTH CARE EDUCATION/TRAINING PROGRAM

## 2023-12-10 PROCEDURE — 250N000013 HC RX MED GY IP 250 OP 250 PS 637: Performed by: INTERNAL MEDICINE

## 2023-12-10 PROCEDURE — 99207 PR APP CREDIT; MD BILLING SHARED VISIT: CPT | Performed by: HOSPITALIST

## 2023-12-10 PROCEDURE — 83735 ASSAY OF MAGNESIUM: CPT | Performed by: HOSPITALIST

## 2023-12-10 PROCEDURE — P9047 ALBUMIN (HUMAN), 25%, 50ML: HCPCS | Performed by: NURSE PRACTITIONER

## 2023-12-10 PROCEDURE — G0452 MOLECULAR PATHOLOGY INTERPR: HCPCS | Mod: 26 | Performed by: STUDENT IN AN ORGANIZED HEALTH CARE EDUCATION/TRAINING PROGRAM

## 2023-12-10 PROCEDURE — 82728 ASSAY OF FERRITIN: CPT

## 2023-12-10 PROCEDURE — 250N000011 HC RX IP 250 OP 636: Performed by: NURSE PRACTITIONER

## 2023-12-10 PROCEDURE — 82040 ASSAY OF SERUM ALBUMIN: CPT | Performed by: INTERNAL MEDICINE

## 2023-12-10 PROCEDURE — 82390 ASSAY OF CERULOPLASMIN: CPT

## 2023-12-10 PROCEDURE — 80048 BASIC METABOLIC PNL TOTAL CA: CPT | Performed by: INTERNAL MEDICINE

## 2023-12-10 PROCEDURE — 120N000002 HC R&B MED SURG/OB UMMC

## 2023-12-10 PROCEDURE — 82103 ALPHA-1-ANTITRYPSIN TOTAL: CPT

## 2023-12-10 PROCEDURE — 85384 FIBRINOGEN ACTIVITY: CPT

## 2023-12-10 PROCEDURE — 99232 SBSQ HOSP IP/OBS MODERATE 35: CPT | Performed by: INTERNAL MEDICINE

## 2023-12-10 PROCEDURE — 250N000013 HC RX MED GY IP 250 OP 250 PS 637

## 2023-12-10 PROCEDURE — 36415 COLL VENOUS BLD VENIPUNCTURE: CPT

## 2023-12-10 PROCEDURE — 85730 THROMBOPLASTIN TIME PARTIAL: CPT

## 2023-12-10 PROCEDURE — 36415 COLL VENOUS BLD VENIPUNCTURE: CPT | Performed by: HOSPITALIST

## 2023-12-10 PROCEDURE — 81256 HFE GENE: CPT

## 2023-12-10 PROCEDURE — 85027 COMPLETE CBC AUTOMATED: CPT | Performed by: HOSPITALIST

## 2023-12-10 RX ORDER — LORAZEPAM 0.5 MG/1
0.5 TABLET ORAL EVERY 4 HOURS PRN
Status: DISCONTINUED | OUTPATIENT
Start: 2023-12-10 | End: 2023-12-11

## 2023-12-10 RX ORDER — GUAIFENESIN 200 MG/10ML
15 LIQUID ORAL EVERY 6 HOURS PRN
Status: DISCONTINUED | OUTPATIENT
Start: 2023-12-10 | End: 2023-12-10 | Stop reason: HOSPADM

## 2023-12-10 RX ORDER — ONDANSETRON 2 MG/ML
4 INJECTION INTRAMUSCULAR; INTRAVENOUS EVERY 6 HOURS PRN
Status: DISCONTINUED | OUTPATIENT
Start: 2023-12-10 | End: 2023-12-14 | Stop reason: HOSPADM

## 2023-12-10 RX ORDER — BENZONATATE 100 MG/1
100 CAPSULE ORAL 3 TIMES DAILY PRN
Status: DISCONTINUED | OUTPATIENT
Start: 2023-12-10 | End: 2023-12-10 | Stop reason: HOSPADM

## 2023-12-10 RX ORDER — AMOXICILLIN 250 MG
1 CAPSULE ORAL 2 TIMES DAILY PRN
Status: DISCONTINUED | OUTPATIENT
Start: 2023-12-10 | End: 2023-12-14 | Stop reason: HOSPADM

## 2023-12-10 RX ORDER — CALCIUM CARBONATE 500 MG/1
1000 TABLET, CHEWABLE ORAL 4 TIMES DAILY PRN
Status: DISCONTINUED | OUTPATIENT
Start: 2023-12-10 | End: 2023-12-14 | Stop reason: HOSPADM

## 2023-12-10 RX ORDER — GUAIFENESIN 200 MG/10ML
200 LIQUID ORAL EVERY 4 HOURS PRN
Status: DISCONTINUED | OUTPATIENT
Start: 2023-12-10 | End: 2023-12-14 | Stop reason: HOSPADM

## 2023-12-10 RX ORDER — LIDOCAINE 40 MG/G
CREAM TOPICAL
Status: DISCONTINUED | OUTPATIENT
Start: 2023-12-10 | End: 2023-12-14

## 2023-12-10 RX ORDER — AMOXICILLIN 250 MG
2 CAPSULE ORAL 2 TIMES DAILY PRN
Status: DISCONTINUED | OUTPATIENT
Start: 2023-12-10 | End: 2023-12-14 | Stop reason: HOSPADM

## 2023-12-10 RX ORDER — ONDANSETRON 4 MG/1
4 TABLET, ORALLY DISINTEGRATING ORAL EVERY 6 HOURS PRN
Status: DISCONTINUED | OUTPATIENT
Start: 2023-12-10 | End: 2023-12-14 | Stop reason: HOSPADM

## 2023-12-10 RX ADMIN — OXYCODONE HYDROCHLORIDE 5 MG: 5 TABLET ORAL at 15:45

## 2023-12-10 RX ADMIN — LORAZEPAM 0.5 MG: 0.5 TABLET ORAL at 01:39

## 2023-12-10 RX ADMIN — LORAZEPAM 0.5 MG: 0.5 TABLET ORAL at 21:13

## 2023-12-10 RX ADMIN — LORAZEPAM 0.5 MG: 0.5 TABLET ORAL at 13:57

## 2023-12-10 RX ADMIN — LACTULOSE 10 G: 10 SOLUTION ORAL at 08:24

## 2023-12-10 RX ADMIN — ALBUMIN HUMAN 50 G: 0.25 SOLUTION INTRAVENOUS at 08:26

## 2023-12-10 RX ADMIN — Medication 5 MG: at 08:23

## 2023-12-10 RX ADMIN — OXYCODONE HYDROCHLORIDE 5 MG: 5 TABLET ORAL at 08:23

## 2023-12-10 ASSESSMENT — ACTIVITIES OF DAILY LIVING (ADL)
ADLS_ACUITY_SCORE: 20

## 2023-12-10 NOTE — PROGRESS NOTES
Patient states doing well today.  2 soft bowel movements yesterday and 1 so far today.  Urinating well.  Appetite is good.  She was wondering if she should restrict her fluid intake, and I told her this is likely not necessary unless directed by nephrology, but also not to try to push fluids by any means, drink to thirst.  Requested cough syrup which I did order.  Awaiting bed at [place].  Renal labs continue to worsen slightly today.  Not ready for discharge.      To [facility] later today when bed available    Mandy King MD  [facility] Medicine Service

## 2023-12-10 NOTE — PLAN OF CARE
Called report to Candie on Unit 7[address] at the [facility].  Transport is coming between 1615 - 1700.  Pt is aware of her transfer and is ready to go.

## 2023-12-10 NOTE — PLAN OF CARE
Goal Outcome Evaluation:    Problem: Adult Inpatient Plan of Care  Goal: Optimal Comfort and Wellbeing  Outcome: Progressing     Problem: Electrolyte Imbalance  Goal: Electrolyte Balance  Outcome: Progressing     Problem: Fluid Volume Excess  Goal: Fluid Balance  Outcome: Progressing     Problem: Pain Acute  Goal: Optimal Pain Control and Function  Outcome: Progressing        Pt aox4. Abd pain controlled with prn oxycodone. Frequent cough, occasionally productive. VSS on RA. 240ml of input, no reported output this shift.

## 2023-12-10 NOTE — DISCHARGE SUMMARY
"[facility] MEDICINE TRANSFER SUMMARY     Primary Care Physician: Hannah Villalba  Admission Date: 12/4/2023   Discharge Provider: Mandy King MD Discharge Date: 12/10/2023   Diet:   Active Diet and Nourishment Order   Procedures    Room Service    Snacks/Supplements Adult: Ensure Max Protein (bariatric); With Meals    Combination Diet Regular Diet Adult; 2 gm NA Diet    Diet       Code Status: Full Code   Activity: DCACTIVITY: Activity as tolerated    Accepting Physician: Dr Andres        Condition at Discharge: Fair     REASON FOR PRESENTATION(See Admission Note for Details)   Abdominal distention    PRINCIPAL & ACTIVE DISCHARGE DIAGNOSES     Principal Problem:    SIRS (systemic inflammatory response syndrome) (H)  Active Problems:    Alcoholic cirrhosis of liver with ascites (H)    Anemia, unspecified type    Acute bronchitis due to respiratory syncytial virus (RSV)    FELIX (acute kidney injury) (H24)      PENDING LABS     Unresulted Labs Ordered in the Past 30 Days of this Admission       Date and Time Order Name Status Description    12/6/2023 12:06 PM Blood Culture Peripheral Blood Preliminary     12/6/2023 12:06 PM Blood Culture Peripheral Blood Preliminary             PROCEDURES ( this hospitalization only)      Paracentesis 12/5 3.5L  Paracentesis 12/8 5L    RECOMMENDATIONS TO OUTPATIENT PROVIDER FOR F/U VISIT     Follow-up Appointments     Follow Up and recommended labs and tests      Liver transplant evaluation; GI; nephrology consultations; financial SW            DISPOSITION     [facility]    SUMMARY OF HOSPITAL COURSE:      Priya (pronounced Danielle/\"like Cami with a K\") Eyad is a 28 year old female with known alcoholic cirrhosis, sober since 7/2023, presented with abdominal pain and distention, and is status post large volume paracentesis.  She was initially admitted for sepsis and RSV but then developed acute renal failure after the paracentesis.  IV albumin ordered on " 12/7.  Despite albumin creatinine continues to worsen. She has been accepted to [facility] and is transferring there today. Hospital Day: 7        #Sepsis secondary to RSV, SBP ruled out  -RSV positive on admission  -Ascites fluid cultures 12/5: NGTD  -Initially treated with Vanco and IV Rocephin, now off abx as no infectious source identified  -MSSA x2 strains from single bottle of blood culture 12/5- presume contaminants, subsequent cultures no growth and appears was drawn off a 7 hours old PIV (collection policy not followed)  -Supportive cares for RSV   -robitussin PRN  -feeling better     #Acute anemia on chronic disease requiring transfusion  -Has had severe anemia related to cirrhosis in the recent past, received a unit of PRBCs earlier this month through transplant physician on 11/7  -Hgb 5.5 on admit  -received 3 units pRBCs since admit  -monitor and replace for hemoglobin less than 7  -Fecal occult is positive  --needs outpatient EGD to rule out varices.     #Abdominal distention secondary to recurrent ascites in the setting of decompensated cirrhosis  #Hepatic encephalopathy  MELD-Na score of 31.  Transferring to the  for transfer to liver transplant team under Dr. Luci Davis as consulting hepatologist.  -ED removed 3.5 L and sent for testing  -Repeat paracentesis 12/8 for 5L (nephrologist recommended to take no more than 5L)  -still significant abdominal distention, but feels comfortable, not need repeat paracentesis today  - IV albumin given with nephrology guidance  -Supportive management for underlying pain  -Initially treated with spironolactone and IV Lasix but now held due to acute renal failure  -Asterixis improved with lactulose, stooling regularly  -strict intake and output     #Hyponatremia and acute renal failure likely due to dehydration from paracentesis and overdiuresis.  Hepatorenal syndrome suspected  -- Hold diuretics including Lasix, spironolactone. avoid nephrotoxic substances,  NSAIDs  -Received 50 g of IV albumin on 12/7 but creatinine continued to worsen and therefore nephrology consulted.  completed a 2 day course of 50 g of IV albumin twice daily.  --Midodrine started by nephrology.  -Cr worse daily, but rate of rise has slowed down     #Alcohol related cirrhosis  -Follows with Dr. Vanessa Peguero hepatology/liver transplant team outpatient  -now transferring to the  as mentioned above  -Has been sober since 7/27/2023     #Acquired hypothyroidism due to Hashimoto's thyroiditis  -Has been on thyroid replacement in the past  -thyroid function are within normal limits, not currently on levothyroxine.  Repeat TSH in 3 months.     #Hyperbilirubinemia in the setting of alcoholic cirrhosis  -Trend serum bilirubin     #Elevated INR the setting of liver disease  -Ordered course of vitamin K 5 mg daily due to spontaneous development of hematoma on the right forearm on 12/7     #Metabolic acidosis  -Likely due to acute renal failure.     #Hypokalemia resolved.     #Spontaneous hematoma of the right forearm on 12/7  -- Patient denies any trauma  -- X-ray ordered and negative for fracture or dislocation  -- INR is 2.42.  On course of vitamin K      Current Hospital Medications:     Current Facility-Administered Medications   Medication    acetaminophen (TYLENOL) tablet 650 mg    benzonatate (TESSALON) capsule 100 mg    calcium carbonate (TUMS) chewable tablet 1,000 mg    [Held by provider] furosemide (LASIX) tablet 40 mg    guaiFENesin (ROBITUSSIN) 20 mg/mL solution 15 mL    lactulose (CHRONULAC) solution 10 g    lidocaine (LMX4) cream    lidocaine 1 % 0.1-1 mL    LORazepam (ATIVAN) tablet 0.5 mg    melatonin tablet 5 mg    midodrine (PROAMATINE) tablet 2.5 mg    naloxone (NARCAN) injection 0.2 mg    Or    naloxone (NARCAN) injection 0.4 mg    Or    naloxone (NARCAN) injection 0.2 mg    Or    naloxone (NARCAN) injection 0.4 mg    ondansetron (ZOFRAN ODT) ODT tab 4 mg    Or    ondansetron (ZOFRAN)  injection 4 mg    oxyCODONE (ROXICODONE) tablet 5 mg    phytonadione (MEPHYTON/VITAMIN K) 1 MG/ML oral solution 5 mg    senna-docusate (SENOKOT-S/PERICOLACE) 8.6-50 MG per tablet 1 tablet    Or    senna-docusate (SENOKOT-S/PERICOLACE) 8.6-50 MG per tablet 2 tablet    sodium chloride (PF) 0.9% PF flush 3 mL    sodium chloride (PF) 0.9% PF flush 3 mL    [Held by provider] spironolactone (ALDACTONE) tablet 100 mg       Consults     PHARMACY TO DOSE VANCO  NEPHROLOGY IP CONSULT  CARE MANAGEMENT / SOCIAL WORK IP CONSULT      SIGNIFICANT IMAGING FINDINGS     Results for orders placed or performed during the hospital encounter of 12/04/23   XR Chest Port 1 View    Impression    IMPRESSION: Low lung volumes but the lungs appear clear. Normal heart size and pulmonary vascularity. No pleural fluid or pneumothorax.   US Paracentesis with Albumin    Impression    IMPRESSION:  1.  Ultrasound-guided therapeutic paracentesis performed without complication.    Reference CPT Code: 78776   XR Forearm Right 2 Views    Impression    IMPRESSION:     No evidence of acute fracture or dislocation. Joint spaces are preserved. Mild subcutaneous edema of the mid forearm.   US Paracentesis without Albumin    Impression    IMPRESSION:  1.  Status post ultrasound-guided paracentesis.    Reference CPT Code: 45582       SIGNIFICANT LABORATORY FINDINGS     See emr    Discharge Orders        Follow Up and recommended labs and tests    Liver transplant evaluation; GI; nephrology consultations; financial SW     Reason for your hospital stay    Abdominal distention, renal failure     Activity - Up ad nando     Full Code     Contact Isolation     Droplet Isolation     Diet    Follow this diet upon discharge:       Snacks/Supplements Adult: Ensure Max Protein (bariatric); With Meals      Combination Diet Regular Diet Adult; 2 gm NA Diet       Examination   Physical Exam   Temp:  [98.4  F (36.9  C)-100  F (37.8  C)] 98.4  F (36.9  C)  Pulse:  [104-111]  108  Resp:  [16-18] 18  BP: (108-122)/(55-65) 113/55  SpO2:  [95 %-99 %] 97 %  Wt Readings from Last 1 Encounters:   12/10/23 82.4 kg (181 lb 11.2 oz)       General: in no apparent distress, non-toxic, and alert female standing at bedside oriented x3  HEENT: Head normocephalic atraumatic, oral mucosa moist. Sclerae slightly icteric  GI: Belly distended with ascites  Skin: No rashes or lesions  Extremities: Trace ankle edema bilaterally  Psych: Normal affect, mildly anxious mood  Neuro: Grossly normal    Please see EMR for more detailed significant labs, imaging, consultant notes etc.    I, Mandy King MD, personally saw the patient today and spent greater than 30 minutes discharging this patient.    Mandy King MD  [facility]    CC:Annawan, Hannah M

## 2023-12-10 NOTE — PLAN OF CARE
Problem: Adult Inpatient Plan of Care  Goal: Absence of Hospital-Acquired Illness or Injury  Intervention: Prevent Infection  Recent Flowsheet Documentation  Taken 12/9/2023 2352 by Barry Vitale RN  Infection Prevention:   hand hygiene promoted   personal protective equipment utilized  Taken 12/9/2023 1615 by Barry Vitale RN  Infection Prevention:   hand hygiene promoted   personal protective equipment utilized     Problem: Infection  Goal: Absence of Infection Signs and Symptoms  Intervention: Prevent or Manage Infection  Recent Flowsheet Documentation  Taken 12/9/2023 2352 by Barry Vitale RN  Isolation Precautions: contact precautions maintained     Problem: Pain Acute  Goal: Optimal Pain Control and Function  Intervention: Prevent or Manage Pain  Recent Flowsheet Documentation  Taken 12/9/2023 2352 by Barry Vitale RN  Medication Review/Management: medications reviewed  Taken 12/9/2023 1615 by Barry Vitale RN  Sensory Stimulation Regulation: care clustered   Goal Outcome Evaluation:       Patient requested and received prn oxycodone 5 mg at 2140 for abdominal pain with positive result. Prn Ativan 0.5 mg was offered at 0140 per patient request for increased anxiety. Patient became clam and has been sleeping comfortably in her bed. Patient remained independent with ambulation in her room and with all activity of daily living.                    Addended Santos Pabon on: 5/9/2023 09:58 AM     Modules accepted: Orders
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
## 2023-12-10 NOTE — PROGRESS NOTES
RENAL PROGRESS NOTE      ASSESSMENT & PLAN:   This is 28-year-old female with past medical history significant for end-stage renal disease secondary to alcohol abuse complicated by recurrent large-volume ascites requiring paracenteses every 1 to 2 weeks who was admitted to [facility] on December 5, 2023 for evaluation of abdominal pain and distention.  Nephrology service is following for worsening of renal function.    Non oliguric Acute Kidney Insufficiency:  What appears to be modestly  progressive renal failure since 7/2023 with incremental rise in sCR from 0.4---->0.8 over the last 5 months, likely multifactorial--> but most c/w prerenal hemodynamics  with high volume/samira and ongoing diuretics and no Albumin at that time (Furosemide  and Aldactone last dosed 12/6--> para 12/4) and possibly unrecognized or relative HoTN; she also received Toradol 12/5 and oral NSAIDs post para --both have been DC'd), less likely Vanco tox (has been Dc'd regardless; in addition anemia with admit hgb 7s  HRS on differential as well.  FENA is 0.2% suggestive of prerenal etiology.  UA 12/5 neg for microscopic hematuria, concentrated, + proteinuria, + bili, hyaline casts--this after high volume para 12/4, suspect she was severely pre-renal, diuretic held as of  12/6.  Repeat UA 12/09 with 20 mg/dL of albumin and 18 hyaline casts.  Patient will proteinuria of 0.24 g/g..  This am completed albumin challenge 1 g/kgx48 hours  Today serum creatinine is trending up from 2.46 to 2.56 mg/dL.  Recs:  No indication for renal replacement therapy  Continue to hold diuretics today.  Monitor renal panel and urine output daily  Should RF decline to the point of needing dialysis, she would be a candidate to bridge her to transplant, assume ongoing candidacy per [facility] (sobriety since July '23).  Avoid nephrotoxins.  Renally dose medications.     Lytes/Acid-base:  Mild Hyponatremia:likely d/t + ADH from liver failure + dehydration.   Patient serum sodium has been fluctuating between 129-133 in the last 4 weeks.  Today serum sodium is 132.  Trend.  Acidosis: Again mild, likely compensatory for resp alk in the setting of liver failure.    HCO3 stalled at 22 this morning.    Hypokalemia:With poor intake, diuretics (Dc'd), s/p replacement, on protocol ok for now, but may need to manage off protocol if renal function continues to decline.     BP:  Mostly controlled, however, I  suspect that she has unrecognized HoTN ana after high volume paracentesis.  Started on scheduled Midodrine with holding parameters.   Monitor blood pressure closely, avoid hypo and hypertension.     Cirrhosis:  Decompensated liver dz, in the setting of ETOH  cirrhosis, with ascites, coagulopathy   Reportedly in transplant eval with the [facility], follows with DR Mendez, has appt for EGD onJan , and f/up in May, may need to consider expedited w/up if  ongoing more rapid decline   sober since 7/2023  Ascitic fluid cx from 12/5 negative   On scheduled lactulose  Patient is waiting transfer to [facility] when bed is available.     ? Sepsis:  Though only 1/2 cx pos for staph, ascitic c from 12/5 was negative, On admission UA not suggestive of UTI.  Received vancomycin and Rocephin.  Patient is currently off antibiotics.     Severe malnutrition;  With near ESLD, Alb 1.8 and BUN <6 on admission, has received some Alb  S/p 50g BID x 4 doses   Started on protein supplements   Repeat albumin today.    RSV-clinically improved.  Breathing comfortable on room air.  I will defer management to primary team.     H/o Hashimotos thyroiditis/Hypothyroidism:  Not on replacement with normal TSH, f/unit(s) per PCP outpt       We will follow.      SUBJECTIVE:    Was seen at the bedside and events were reviewed with nursing.  No acute issues overnight.  Patient accompanied by mother at the bedside.  Patient stated that she is feeling better, reports improved cough . Reports stable lower extremity edema and  abdominal distention.  Tolerating diet.  Tolerating lactulose.  Voiding normal amounts of urine without issues.  Urine output measurement has been not accurate.  Patient denies: fever, chills, dizziness, shortness of breath , chest pain, palpitations, orthopnea, nausea, vomiting, abdominal pain, dysuria, urinary frequency, urgency, hematuria, rash.  Updated on labs.  All questions answered.      OBJECTIVE:  Physical Exam   Temp: 98.7  F (37.1  C) Temp src: Oral BP: 122/65 Pulse: 109   Resp: 18 SpO2: 96 % O2 Device: None (Room air)    Vitals:    12/08/23 1055 12/08/23 1500 12/09/23 0748   Weight: 86.1 kg (189 lb 14.4 oz) 81.3 kg (179 lb 4.8 oz) 83.4 kg (183 lb 13.8 oz)     Vital Signs with Ranges  Temp:  [98.6  F (37  C)-100  F (37.8  C)] 98.7  F (37.1  C)  Pulse:  [104-113] 109  Resp:  [16-18] 18  BP: (108-122)/(59-65) 122/65  SpO2:  [95 %-99 %] 96 %  I/O last 3 completed shifts:  In: 480 [P.O.:480]  Out: 750 [Urine:750]    @TMAXR(24)@    Patient Vitals for the past 72 hrs:   Weight   12/09/23 0748 83.4 kg (183 lb 13.8 oz)   12/08/23 1500 81.3 kg (179 lb 4.8 oz)   12/08/23 1055 86.1 kg (189 lb 14.4 oz)   12/07/23 1112 82.1 kg (181 lb)   [unfilled]    PHYSICAL EXAM:  General - Alert and oriented x3, appears comfortable, NAD  Cardiovascular - Regular rate and rhythm, no rub  Respiratory - Clear to auscultation bilaterally, no crackles or wheezes  Abd: BS present, no guarding or pain with palpation, less distended, soft, + ascites  Extremities - stable 1+ b/l lower extremity edema bilaterally  Skin: dry, intact, no rash, good turgor  Neuro:  Grossly intact, no focal deficits  MSK:  Grossly intact  Psych:  Normal affect    LABORATORY STUDIES:     Recent Labs   Lab 12/10/23  0742 12/09/23  0719 12/07/23  0630 12/06/23  1424 12/05/23  1328 12/05/23  0517 12/04/23  1806   WBC 12.6* 13.0* 15.1*  --   --  16.8* 16.2*   RBC 2.56* 2.82* 2.67*  --   --  2.48* 2.01*   HGB 7.2* 7.8* 7.4* 7.5* 7.9* 7.0* 5.5*   HCT 22.8* 25.0*  23.2*  --   --  21.9* 18.0*   * 105* 111*  --   --  160 192       Basic Metabolic Panel:  Recent Labs   Lab 12/10/23  0742 12/09/23  0719 12/08/23  1354 12/08/23  0719 12/07/23  0630 12/06/23  2052 12/06/23  1424 12/06/23  0709 12/05/23  0517   * 131*  --  129* 130*  --   --  132* 131*   POTASSIUM 3.9 3.8 3.7 3.4 3.5 3.6   < > 3.0* 4.0   CHLORIDE 100 100  --  99 101  --   --  101 101   CO2 22 22  --  21* 21*  --   --  24 19*   BUN 8.6 8.3  --  8.3 7.4  --   --  5.4* 5.5*   CR 2.56* 2.46*  --  2.30* 1.87*  --   --  1.11* 0.80   * 87  --  95 97  --   --  80 107*   JULIAN 8.4* 8.1*  --  7.3* 6.9*  --   --  7.3* 7.1*    < > = values in this interval not displayed.       INR  Recent Labs   Lab 12/08/23  1354 12/07/23  0630 12/05/23  0517 12/05/23  0116   INR 2.42* 2.57* 2.38* 2.34*        Recent Labs   Lab Test 12/10/23  0742 12/09/23  0719 12/08/23  1354 12/07/23  0630   INR  --   --  2.42* 2.57*   WBC 12.6* 13.0*  --  15.1*   HGB 7.2* 7.8*  --  7.4*   * 105*  --  111*       Personally reviewed current labs     ~ 50 Minutes today spent in exam, POC, education regarding renal disease and management, counseling and/or discussion with patient's care team.      Melissa Caruso MD  [facility]  [address], [address]  [place] [address]  Phone# [phone]  Fax# [phone]

## 2023-12-10 NOTE — PROGRESS NOTES
Care Management Discharge Note    Discharge Date: 12/10/2023       Discharge Disposition: [facility] transfer    Discharge Services:      Discharge DME:      Discharge Transportation:  [facility] transportation    Private pay costs discussed: transportation costs    Does the patient's insurance plan have a 3 day qualifying hospital stay waiver?  No    PAS Confirmation Code:  NA  Patient/family educated on Medicare website which has current facility and service quality ratings:  Na    Education Provided on the Discharge Plan:  yes  Persons Notified of Discharge Plans: yes  Patient/Family in Agreement with the Plan:  yes    Handoff Referral Completed: Yes    Additional Information:  Pt transferring to [facility].   Transport already arranged.    CARLINE Leigh

## 2023-12-10 NOTE — PLAN OF CARE
Goal Outcome Evaluation:      Plan of Care Reviewed With: patient    Overall Patient Progress: no changeOverall Patient Progress: no change    Outcome Evaluation: Pt has had a good day.  She had some abdominal pain this morning, oxy helped significantly.  Pt is coughing on and off.  Cough medicine ordered, but pt hasn't wanted it yet. Her mom was here and they walked the halls then pt napped.  Pt needed some ativan this afternoon for some anxiety.  She has eaten well and is voiding well.  Pt is independent in the room.

## 2023-12-10 NOTE — PLAN OF CARE
Goal Outcome Evaluation:       Patient transported to [facility] via ems, has all belongings.

## 2023-12-11 ENCOUNTER — APPOINTMENT (OUTPATIENT)
Dept: CARDIOLOGY | Facility: CLINIC | Age: 28
DRG: 432 | End: 2023-12-11

## 2023-12-11 ENCOUNTER — APPOINTMENT (OUTPATIENT)
Dept: ULTRASOUND IMAGING | Facility: CLINIC | Age: 28
DRG: 432 | End: 2023-12-11

## 2023-12-11 LAB
ALBUMIN MFR UR ELPH: 36.3 MG/DL
ALBUMIN SERPL BCG-MCNC: 3.7 G/DL (ref 3.5–5.2)
ALBUMIN UR-MCNC: 20 MG/DL
ALP SERPL-CCNC: 101 U/L (ref 40–150)
ALT SERPL W P-5'-P-CCNC: 9 U/L (ref 0–50)
ANION GAP SERPL CALCULATED.3IONS-SCNC: 11 MMOL/L (ref 7–15)
APPEARANCE UR: ABNORMAL
AST SERPL W P-5'-P-CCNC: 63 U/L (ref 0–45)
BACTERIA BLD CULT: NO GROWTH
BACTERIA BLD CULT: NO GROWTH
BILIRUB SERPL-MCNC: 4.3 MG/DL
BILIRUB UR QL STRIP: NEGATIVE
BUN SERPL-MCNC: 9.1 MG/DL (ref 6–20)
CALCIUM SERPL-MCNC: 8.5 MG/DL (ref 8.6–10)
CERULOPLASMIN SERPL-MCNC: 4 MG/DL (ref 20–60)
CHLORIDE SERPL-SCNC: 100 MMOL/L (ref 98–107)
COLOR UR AUTO: YELLOW
CREAT SERPL-MCNC: 2.5 MG/DL (ref 0.51–0.95)
CREAT UR-MCNC: 219 MG/DL
DEPRECATED HCO3 PLAS-SCNC: 20 MMOL/L (ref 22–29)
EGFRCR SERPLBLD CKD-EPI 2021: 26 ML/MIN/1.73M2
ERYTHROCYTE [DISTWIDTH] IN BLOOD BY AUTOMATED COUNT: 23.6 % (ref 10–15)
GLUCOSE SERPL-MCNC: 92 MG/DL (ref 70–99)
GLUCOSE UR STRIP-MCNC: NEGATIVE MG/DL
HCT VFR BLD AUTO: 23.8 % (ref 35–47)
HGB BLD-MCNC: 7.5 G/DL (ref 11.7–15.7)
HGB UR QL STRIP: NEGATIVE
HYALINE CASTS: 12 /LPF
IRON BINDING CAPACITY (ROCHE): NORMAL
IRON SATN MFR SERPL: NORMAL %
IRON SERPL-MCNC: 61 UG/DL (ref 37–145)
KETONES UR STRIP-MCNC: NEGATIVE MG/DL
LEUKOCYTE ESTERASE UR QL STRIP: ABNORMAL
LVEF ECHO: NORMAL
MCH RBC QN AUTO: 29.1 PG (ref 26.5–33)
MCHC RBC AUTO-ENTMCNC: 31.5 G/DL (ref 31.5–36.5)
MCV RBC AUTO: 92 FL (ref 78–100)
MUCOUS THREADS #/AREA URNS LPF: PRESENT /LPF
NITRATE UR QL: NEGATIVE
OSMOLALITY UR: 211 MMOL/KG (ref 100–1200)
PH UR STRIP: 5.5 [PH] (ref 5–7)
PHOSPHATE SERPL-MCNC: 2.6 MG/DL (ref 2.5–4.5)
PLATELET # BLD AUTO: 129 10E3/UL (ref 150–450)
POTASSIUM SERPL-SCNC: 3.7 MMOL/L (ref 3.4–5.3)
PROT SERPL-MCNC: 6.6 G/DL (ref 6.4–8.3)
PROT/CREAT 24H UR: 0.17 MG/MG CR (ref 0–0.2)
RBC # BLD AUTO: 2.58 10E6/UL (ref 3.8–5.2)
RBC URINE: 1 /HPF
SODIUM SERPL-SCNC: 131 MMOL/L (ref 135–145)
SODIUM UR-SCNC: <20 MMOL/L
SP GR UR STRIP: 1.01 (ref 1–1.03)
SQUAMOUS EPITHELIAL: 11 /HPF
UROBILINOGEN UR STRIP-MCNC: NORMAL MG/DL
WBC # BLD AUTO: 12.9 10E3/UL (ref 4–11)
WBC URINE: 16 /HPF

## 2023-12-11 PROCEDURE — 250N000013 HC RX MED GY IP 250 OP 250 PS 637

## 2023-12-11 PROCEDURE — 84156 ASSAY OF PROTEIN URINE: CPT

## 2023-12-11 PROCEDURE — 80053 COMPREHEN METABOLIC PANEL: CPT

## 2023-12-11 PROCEDURE — 83550 IRON BINDING TEST: CPT | Performed by: CLINICAL NURSE SPECIALIST

## 2023-12-11 PROCEDURE — 99418 PROLNG IP/OBS E/M EA 15 MIN: CPT | Mod: FS | Performed by: INTERNAL MEDICINE

## 2023-12-11 PROCEDURE — 36415 COLL VENOUS BLD VENIPUNCTURE: CPT | Performed by: STUDENT IN AN ORGANIZED HEALTH CARE EDUCATION/TRAINING PROGRAM

## 2023-12-11 PROCEDURE — 255N000002 HC RX 255 OP 636: Performed by: INTERNAL MEDICINE

## 2023-12-11 PROCEDURE — 76700 US EXAM ABDOM COMPLETE: CPT | Mod: 26 | Performed by: RADIOLOGY

## 2023-12-11 PROCEDURE — 85027 COMPLETE CBC AUTOMATED: CPT

## 2023-12-11 PROCEDURE — 99255 IP/OBS CONSLTJ NEW/EST HI 80: CPT | Performed by: STUDENT IN AN ORGANIZED HEALTH CARE EDUCATION/TRAINING PROGRAM

## 2023-12-11 PROCEDURE — 250N000013 HC RX MED GY IP 250 OP 250 PS 637: Performed by: STUDENT IN AN ORGANIZED HEALTH CARE EDUCATION/TRAINING PROGRAM

## 2023-12-11 PROCEDURE — 120N000002 HC R&B MED SURG/OB UMMC

## 2023-12-11 PROCEDURE — 76700 US EXAM ABDOM COMPLETE: CPT

## 2023-12-11 PROCEDURE — 83615 LACTATE (LD) (LDH) ENZYME: CPT | Performed by: INTERNAL MEDICINE

## 2023-12-11 PROCEDURE — 36415 COLL VENOUS BLD VENIPUNCTURE: CPT

## 2023-12-11 PROCEDURE — 999N000208 ECHOCARDIOGRAM COMPLETE

## 2023-12-11 PROCEDURE — 93306 TTE W/DOPPLER COMPLETE: CPT | Mod: 26 | Performed by: INTERNAL MEDICINE

## 2023-12-11 PROCEDURE — 84300 ASSAY OF URINE SODIUM: CPT

## 2023-12-11 PROCEDURE — 99255 IP/OBS CONSLTJ NEW/EST HI 80: CPT | Mod: FS | Performed by: STUDENT IN AN ORGANIZED HEALTH CARE EDUCATION/TRAINING PROGRAM

## 2023-12-11 PROCEDURE — 250N000011 HC RX IP 250 OP 636: Mod: JZ | Performed by: STUDENT IN AN ORGANIZED HEALTH CARE EDUCATION/TRAINING PROGRAM

## 2023-12-11 PROCEDURE — 93975 VASCULAR STUDY: CPT | Mod: 26 | Performed by: RADIOLOGY

## 2023-12-11 PROCEDURE — 83935 ASSAY OF URINE OSMOLALITY: CPT

## 2023-12-11 PROCEDURE — 87040 BLOOD CULTURE FOR BACTERIA: CPT | Performed by: STUDENT IN AN ORGANIZED HEALTH CARE EDUCATION/TRAINING PROGRAM

## 2023-12-11 PROCEDURE — 81001 URINALYSIS AUTO W/SCOPE: CPT

## 2023-12-11 PROCEDURE — 99418 PROLNG IP/OBS E/M EA 15 MIN: CPT | Performed by: STUDENT IN AN ORGANIZED HEALTH CARE EDUCATION/TRAINING PROGRAM

## 2023-12-11 PROCEDURE — 99207 PR APP CREDIT; MD BILLING SHARED VISIT: CPT | Performed by: STUDENT IN AN ORGANIZED HEALTH CARE EDUCATION/TRAINING PROGRAM

## 2023-12-11 PROCEDURE — 99222 1ST HOSP IP/OBS MODERATE 55: CPT | Mod: GC | Performed by: STUDENT IN AN ORGANIZED HEALTH CARE EDUCATION/TRAINING PROGRAM

## 2023-12-11 PROCEDURE — 84100 ASSAY OF PHOSPHORUS: CPT | Performed by: CLINICAL NURSE SPECIALIST

## 2023-12-11 PROCEDURE — 99233 SBSQ HOSP IP/OBS HIGH 50: CPT | Mod: FS | Performed by: INTERNAL MEDICINE

## 2023-12-11 PROCEDURE — 80321 ALCOHOLS BIOMARKERS 1OR 2: CPT

## 2023-12-11 RX ORDER — LORAZEPAM 0.5 MG/1
0.5 TABLET ORAL EVERY 8 HOURS PRN
Status: DISCONTINUED | OUTPATIENT
Start: 2023-12-11 | End: 2023-12-11

## 2023-12-11 RX ORDER — OXYCODONE HYDROCHLORIDE 5 MG/1
5 TABLET ORAL EVERY 4 HOURS PRN
COMMUNITY
End: 2024-02-02

## 2023-12-11 RX ORDER — HYDROXYZINE HYDROCHLORIDE 25 MG/1
25 TABLET, FILM COATED ORAL EVERY 6 HOURS PRN
Status: DISCONTINUED | OUTPATIENT
Start: 2023-12-11 | End: 2023-12-14 | Stop reason: HOSPADM

## 2023-12-11 RX ORDER — CEFAZOLIN SODIUM 2 G/100ML
2 INJECTION, SOLUTION INTRAVENOUS EVERY 12 HOURS
Qty: 2800 ML | Refills: 0 | Status: DISCONTINUED | OUTPATIENT
Start: 2023-12-11 | End: 2023-12-12

## 2023-12-11 RX ORDER — MIDODRINE HYDROCHLORIDE 2.5 MG/1
2.5 TABLET ORAL 3 TIMES DAILY
COMMUNITY

## 2023-12-11 RX ORDER — ESCITALOPRAM OXALATE 5 MG/1
5 TABLET ORAL DAILY
Status: DISCONTINUED | OUTPATIENT
Start: 2023-12-11 | End: 2023-12-14 | Stop reason: HOSPADM

## 2023-12-11 RX ORDER — NALOXONE HYDROCHLORIDE 0.4 MG/ML
0.4 INJECTION, SOLUTION INTRAMUSCULAR; INTRAVENOUS; SUBCUTANEOUS
Status: DISCONTINUED | OUTPATIENT
Start: 2023-12-11 | End: 2023-12-14 | Stop reason: HOSPADM

## 2023-12-11 RX ORDER — ACETAMINOPHEN 325 MG/1
650 TABLET ORAL EVERY 8 HOURS PRN
Status: DISCONTINUED | OUTPATIENT
Start: 2023-12-11 | End: 2023-12-14 | Stop reason: HOSPADM

## 2023-12-11 RX ORDER — LACTULOSE 10 G/15ML
10 SOLUTION ORAL DAILY
COMMUNITY

## 2023-12-11 RX ORDER — NALOXONE HYDROCHLORIDE 0.4 MG/ML
0.2 INJECTION, SOLUTION INTRAMUSCULAR; INTRAVENOUS; SUBCUTANEOUS
Status: DISCONTINUED | OUTPATIENT
Start: 2023-12-11 | End: 2023-12-14 | Stop reason: HOSPADM

## 2023-12-11 RX ORDER — OXYCODONE HYDROCHLORIDE 5 MG/1
5 TABLET ORAL EVERY 8 HOURS PRN
Status: DISCONTINUED | OUTPATIENT
Start: 2023-12-11 | End: 2023-12-14 | Stop reason: HOSPADM

## 2023-12-11 RX ORDER — ESCITALOPRAM OXALATE 10 MG/1
10 TABLET ORAL DAILY
Status: DISCONTINUED | OUTPATIENT
Start: 2023-12-25 | End: 2023-12-14 | Stop reason: HOSPADM

## 2023-12-11 RX ORDER — HYDROXYZINE HYDROCHLORIDE 50 MG/1
50 TABLET, FILM COATED ORAL EVERY 6 HOURS PRN
Status: DISCONTINUED | OUTPATIENT
Start: 2023-12-11 | End: 2023-12-14 | Stop reason: HOSPADM

## 2023-12-11 RX ADMIN — HYDROXYZINE HYDROCHLORIDE 25 MG: 25 TABLET, FILM COATED ORAL at 11:01

## 2023-12-11 RX ADMIN — LORAZEPAM 0.5 MG: 0.5 TABLET ORAL at 01:54

## 2023-12-11 RX ADMIN — ESCITALOPRAM 5 MG: 5 TABLET, FILM COATED ORAL at 11:01

## 2023-12-11 RX ADMIN — OXYCODONE HYDROCHLORIDE 5 MG: 5 TABLET ORAL at 11:01

## 2023-12-11 RX ADMIN — GUAIFENESIN 200 MG: 200 SOLUTION ORAL at 20:46

## 2023-12-11 RX ADMIN — CEFAZOLIN SODIUM 2 G: 2 INJECTION, SOLUTION INTRAVENOUS at 18:26

## 2023-12-11 RX ADMIN — OXYCODONE HYDROCHLORIDE 5 MG: 5 TABLET ORAL at 20:46

## 2023-12-11 RX ADMIN — ACETAMINOPHEN 650 MG: 325 TABLET, FILM COATED ORAL at 11:01

## 2023-12-11 RX ADMIN — GUAIFENESIN 200 MG: 200 SOLUTION ORAL at 01:54

## 2023-12-11 RX ADMIN — HUMAN ALBUMIN MICROSPHERES AND PERFLUTREN 5 ML: 10; .22 INJECTION, SOLUTION INTRAVENOUS at 10:14

## 2023-12-11 ASSESSMENT — ACTIVITIES OF DAILY LIVING (ADL)
ADLS_ACUITY_SCORE: 20

## 2023-12-11 NOTE — PHARMACY-ADMISSION MEDICATION HISTORY
Pharmacist Admission Medication History    Admission medication history is complete. The information provided in this note is only as accurate as the sources available at the time of the update.    Information Source(s): Hospital records, Facility (U/NH/) medication list/MAR, and CareEverywhere/SureScripts via N/A    Pertinent Information: Reviewed [facility] MAR (transferred to [facility] 12/10/23).  Only was taking furosemide and spironolactone prior to [facility] admission. Added new medications based on what was still ordered by discharge from [facility], exceptions are lorazepam 0.5mg q4h prn anxiety andd vitamin k 5mg daily (completed 3 days, not sure what the plan was)    Changes made to PTA medication list:  Added: oxycodonee, lactulose, midodrine  Deleted: None  Changed: None    Medication Affordability:       Allergies reviewed with patient and updates made in EHR: no    Medication History Completed By: Jovany Chauhan PharmD, [facility]    [phone]  Pager [phone]      PTA Med List   Medication Sig Last Dose    lactulose (CHRONULAC) 10 GM/15ML solution Take 10 g by mouth daily 12/10/2023    midodrine (PROAMATINE) 2.5 MG tablet Take 2.5 mg by mouth 3 times daily Hold for BP > 115 12/8/2023    oxyCODONE (ROXICODONE) 5 MG tablet Take 5 mg by mouth every 4 hours as needed for severe pain 12/10/2023

## 2023-12-11 NOTE — PLAN OF CARE
Goal Outcome Evaluation:      Plan of Care Reviewed With: patient    Overall Patient Progress: no changeOverall Patient Progress: no change    Outcome Evaluation: Plan is for Echo, renal and abdominal Ultrasound today. Jaundiced and abdomen distended.    Pt is alert and oriented x4, HR is tachycardic with activity, otherwise, VSS on RA Left PIV, SL. PRN ativan given 0155 for anxiety. Up independently. Plan for nephrology and liver consult in AM, Echo, renal and abdominal Ultrasound today as part of work up for possible transplant in future. Continue to monitor and follow POC

## 2023-12-11 NOTE — PROGRESS NOTES
Nephrology Progress Note  12/11/2023       Mrs Castano is 28-year-old female with past medical history significant for end-stage renal disease secondary to alcohol abuse complicated by recurrent large-volume ascites requiring paracenteses every 1 to 2 weeks who was admitted to [facility] on December 5, 2023 for evaluation of abdominal pain and distention.  Nephrology service is following for worsening of renal function.     Interval History :   Mrs Castano was seen by Nephrology at [facility], continuing care here at [facility].  Has FELIX but Cr stable the past ~48h, etiology is hypoperfusion based on hyaline casts on UA and low Roula but it is difficult to tell if this is true hypovolemia or HRS physiology.  Waiting for some UOP to do microscopy which can help if we see gran casts (which would suggest against HRS).  Completed albumin challenge, has responded fairly well to midodrine.  Would consider terlipressin if we decide she likely has HRS but with stabilization of Cr the past ~48h we can hold for now.  BUN of 9 with Cr of 2.5 is sign of poor nutrition overall.      Assessment & Recommendations:   FELIX-Baseline Cr as low as 0.4 in 7/2023 but more recently has been ~0.8, had hyaline casts on UA consistent with prerenal or HRS.  Did receive toradol on 12/5 which has been stopped.  UPCR 0.2g/g, received 1g/g albumin x48h and Cr has plateaued at ~2.5.  Continuing supportive cares, there is some suspicion for infection with 1/2 cultures +.  Looking to do urine microscopy to see if there is evidence of ATN or if we should consider terlipressin for HRS.  BP's are higher than one would expect for HRS but did respond to midodrine relatively well.     -FELIX, hemodynamic with hyaline casts.  Hypovolemia vs HRS   -Pt and RN to collect urine for microscopy when able to help with DDx.     -Continuing midodrine, with Cr stable the past 48h we are continuing supportive cares   -Hepatology to see for workup for liver  tx.      Volume status-K 3.7, bicarb 20, Na 131.  No acute issues.     Electrolytes/pH-K 3.7, bicarb 20, Na 131    Hepatology-Has noticed ascites for the past ~2 months, Meld 3.0=35 today, getting workup for liver tx, sober since 7/2023 and some ? Of other etiology.      Ca/phos/pth-Ca 8.5, Mg 2.0, Phos not checked, added on.      Anemia-Hgb 7.5, will check iron stores.     Nutrition-Taking PO but supect poor intake with BUN of 9.      Time spent: 45 minutes on this date of encounter for chart review, physical exam, medical decision making and co-ordination of care.     Seen and discussed with Dr Downs    Recommendations were communicated to primary team via verbal communication.     LAZ Khanna CNS  Clinical Nurse Specialist  [phone]      Review of Systems:   I reviewed the following systems:  Gen: No fevers or chills  CV: No CP at rest  Resp: No SOB at rest  GI: No N/V    Physical Exam:   I/O last 3 completed shifts:  In: -   Out: 100 [Urine:100]   /64 (BP Location: Right arm)   Pulse 111   Temp 98.8  F (37.1  C) (Oral)   Resp 16   SpO2 99%      GENERAL APPEARANCE: + abd distension but in no distress  EYES: + scleral icterus, pupils equal  HENT: mouth without ulcers or lesions  PULM: lungs clear to auscultation, equal air movement, no cyanosis or clubbing  CV: regular rhythm, normal rate     -edema trace LE  GI: soft, non-tender, moderately distended  MS: no evidence of inflammation in joints, no muscle tenderness  NEURO: mentation intact and speech normal    Labs:   All labs reviewed by me  Electrolytes/Renal -   Recent Labs   Lab Test 12/11/23  0634 12/10/23  0742 12/09/23  0719 12/08/23  1354 12/08/23  0719 12/06/23  1424 12/06/23  0709 08/08/23  1140 07/30/23  0639 07/29/23  0625   * 132* 131*  --  129*   < > 132*   < > 139 139   POTASSIUM 3.7 3.9 3.8   < > 3.4   < > 3.0*   < > 4.1 3.9   CHLORIDE 100 100 100  --  99   < > 101   < > 109* 107   CO2 20* 22 22  --  21*   < > 24   < >  25 23   BUN 9.1 8.6 8.3  --  8.3   < > 5.4*   < > 5.6* 3.8*   CR 2.50* 2.56* 2.46*  --  2.30*   < > 1.11*   < > 0.58 0.55   GLC 92 100* 87  --  95   < > 80   < > 101* 96   JULIAN 8.5* 8.4* 8.1*  --  7.3*   < > 7.3*   < > 7.6* 7.6*   MAG  --  2.0 2.0  --  2.2   < > 1.1*  --  1.8 1.7   PHOS  --   --   --   --   --   --  3.1  --  3.9 3.6    < > = values in this interval not displayed.       CBC -   Recent Labs   Lab Test 12/11/23  0634 12/10/23  0742 12/09/23  0719   WBC 12.9* 12.6* 13.0*   HGB 7.5* 7.2* 7.8*   * 110* 105*       LFTs -   Recent Labs   Lab Test 12/11/23  0634 12/10/23  0742 12/07/23  0630 12/06/23  0709   ALKPHOS 101  --  192* 235*   BILITOTAL 4.3*  --  2.7* 4.4*   ALT 9  --  24 29   AST 63*  --  97* 131*   PROTTOTAL 6.6  --  5.8* 6.9   ALBUMIN 3.7 3.6 1.8* 2.3*       Iron Panel -   Recent Labs   Lab Test 12/10/23  2118   ADDY 70           Current Medications:   escitalopram  5 mg Oral Daily    Followed by    [START ON 12/25/2023] escitalopram  10 mg Oral Daily    sodium chloride (PF)  3 mL Intracatheter Q8H

## 2023-12-11 NOTE — CONSULTS
[facility] CONSULTATION     Patient:  Priya Castano   Date of birth 1995, Medical record number [id number]  Date of Visit:  12/11/2023  Date of Admission: 12/10/2023  Consult Requester:Leonid Elliott DO          Assessment and Recommendations:   ID Problem List  MSSA positive blood culture 12/5  Decompensated alcohol related cirrhosis with ascites, coagulopathy, anemia, thrombocytopenia, and encephalopathy    RECOMMENDATION:  Repeat blood cultures x2 today since she's been off antibiotics  Start IV cefazolin 2g q12 hrs (renally dose adjusted)  Duration of therapy = 14 days for uncomplicated Staph bacteremia (12/11 - 12/25), unless repeat positive cultures  Recommend updating vaccines prior to transplant (ongoing pre-transplant workup)    ASSESSMENT:  Priya Castano is a 28 year old female with PMHx significant for Hashimoto's thyroiditis, decompensated alcohol related cirrhosis c/b ascites and anemia, thrombocytopenia, coagulopathy who was transferred from [facility] to [facility] on 12/10 for worsening FELIX and pre-transplant workup. ID consulted for positive blood cultures.    Clinically presented with abdominal pain and distention, met sepsis criteria and had fever to Tmax 101.1F. She was found with +RSV (no O2 needs), +blood cultures 12/5 for two strains of MSSA with only one set collected from supposedly a 7 hr old peripheral IV (repeat 12/6 NGTD), and ascites with no evidence of SBP. Procalcitonin 0.15. Was treated with IV vancomycin for two days, ceftriaxone for 4 days before discontinued. Has remained afebrile, vitals stable, with downtrending leukocytosis since admission.     While her blood cultures may be a contaminant, Staph aureus is never treated as such. She meets criteria for uncomplicated Staph bacteremia. Would recommend repeat blood cultures x2 today as she's been off antibiotics to ensure no ongoing bacteremia, and then start IV cefazolin renally adjusted. Reassuring TTE  without evidence of endocarditis, do not feel she needs TIFFANIE. Duration of antibiotic therapy to be 14 days (12/11 - 12/25).     Thank you for this consult. ID will continue to follow blood culture results peripherally.     Patient was discussed with Dr. Hernandez. Recommendations discussed with primary team.    Pina Gomes PA-C  Infectious Diseases  Pager #[phone]     80 MINUTES SPENT BY ME on the date of service doing chart review, history, exam, documentation & further activities per the note.           History of Present Illness:     Priya Castano is a 28 year old female with past medical history significant for Hashimoto's thyroiditis, decompensated alcohol related cirrhosis c/b ascites and anemia, coagulopathy who was transferred from [facility] to [facility] on 12/10 for worsening FELIX and pre-liver transplant workup. ID consulted for positive blood cultures at OSH.     She initially presented to OSH with abdominal pain and distention, admitted with sepsis. Was treated initially for presumed SBP (but not felt to have SBP - TNC = 1000, 3% neutrophils, Cx no growth, total protein 1.4). Received IV vancomycin x2 days + ceftriaxone x4 days. Was also positive for RSV on 12/5 with no oxygen requirements, managed with supportive care. Cough improving. Hospital course complicated by acute renal failure following paracentesis with concern for hepatorenal vs ATN and acute on chronic anemia requiring multiple transfusions.     Blood cultures 12/5 with MSSA x2 strains in single set, 1/2 bottles. Reportedly this was drawn from a PIV that was 7 hrs old. Repeat blood cultures 12/6 x2 with no growth at 4 days. TTE without valvular abnormalities. She denies any previous history of bacteremia. No IVDU. Denies fever, chills, night sweats, skin rash, joint pain/swelling. No hardware or devices. Has no history of SBP.    Was told about liver disease in 2022, completed rehab and no further alcohol use since 7/2023. Was drinking 1 bottle of  wine daily x4-5 years. Has family history of alcohol use disorder. Extended family lives in [place]. She lives in [place] with her parents. No pets, livestock. No recent travel. Has been to [place], [place] and [place]. No sick contacts. She works as an     MELD 3.0: 35 at 12/11/2023  6:34 AM  MELD-Na: 33 at 12/11/2023  6:34 AM  Calculated from:  Serum Creatinine: 2.50 mg/dL at 12/11/2023  6:34 AM  Serum Sodium: 131 mmol/L at 12/11/2023  6:34 AM  Total Bilirubin: 4.3 mg/dL at 12/11/2023  6:34 AM  Serum Albumin: 3.7 g/dL (Using max of 3.5 g/dL) at 12/11/2023  6:34 AM  INR(ratio): 2.42 at 12/10/2023  9:18 PM  Age at listing (hypothetical): 28 years  Sex: Female at 12/11/2023  6:34 AM         Review of Systems:   CONSTITUTIONAL:  No fevers, chills or night sweats.   EYES: negative for icterus, redness, or purulent drainage.   ENT:  negative for nasal congestion, rhinorrhea, or sore throat.  RESPIRATORY:  +cough, dyspnea.  CARDIOVASCULAR:  negative for chest pain or palpitations.  GASTROINTESTINAL:  negative for nausea, vomiting, diarrhea and constipation.  GENITOURINARY:  negative for dysuria, frequency, or urgency.   HEME:  No easy bruising or bleeding.  INTEGUMENT:  negative for rash and pruritus.  NEURO:  Negative for headache, vision changes, or numbness/tingling in extremities.         Past Medical History:     Past Medical History:   Diagnosis Date    Alcoholic cirrhosis of liver with ascites (H)     Hypothyroidism     SBP (spontaneous bacterial peritonitis) (H)             Past Surgical History:     Past Surgical History:   Procedure Laterality Date    IR PARACENTESIS  7/28/2023            Family History:     Family History   Problem Relation Age of Onset    Thyroid Disease Mother             Social History:     Social History     Tobacco Use    Smoking status: Never    Smokeless tobacco: Never   Substance Use Topics    Alcohol use: Yes     Comment: 1 bottle wine daily     History    Sexual Activity    Sexual activity: Never            Current Medications:      escitalopram  5 mg Oral Daily    Followed by    [START ON 12/25/2023] escitalopram  10 mg Oral Daily    sodium chloride (PF)  3 mL Intracatheter Q8H            Allergies:     Allergies   Allergen Reactions    Bee Venom Hives and Shortness Of Breath            Physical Exam:   Vitals were reviewed  Patient Vitals for the past 24 hrs:   BP Temp Temp src Pulse Resp SpO2   12/11/23 0647 115/64 98.8  F (37.1  C) Oral 111 16 99 %   12/10/23 2133 106/52 97.7  F (36.5  C) Oral 101 16 96 %   12/10/23 1712 131/74 98.7  F (37.1  C) Axillary 112 20 100 %       Physical Examination:  Constitutional: Pleasant adult female seen laying in bed, in NAD. Alert and interactive.   HEENT: NCAT, +icteric sclerae, conjunctiva clear. Moist mucous membranes without lesions or thrush. Dentition intact/well cared for.  Respiratory: Non-labored breathing, good air exchange. Lungs are clear to auscultation bilaterally, without wheezing, crackles or rhonchi.    Cardiovascular: Regular rate and rhythm, +systolic murmur  GI: Normoactive BS. Abdomen is soft, nontender to palpation, +distended. No rigidity or guarding. No rebound tenderness.  Skin: Warm and dry. No rashes or lesions on exposed surfaces.  Musculoskeletal: Extremities grossly normal. No tenderness or edema present.   Neurologic: A &O x3, speech normal, answering questions appropriately. Moves all extremities spontaneously. Grossly non-focal.  Neuropsychiatric: Mentation and affect normal/appropriate.  VAD: PIV is c/d/i with no erythema, drainage, or tenderness.         Laboratory Data:     Inflammatory Markers  No lab results found.    Hematology Studies    Recent Labs   Lab Test 12/11/23  0634 12/10/23  0742 12/09/23  0719 12/07/23  0630 12/06/23  1424 12/05/23  1328 12/05/23  0517 12/04/23  1806   WBC 12.9* 12.6* 13.0* 15.1*  --   --  16.8* 16.2*   HGB 7.5* 7.2* 7.8* 7.4* 7.5* 7.9* 7.0* 5.5*   MCV 92 89  "89 87  --   --  88 90   * 110* 105* 111*  --   --  160 192       Metabolic Studies     Recent Labs   Lab Test 12/11/23  0634 12/10/23  0742 12/09/23  0719 12/08/23  1354 12/08/23  0719 12/07/23  0630   * 132* 131*  --  129* 130*   POTASSIUM 3.7 3.9 3.8 3.7 3.4 3.5   CHLORIDE 100 100 100  --  99 101   CO2 20* 22 22  --  21* 21*   BUN 9.1 8.6 8.3  --  8.3 7.4   CR 2.50* 2.56* 2.46*  --  2.30* 1.87*   GFRESTIMATED 26* 25* 27*  --  29* 37*       Hepatic Studies    Recent Labs   Lab Test 12/11/23  0634 12/10/23  0742 12/07/23  0630 12/06/23  0709 12/05/23  0517 12/04/23  1806 11/07/23  0953   BILITOTAL 4.3*  --  2.7* 4.4* 3.7* 3.6* 3.7*   ALKPHOS 101  --  192* 235* 238* 244* 177*   ALBUMIN 3.7 3.6 1.8* 2.3* 2.3* 2.4* 2.4*   AST 63*  --  97* 131* 153* 155* 106*   ALT 9  --  24 29 34 34 23       Microbiology:  Culture   Date Value Ref Range Status   12/06/2023 No growth after 4 days  Preliminary   12/06/2023 No growth after 4 days  Preliminary   12/05/2023 No Growth  Final   12/05/2023 No Growth  Final   12/05/2023 Positive on the 1st day of incubation (A)  Final   12/05/2023 Staphylococcus aureus (AA)  Final     Comment:     1 of 2 bottles   12/05/2023 Staphylococcus aureus (AA)  Final     Comment:     1 of 2 bottles   07/28/2023 No Growth  Final   07/28/2023 No Growth  Final   07/28/2023 No Growth  Final       Urine Studies    Recent Labs   Lab Test 12/09/23  1230 12/05/23  0518   LEUKEST Negative Negative   WBCU 5 9*       Vancomycin Levels  No lab results found.    Invalid input(s): \"VANCO\"    Hepatitis B Testing   Recent Labs   Lab Test 07/27/23  1556   HEPBANG Nonreactive   HBCM Nonreactive     Hepatitis C Testing     Hepatitis C Antibody   Date Value Ref Range Status   07/27/2023 Nonreactive Nonreactive Final     Respiratory Virus Testing    No results found for: \"RS\", \"FLUAG\"    IMAGING  US Abdomen Complete w Doppler Complete  Result Date: 12/11/2023  Impression: 1.  Liver cirrhosis with findings of " portal hypertension including recanalized paraumbilical vein and retrograde flow within the right portal vein, moderate volume ascites and mild splenomegaly. 2.  Cholelithiasis and biliary sludge without evidence of acute cholecystitis.     US Paracentesis without Albumin  Result Date: 12/8/2023  A 5 Finnish catheter system was introduced into the abdominal ascites under ultrasound guidance. 5.0 liters of clear fluid were removed and sent to lab if requested.     XR Forearm Right 2 Views  Result Date: 12/7/2023  IMPRESSION: No evidence of acute fracture or dislocation. Joint spaces are preserved. Mild subcutaneous edema of the mid forearm.    US Paracentesis with Albumin  Result Date: 12/5/2023  10 liters of clear fluid were removed.     IMPRESSION: 1.  Ultrasound-guided therapeutic paracentesis performed without complication. Reference CPT Code: 72565    XR Chest Port 1 View  Result Date: 12/5/2023  IMPRESSION: Low lung volumes but the lungs appear clear. Normal heart size and pulmonary vascularity. No pleural fluid or pneumothorax.    ECHO  Echo Complete  Result Date: 12/11/2023  Interpretation Summary No significant valvular abnormalities present. Likely flow murmur related to high cardiac output (estimated 10 L/min). Global and regional left ventricular function is normal with an EF of 60-65%. Global right ventricular function is normal. No pericardial effusion is present. There is no prior study for direct comparison.

## 2023-12-11 NOTE — PLAN OF CARE
Goal Outcome Evaluation:      Plan of Care Reviewed With: patient    Overall Patient Progress: no changeOverall Patient Progress: no change    Outcome Evaluation: [id number]: AOx4, VSS on RA, intermittent tachycardia. Left PIV, SL. PRN ativan given 2115 for anxiety. Up independently. Plan for nephrology and liver consult in AM, in addition plan for echo, abd u/s and renal u/s while inpatient to work up patient for possible transplant in future. Continue with POC

## 2023-12-11 NOTE — PLAN OF CARE
Goal Outcome Evaluation:      Plan of Care Reviewed With: patient    Overall Patient Progress: no changeOverall Patient Progress: no change    Outcome Evaluation: patient having tests that she needs to be ok for liver transplant. urine sent to lab. patient took shower. met with lots of different departments. had oxy for abd pain. stomach is distended.

## 2023-12-11 NOTE — PLAN OF CARE
Goal Outcome Evaluation:      Plan of Care Reviewed With: patient    Overall Patient Progress: no changeOverall Patient Progress: no change    Outcome Evaluation: Admitted to [facility] for liver eval. Transferred from [place] at 5pm. A+Ox4. Up independently. Denies pain. Voiding adequately. Low sodium diet. Denies nausea. PIV saline locked.

## 2023-12-11 NOTE — CONSULTS
"[facility]    Hepatology Consult    Requesting provider: Julius Pickard    Consult requested for possible need for expedited liver transplant evaluation w/ concern for HRS versus possible ATN.    HPI:  Priya Castano is a 28 year old female w/ PMHx of AUD and ARLD admitted on  to [facility] after presenting with abdominal pain, distention. Admitted for sepsis suspected secondary to RSV, decompensated cirrhosis in the setting of alcoholic cirrhosis s/p multiple large volume paracentesis, and acute on chronic anemic requiring multiple pRBC transfusions. Hospital course was complicated by FELIX treated with IV albumin x 3 days and blood cx positive for staphylococcus aureus (1/2 bottles on ), all repeat blood cx NGTD.    Priya follows with outpatient hepatology [facility] and seen on 2023 to establish care, with initial MELD Na 24. After establishing care she required multiple paracentesis (11/10, ,  (10L),  (5L)). Previously on spironolactone and furosemide outpatient and continued during hospitalization. These medications discontinued on  due to concern for prerenal FELIX. Paracentesis this hospitalzation on  w/o SBP and cx NGTD. Treated w/ IV ceftriaxone (- ) and IV vancomycin (- ).     In regards to liver disease, she was initially notified regarding in 2023 after imaging that showed hepatomegaly with steatosis and nodular liver w/ascities. At that time she completed detox and has been sober since 2023. Prior to this she was drinking up to 1 bottle of wine daily over the last 4-5 years. Notes family hx of alcoholism w/uncle who  from \"kidney failure\", but no known hepatic disease in the family. Currently lives with her family (Aunt and Uncle) who she is very close to and is single at this time.    Medical hx Surgical hx   Past Medical History:   Diagnosis Date    Alcoholic cirrhosis of liver with ascites (H)     Hypothyroidism  "    SBP (spontaneous bacterial peritonitis) (H)       Past Surgical History:   Procedure Laterality Date    IR PARACENTESIS  7/28/2023          Medications  Current Facility-Administered Medications   Medication Dose Route Frequency    sodium chloride (PF)  3 mL Intracatheter Q8H       Allergies  Allergies   Allergen Reactions    Bee Venom Hives and Shortness Of Breath       Family hx Social hx   Family History   Problem Relation Age of Onset    Thyroid Disease Mother       Social History     Tobacco Use    Smoking status: Never    Smokeless tobacco: Never   Substance Use Topics    Alcohol use: Yes     Comment: 1 bottle wine daily    Drug use: No          Review of systems  A 10-point review of systems was negative.    Examination  /64 (BP Location: Right arm)   Pulse 111   Temp 98.8  F (37.1  C) (Oral)   Resp 16   SpO2 99%     Intake/Output Summary (Last 24 hours) at 12/11/2023 0757  Last data filed at 12/10/2023 2100  Gross per 24 hour   Intake --   Output 100 ml   Net -100 ml       Gen- well, NAD, A+Ox3, no juandice  Eye- EOMI, no scleral icterus  ENT- MMM  CVS- RRR  RS- CTA bilaterally  Abd-positive bowel sounds, distended abdomen, non tender to palpation, no rebound or guarding  Extr- trace YOON  Neuro- no asterixis  Skin- no rash on exposed skin  Psych- normal mood    Laboratory  Lab Results   Component Value Date     12/11/2023    POTASSIUM 3.7 12/11/2023    CHLORIDE 100 12/11/2023    CO2 20 12/11/2023    BUN 9.1 12/11/2023    CR 2.50 12/11/2023       Lab Results   Component Value Date    BILITOTAL 4.3 12/11/2023    ALT 9 12/11/2023    AST 63 12/11/2023    ALKPHOS 101 12/11/2023       Lab Results   Component Value Date    ALBUMIN 3.7 12/11/2023    PROTTOTAL 6.6 12/11/2023        Lab Results   Component Value Date    WBC 12.9 12/11/2023    HGB 7.5 12/11/2023    HGB 13.9 12/09/2013    MCV 92 12/11/2023     12/11/2023       Lab Results   Component Value Date    INR 2.42 12/10/2023     MELD  3.0: 35 at 12/11/2023  6:34 AM  MELD-Na: 33 at 12/11/2023  6:34 AM  Calculated from:  Serum Creatinine: 2.50 mg/dL at 12/11/2023  6:34 AM  Serum Sodium: 131 mmol/L at 12/11/2023  6:34 AM  Total Bilirubin: 4.3 mg/dL at 12/11/2023  6:34 AM  Serum Albumin: 3.7 g/dL (Using max of 3.5 g/dL) at 12/11/2023  6:34 AM  INR(ratio): 2.42 at 12/10/2023  9:18 PM  Age at listing (hypothetical): 28 years  Sex: Female at 12/11/2023  6:34 AM    Radiology  US Abdomen limited (12/11/2023)  Impression:   1.  Liver cirrhosis with findings of portal hypertension including  recanalized paraumbilical vein and retrograde flow within the right  portal vein, moderate volume ascites and mild splenomegaly.  2.  Cholelithiasis and biliary sludge without evidence of acute  cholecystitis.     XR Chest (12/05/2023)  IMPRESSION: Low lung volumes but the lungs appear clear. Normal heart size and pulmonary vascularity. No pleural fluid or pneumothorax.     Assessment    Ms. Castano is a 28 y.o. woman with PMHx of AUD, ARLD who was admitted for decompensated cirrhosis with evidence of recurrent ascites requiring frequent paracentesis (two during this hospital admission as well as scheduled outpatient paracentesis prior to admission). Course has been further complicated by acute on chronic anemia requiring frequent blood transfusions, x 3, positive blood cultures for staphylococcus aureus (1/2 bottles on 12/5), and FELIX s/p IV albumin x 3. With current status, she was transferred to the [facility] from [facility] on 12/10 for possible expedited liver transplant evaluation and FELIX.    # Decompensated cirrhosis w/ refractory ascities due to alcohol use  # Hyperbilirubinemia  # Elevated INR  # Mild hyponatremia  Etiology: ARLD  MELD-Na of  33  Hepatic encephalopathy: Minimal HE  Ascites: Noted on examination/ultrasound   TIPS: No  Esophageal/Gastric varices: No prior EGD  Hepatocellular carcinoma: Last USS was 7/27/2023 w/ AFP 2.3 (11/2023)  Transplant:  Not listed, evaluation pending   Nutrition: Weight is 181 lbs (12/2023); Gain/Loss,  Coagulopathy: INR 2.42  Thrombocytopenia: Related to cirrhosis  Paracentesis: 12/5, negative for SBP, Fluid Cx: NGTD    #FELIX   On admission Cr 0.67 which is within baseline. Over the course of hospitalization she required multiple large volume paracentesis and continued on diuretics. Cr elevated to 2.56. Treated with IV albumin x 3 days (completed 12/10) and spironolactone and lasix discontinued. Trending down on 12/11, making current HRS less likely, but will continue assess daily.     # Acute on chronic normocytic anemia requiring transfusions  Hemoglobin 5.5 on admission. Received a total of 3 units of pRBC since admission with improvement.    RECOMMENDATIONS:  -- Monitor transaminases, bilirubin, INR, and Cr  -- Hold on further fluids/albumin at this time (received albumin x 3 days)  -- Hold further furosemide and spironolactone   -- Avoid nephrotoxic medications  -- Monitor neurologic status  -- Hold lactulose PO (if becoming more confused can restart lactulose)  -- Ensure sodium restriction to 2000 mg per day  -- Adequate protein diet (1.2-1.5g/kg per day) w/ supplements in between meals  -- Follow up alpha 1 antitrypsin, ceruloplasmin, hemachromatosis, and peth previous acute hepatitis panel negative (7/2023)  -- Consult ID (blood cultures for staphylococcus aureus, positive on first day)  -- Social work consult for insurance application  -- Further liver evaluation outpatient (i.e. cervical cancer screening)    Care plan formally discussed and patient seen with Dr. Mendez.    Vanessa Webster MD  Internal Medicine PGY 2

## 2023-12-11 NOTE — PROGRESS NOTES
[facility]    Medicine Progress Note - Hospitalist Service, GOLD TEAM 8    Date of Admission:  12/10/2023    Assessment & Plan   Priya Castano is a 28 year old female w/ cirrhosis (currently attributed to alcohol use) admitted on 12/10/2023. She is a transfer from [facility] for further workup for worsening kidney function ISO liver cirrhosis and planning for transplant workup. GI and nephrology consulted on admission, appreciate assistance.     Decompensated Cirrhosis w/ ascites   Follows w[facility], Dr. Mendez. Currently undergoing liver transplant workup. Received paracentesis on 12/5, 11/27, 11/10. Outpatient, removing 5-6L,  followed by albumin and previously on spironolactone and furosemide PTA (stopped 12/6 2/2 concern for prerenal FELIX as below). No evidence of SBP on ascites fluid analysis, fluid cx NGTD (para 12/5). Treated w/ IV ceftriaxone (12/5- 12/8) and IV vancomycin (12/5- 12/6). Etiology of cirrhosis felt likely alcohol at this point. Has not had alcohol since 7/2723.   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  - Continue midodrine 2.5mg TID for now, consider discontinuation if HRS less likely    FELIX, HRS vs ATN  Seen by nephrology at [facility]-- suspected FELIX likely 2/2 high volume paracenteses and over-diuresis. However pt now s/p albumin challenge (6 50g 25% albumin doses since 12/7 without improvement), PTA diuretics held, yet Cr continues to worsen. Urine sodium 20, not consistent w/ HRS, ATN also possible dx.  - Nephrology consult placed, appreciate recs  - HOLD all nephrotoxic medications  - Daily CMP  to trend renal function and electrolytes  - Strict I&Os to monitor urine output  - Renal US  - Repeat UA w/ microscopic  - Urine studies: protein, sodium     Hyponatremia  Low Na in setting of liver disease possibly made worse by hypovolemia  - CMP daily     Anemia, chronic w/ acute drop, improved  Thrombocytopenia  On admission to [facility] 12/4, hgb 5.5, received 3U RBCs, hgb up to nearly 8, now hovering just above 7. Normocytic. Plt 105 on admission.   - Daily CBC to monitor hgb for now  - Transfuse < 7     RSV positive 12/5, c/f associated sepsis, improved  Admitted to [facility] 12/4/23 w/ c/f sepsis, RSV positive, also tx for presumed SBP (now ruled out). Presented w/ tachycardia (123), leukocytosis (16.2). Treated w/ IV ceftriaxone (12/5- 12/8) and IV vancomycin (12/5- 12/6).  Supportive cares for RSV. No documentation of need for supplemental O2.    - Supportive cares for RSV-- IS, guaifenesin prn  - Continue to monitor WBC     Systolic murmur  Appreciated on exam, Grade III.   - Echocardiogram     Acquired hypothyroidism due to Hashimoto's thyroiditis  Elevated TSH, normal free T4 12.5.  - Noted, no PTA meds at this time    PTSD  Anxiety  Has anxiety related to her liver disease. Was given ativan at [place]. Discussed anxiety with patient who is agreeable to starting selective serotonin reuptake inhibitor and hydroxyzine while stopping ativan.   - start lexapro 5 mg x 2 weeks then 10 mg daily  - Hydroxyzine 25-50 mg Q6H PRN  - Health psych consult           Diet: Regular Diet Adult    DVT Prophylaxis: Pneumatic Compression Devices  Short Catheter: Not present  Lines: None     Cardiac Monitoring: None  Code Status: Full Code      Clinically Significant Risk Factors Present on Admission          # Hypocalcemia: Lowest Ca = 8.4 mg/dL in last 2 days, will monitor and replace as appropriate      # Coagulation Defect: INR = 2.42 (Ref range: 0.85 - 1.15) and/or PTT = 50 Seconds (Ref range: 22 - 38 Seconds),  "will monitor for bleeding  # Thrombocytopenia: Lowest platelets = 110 in last 2 days, will monitor for bleeding  # Acute Kidney Injury, unspecified: based on a >150% or 0.3 mg/dL increase in last creatinine compared to past 90 day average, will monitor renal function       # Obesity: Estimated body mass index is 32.7 kg/m  as calculated from the following:    Height as of 12/4/23: 1.588 m (5' 2.5\").    Weight as of an earlier encounter on 12/10/23: 82.4 kg (181 lb 11.2 oz).              Disposition Plan      Expected Discharge Date: 12/15/2023                    Leonid Elliott, DO  Hospitalist Service, GOLD TEAM 8  [facility]  Securely message with [employer] (more info)  Text page via [employer] Paging/Directory   See signed in provider for up to date coverage information  ______________________________________________________________________    Interval History     Admitted overnight for liver transplant workup and FELIX from [facility]. Overall doing pretty well. She is feeling fine, she does have some anxiety but is ok stopping ativan and starting selective serotonin reuptake inhibitor as well as health psych consult. Plan to monitor kidney function, also needs insurance prior to starting liver transplant workup.     Physical Exam   Vital Signs: Temp: 98.8  F (37.1  C) Temp src: Oral BP: 115/64 Pulse: 111   Resp: 16 SpO2: 99 % O2 Device: None (Room air)    Weight: 0 lbs 0 oz    General Appearance: In bed, no distress   Respiratory: breathing comfortably on room air   Cardiovascular: RRR  GI: distended non tender   Skin: no rashes or lesions   Other: Non focal neuro exam      Medical Decision Making       55 MINUTES SPENT BY ME on the date of service doing chart review, history, exam, documentation & further activities per the note.      Data     I have personally reviewed the following data over the past 24 hrs:    12.9 (H)  \   7.5 (L)   / 129 (L)     131 (L) 100 9.1 /  92   3.7 20 " (L) 2.50 (H) \     ALT: 9 AST: 63 (H) AP: 101 TBILI: 4.3 (H)   ALB: 3.7 TOT PROTEIN: 6.6 LIPASE: N/A     INR:  2.42 (H) PTT:  50 (H)   D-dimer:  N/A Fibrinogen:  80 (LL)     Ferritin:  70 % Retic:  N/A LDH:  N/A       Imaging results reviewed over the past 24 hrs:   Recent Results (from the past 24 hour(s))   US Abdomen Complete w Doppler Complete    Narrative    EXAMINATION: US ABDOMEN COMPLETE WITH DOPPLER COMPLETE 12/11/2023 9:23  AM     COMPARISON: Abdominal ultrasound 7/27/2023    HISTORY: Known cirrhosis, eval for portal hypertension    TECHNIQUE: The abdomen was scanned in standard fashion with  specialized ultrasound transducer(s) using both gray-scale, color  Doppler, and spectral flow techniques.    Findings:  Liver: The liver demonstrates diffusely increased echogenicity,  heterogenous echotexture with a nodular contour. The liver measures  20.8 cm. No evidence of a focal hepatic mass.     Recannulized periumbilical vein is visualized with a flow of 71 cm/s.    Extrahepatic portal vein flow is antegrade at 21 cm/s.  Right portal vein flow is retrograde, measuring 27 cm/s.  Left portal vein flow is antegrade, measuring 26 cm/s.    Flow in the hepatic artery is towards the liver and:  214 cm/s peak systolic  0.56 resistive index.     The splenic vein was not visualized on this exam.  The left, middle,  and right hepatic veins are patent with flow towards the IVC. The IVC  is patent with flow towards the heart.   The visualized aorta is not  dilated.    Gallbladder: Within the gallbladder lumen, there is echogenic  shadowing foci and echogenic sludge. There is no wall thickening or  positive sonographic Morrison's sign.    Bile Ducts: The intrahepatic biliary system is of normal caliber. The  common bile duct was not visualized on this exam.    Pancreas: The pancreas was not visualized in this exam.    Kidneys: Both kidneys are of normal echotexture, without mass or  hydronephrosis.   Renal lengths: right- 14  cm, left- 12.2 cm.    Spleen: The spleen measures 13.9 cm in length.    Fluid: Moderate volume ascites within the right upper quadrant.      Impression    Impression:   1.  Liver cirrhosis with findings of portal hypertension including  recanalized paraumbilical vein and retrograde flow within the right  portal vein, moderate volume ascites and mild splenomegaly.  2.  Cholelithiasis and biliary sludge without evidence of acute  cholecystitis.    I have personally reviewed the examination and initial interpretation  and I agree with the findings.    MANUEL BARR MD         SYSTEM ID:  [id number]   Echo Complete   Result Value    LVEF  60-65%    Narrative    [id number]  [facility]  [id number]  [id number]^JAZZORI^RENE     [facility]  Echocardiography Laboratory  [address] [place] [address]     Name: YOEL CAMPOS  MRN: [id number]  : 1995  Study Date: 2023 09:40 AM  Age: 28 yrs  Gender: Female  [facility] [place] [facility]  Reason For Study: Murmur  Ordering Physician: RENE GARCIA  Referring Physician: LIBBY MARTINES  Performed By: Maria Luz Lopez     BSA: 1.8 m2  Height: 62 in  Weight: 181 lb  BP: 106/52 mmHg  ______________________________________________________________________________  Procedure  Complete Portable Echo Adult. Contrast Optison. Patient was given 5 ml mixture  of 3 ml Optison and 6 ml saline. 4 ml wasted.  ______________________________________________________________________________  Interpretation Summary  No significant valvular abnormalities present. Likely flow murmur related to  high cardiac output (estimated 10 L/min).  Global and regional left ventricular function is normal with an EF of 60-65%.  Global right ventricular function is normal.  No pericardial effusion is present.  There is no prior study for direct comparison.  ______________________________________________________________________________  Left Ventricle  Global and  regional left ventricular function is normal with an EF of 60-65%.  Left ventricular wall thickness is normal. Left ventricular size is normal.  Left ventricular diastolic function is normal. There is no thrombus seen in  the left ventricle.     Right Ventricle  The right ventricle is normal size. Global right ventricular function is  normal.     Atria  The right atria appears normal. Mild left atrial enlargement is present.     Mitral Valve  The mitral valve is normal.     Aortic Valve  Aortic valve is normal in structure and function.     Tricuspid Valve  The tricuspid valve is normal. The peak velocity of the tricuspid regurgitant  jet is not obtainable.     Pulmonic Valve  The pulmonic valve is normal.     Vessels  The thoracic aorta is normal. The inferior vena cava is normal.     Pericardium  No pericardial effusion is present.     Miscellaneous  No significant valvular abnormalities present. Ascites is noted.     Compared to Previous Study  There is no prior study for direct comparison.  ______________________________________________________________________________  MMode/2D Measurements & Calculations  IVSd: 1.1 cm  LVIDd: 4.8 cm  LVIDs: 2.6 cm  LVPWd: 1.0 cm  FS: 45.4 %  LV mass(C)d: 187.3 grams  LV mass(C)dI: 102.2 grams/m2  Ao root diam: 2.8 cm  asc Aorta Diam: 2.6 cm  LVOT diam: 2.1 cm  LVOT area: 3.5 cm2  Ao root diam index Ht(cm/m): 1.8  Ao root diam index BSA (cm/m2): 1.5  Asc Ao diam index BSA (cm/m2): 1.4  Asc Ao diam index Ht(cm/m): 1.6  LA Volume (BP): 70.2 ml     LA Volume Index (BP): 38.4 ml/m2  RWT: 0.43  TAPSE: 2.3 cm     Doppler Measurements & Calculations  Ao V2 max: 210.0 cm/sec  Ao max P.6 mmHg  Ao V2 mean: 146.0 cm/sec  Ao mean PG: 10.0 mmHg  Ao V2 VTI: 34.8 cm  ANNITA(I,D): 2.9 cm2  ANNITA(V,D): 2.9 cm2  LV V1 max P.0 mmHg  LV V1 max: 173.0 cm/sec  LV V1 VTI: 28.8 cm  SV(LVOT): 101.1 ml  SI(LVOT): 55.2 ml/m2  AV Randall Ratio (DI): 0.82  ANNITA Index (cm2/m2): 1.6  RV S Randall: 14.5 cm/sec      ______________________________________________________________________________  Report approved by: Radha Hernandez 12/11/2023 11:07 AM

## 2023-12-11 NOTE — CONSULTS
[facility]   Consult Note - Addiction Service     Date of Admission:  12/10/2023    Consult Requested by: SOPHIA Brown  Reason for Consult: Alcohol use disorder, establish care with Addiction Medicine Service    Assessment & Plan   Priya Castano is a 28F with a history of alcohol use disorder and cirrhosis (presumed EtOH related) who was transferred to [facility] on 12/10 from [facility] for decompensated liver disease and acute kidney injury. She was transferred primarily for specialty evaluation and ongoing workup related to transplant.     # Alcohol Use Disorder  # Decompensated Liver Disease  # Acute Kidney Injury  Priya has a past history of heavy alcohol use, though denies any intake of alcohol since 7/27/23. She went through a 30 day program at [facility] in [place], [place] at that time, and then enrolled in their evening program for 2 days weekly with transition to weekly visits. She says that she still continues to work with this program. She says she occasionally has cravings for alcohol use, but that she has learned methods to help distract herself (calling friends). Feels she has good social support at this time for continued abstinence from alcohol. She is interested in considering medication assistance as well. At present, she is not requiring opioid pain medications though does have FELIX. Naltrexone may be a good therapeutic option for the patient once determined that her liver disease is stable. We also discussed acamprosate as a second line agent, but currently would defer due to renal function.    -Will continue to follow up with patient during hospitalization and consider timing for starting medication therapy (likely naltrexone) in consultation with primary team and hepatology service.  -Agree with trending LFTs and synthetic function to help determine stability in liver disease.    # Mental health: History of PTSD and Anxiety  -Agree with health  psychology consultation  -Agree with use of selective serotonin reuptake inhibitor and prn hydroxine    # Harm Reduction:  -Can consider checking hepatitis C serologies (nonreactive 7/27/23)  -HIV nonreactive 7/29/23; she does not endorse high risk behaviors on interview    # Immunization review:   -Can consider checking Hep A IgG; has completed primary 2-dose series in 2012 per [facility]  -Can consider checking hepatitis B serologies; acute panel nonreactive 7/27/23, has completed vaccine series per [facility]    # Peer Support:   -Our peer  will meet the patient if agreeable and still hospitalized on Thursday, to provide additional outpatient resources  -To contact Yolette Peer  from [facility] ([facility]): call or text: [phone]    # Addiction Social Worker:   Our addiction social worker Anirudh Coyne can be contacted if needed, on her pager [phone] or texted/called at [phone]    # Linkage to Care:   -Connected with  hepatology and undergoing transplant workup. PCP recently established at [facility], last seen in 8/2023.  -Currently connected with outpatient program at [facility] in [place].     The patient's care was discussed with the Patient and Primary team.    I spent 120 minutes on the unit/floor managing the care of Priya Castano. Over 50% of my time was spent on the following:   Significant education and counseling spent on: how substance use disorders and dependence occur, and how it can become a chronic relapsing and remitting medical condition.  In addition, the pharmacology of medical treatments including naltrexone and acamprosate, the importance of follow up, and Harm Reduction advice on how to use substances in a less harmful way why trying to cut down were discussed today.      Amrik Sky MD  [facility]   Contact information available via [facility] Paging/Directory  Please see sign in/sign out for up to date  coverage information    ChAT team (Addiction Consult Team): Coverage Monday-Friday 8-4pm     ______________________________________________________________________    Chief Complaint   Medical therapy for alcohol use disorder cravings    History is obtained from the patient    History of Present Illness   Priya Castano is a 28F with a history of alcohol use disorder and cirrhosis (presumed EtOH related) who was transferred to [facility] on 12/10 from [facility] for decompensated liver disease and acute kidney injury. She was transferred primarily for specialty evaluation and ongoing workup related to transplant.    Priya has a past history of heavy alcohol use, though denies any intake of alcohol since 7/27/23. She went through a 30 day program at [facility] in [place], [place] at that time, and then enrolled in their evening program for 2 days weekly with transition to weekly visits. She says that she still continues to work with this program. She says she occasionally has cravings for alcohol use, but that she has learned methods to help distract herself (calling friends). Feels she has good social support at this time for continued abstinence from alcohol. She is interested in considering medication assistance as well.     Drug/Substance of Choice:  alcohol    Denies use of other substances, including tobacco. Was in a long term relationship with a partner but recently broke up. Is housed and feels safe in her living environment.    Review of Systems   The 10 point Review of Systems is negative other than noted in the HPI or here.     Past Medical History:   Diagnosis Date    Alcoholic cirrhosis of liver with ascites (H)     Hypothyroidism     SBP (spontaneous bacterial peritonitis) (H)        Past Surgical History:   Procedure Laterality Date    IR PARACENTESIS  7/28/2023       Social History   Social History     Socioeconomic History    Marital status: Single     Spouse name: Not on file    Number of children: Not on  file    Years of education: Not on file    Highest education level: Not on file   Occupational History    Not on file   Tobacco Use    Smoking status: Never    Smokeless tobacco: Never   Substance and Sexual Activity    Alcohol use: Yes     Comment: 1 bottle wine daily    Drug use: No    Sexual activity: Never   Other Topics Concern    Parent/sibling w/ CABG, MI or angioplasty before 65F 55M? No   Social History Narrative    ** Merged History Encounter **         FAMILY INFORMATION     Date: 2006    Parent #1      Name: Pema Castano   Gender: female   : 1977      Education: Not listed   Occupation:         Parent #2      Name: Not listed   Gender: male   : Not listed     Education: Not listed   Occupation: Not listed        Siblings: not listed        Relationship Status of Parent(s):     Who does the child live with? mother and father    What language(s) is/are spoken at home? Bhutanese             Social Determinants of Health     Financial Resource Strain: Not on file   Food Insecurity: Not on file   Transportation Needs: Not on file   Physical Activity: Not on file   Stress: Not on file   Social Connections: Not on file   Interpersonal Safety: Not on file   Housing Stability: Not on file       Family History   I have reviewed this patient's family history and updated it with pertinent information if needed.  Family History   Problem Relation Age of Onset    Thyroid Disease Mother          Medications   I have reviewed this patient's current medications    Allergies   No Known Allergies    Physical Exam   Temp: 98.8  F (37.1  C) Temp src: Oral BP: 115/64 Pulse: 111   Resp: 16 SpO2: 99 % O2 Device: None (Room air)        General Appearance: NAD, pleasant, interactive, jaundiced  Respiratory: CTAB without signs of respiratory distress  Cardiovascular: RRR no appreciated MRG  GI: Distended  Skin: WWP, jaundiced  Other: Linear thought processes, no appreciated focal neurologic  deficits      Due to regulation of Title 42 of the Code of Federal Regulations (CFR) Part 2: Confidentiality laws apply to this note and the information wherein.  Thus, this note cannot be copy and pasted into any other health care staff's note nor can it be included in general medical records sent to ANY outside agency without the patient's written consent.

## 2023-12-11 NOTE — H&P
"[facility]    History and Physical - Hospitalist Service, GOLD TEAM        Date of Admission:  12/10/2023    Assessment & Plan      Priya Castano is a 28 year old female w/ cirrhosis (currently attributed to alcohol use) admitted on 12/10/2023. She is a transfer from [facility] for further workup for worsening kidney function ISO liver cirrhosis and planning for transplant workup. GI and nephrology consulted on admission, appreciate assistance.    #Cirrhosis w/ ascites   #Hyponatremia  #Hyperbilirubinemia, elevated INR  [facility] Dr. Mendez. Currently undergoing liver transplant workup.   Received paracentesis on , , 11/10. Outpatient, removing 5-6L,  followed by albumin and previously on spironolactone and furosemide PTA (stopped  concern for prerenal FELIX as below). No evidence of SBP on ascites fluid analysis, fluid cx NGTD (para ). Treated w/ IV ceftriaxone (- ) and IV vancomycin (- ).   Documentation thus far suggests alcoholic etiology- 4-5 years heavy drinking, usually 1 bottle of wine per night, sometimes 2. Sober since 2723. Family hx of alcoholism, uncle who  from \"kidney failure\", but no known hepatic disease in the family. Timeline and quantity of alcohol consumption irregular, other etiology for cirrhosis needs to be considered, such as genetic disease.  On admission, VSS. BP normotensive, nephrology @ [facility] w/ c/f \"unrecognized HoTN\", started pt on midodrine TID.   Labs as below. Notably, pt w/ spontaneous hematoma of RUE, INR 2.57. S/p 3 doses vitamin K.    MELD 3.0: 34 at 2023  7:19 AM  MELD-Na: 32 at 2023  7:19 AM  Calculated from:  Serum Creatinine: 2.46 mg/dL at 2023  7:19 AM  Serum Sodium: 131 mmol/L at 2023  7:19 AM  Total Bilirubin: 2.7 mg/dL at 2023  6:30 AM  Serum Albumin: 1.8 g/dL at 2023  6:30 AM  INR(ratio): 2.57 at 2023  6:30 " AM  Age at listing (hypothetical): 28 years  Sex: Female at 12/9/2023  7:19 AM    - Hepatology consult placed, appreciate recs  - Daily MELD labs  - Daily CMP  - Additional cirrhosis workup: ferritin, cerruloplasmin, alpha 1 anti, HFE gene mutation, fibrinogen, repeat coags, abdominal us w/ dopplers   -- Patient signed HFE genetic testing consent form evening of 12/10/23; 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  - Continue midodrine 2.5mg TID for now, consider discontinuation if HRS less likely  - Not on octreotide on admission, will not start today    #FELIX, HRS vs ATN  #Hyponatremia  Presented w/ abdominal pain, distension s/p paracentesis 12/5 w/ 3.6L removed-- no evidence of SBP on fluid analysis, fluid cx NGTD.   Treated w/ IV ceftriaxone (12/5- 12/8) and IV vancomycin (12/5- 12/6).   Seen by nephrology at [facility]-- suspected FELIX likely 2/2 high volume paracenteses and over-diuresis. However pt now s/p albumin challenge (6 50g 25% albumin doses since 12/7), PTA diuretics held, yet Cr continues to worsen. Urine sodium 20, not consistent w/ HRS, ATN also possible dx.  Low Na in setting of liver disease, c/f intravascular hypovolemia per nephrology @ [facility]. Na WNL until November 2023-- now baseline ~130-132.     Creatinine   Date Value Ref Range Status   12/10/2023 2.56 (H) 0.51 - 0.95 mg/dL Final   12/09/2023 2.46 (H) 0.51 - 0.95 mg/dL Final   12/08/2023 2.30 (H) 0.51 - 0.95 mg/dL Final   12/07/2023 1.87 (H) 0.51 - 0.95 mg/dL Final   12/06/2023 1.11 (H) 0.51 - 0.95 mg/dL Final   12/05/2023 0.80 0.51 - 0.95 mg/dL Final   12/04/2023 0.67 0.51 - 0.95 mg/dL Final   11/21/2023 0.73 0.51 - 0.95 mg/dL Final     - Nephrology consult placed, appreciate recs  - HOLD all nephrotoxic medications  - Daily CMP to trend renal function and electrolytes  - Strict I&Os to monitor urine output  - Renal US  - Repeat UA w/ microscopic  - Urine studies: protein, sodium    #Anemia, chronic w/ acute  "drop, improved  #Thrombocytopenia  On admission to [facility] 12/4, hgb 5.5, received 3U RBCs, hgb up to nearly 8, now hovering just above 7. Normocytic. Plt 105 on admission. Both iso cirrhosis.  - Daily CBC to monitor hgb for now  - Transfuse < 7    #RSV positive 12/5, c/f associated sepsis, improved  Admitted to [facility] 12/4/23 w/ c/f sepsis, RSV positive, also tx for presumed SBP (now ruled out). Presented w/ tachycardia (123), leukocytosis (16.2). Treated w/ IV ceftriaxone (12/5- 12/8) and IV vancomycin (12/5- 12/6).  Supportive cares for RSV. No documentation of need for supplemental O2.   Leukocytosis still present but improving, now 12.6 even w/o abx on board. Vital signs continue to be stable. Symptoms improving, residual cough still present.   - Supportive cares for RSV-- IS, guaifenesin prn  - Continue to monitor WBC    #Systolic murmur  Appreciated on exam, Grade III.   - Echocardiogram    #Acquired hypothyroidism due to Hashimoto's thyroiditis  Elevated TSH, normal free T4 12.5.  - Noted, no PTA meds at this time          Diet: 2 Gram Sodium Diet  DVT Prophylaxis: Pneumatic Compression Devices  Short Catheter: Not present  Lines: None     Cardiac Monitoring: None  Code Status: Full Code    Clinically Significant Risk Factors Present on Admission          # Hypocalcemia: Lowest Ca = 8.1 mg/dL in last 2 days, will monitor and replace as appropriate        # Coagulation Defect: INR = 2.42 (Ref range: 0.85 - 1.15) and/or PTT = N/A, will monitor for bleeding  # Thrombocytopenia: Lowest platelets = 105 in last 2 days, will monitor for bleeding  # Acute Kidney Injury, unspecified: based on a >150% or 0.3 mg/dL increase in last creatinine compared to past 90 day average, will monitor renal function       # Obesity: Estimated body mass index is 32.7 kg/m  as calculated from the following:    Height as of 12/4/23: 1.588 m (5' 2.5\").    Weight as of an earlier encounter on 12/10/23: 82.4 kg (181 lb 11.2 oz).    " "          Disposition Plan      Expected Discharge Date: 2023                The patient's care was discussed with the Attending Physician, Dr. Arenas .    Julius Pickard PA-C  Hospitalist Service, [facility]  Securely message with [employer] (more info)  Text page via [facility] Paging/Directory   See signed in provider for up to date coverage information    ______________________________________________________________________    Chief Complaint   Abdominal pain, ascites--> transfer from [facility]    History is obtained from the patient, chart review.    History of Present Illness   Priya Castano is a 28 year old female who has a PMH of liver cirrhosis transferred to [facility] from [facility] on 12/10/23 for further workup for worsening renal function in setting of cirrhosis.    Please see the discharge summary dated 12/10/23 from [facility] for a helpful overview of Priya's hospital course. The nephrology consult notes are also very helpful.     In short, Priya presented to [facility] w/ abdominal pain and distension. She was admitted w/ c/f sepsis. Found to be RSV positive and treated for presumed SBP until it was determined she did not have SBP. She underwent paracentesis, which helped w/ the abdominal discomfort. Kidney function began to worsen and continued to decline despite stopping diuretics and albumin administration. She is transferred for further workup by nephrology and GI.     Priya reports being diagnosed w/ cirrhosis in /July of this year. Reports a drinking history of 4-5 years, drinking 1 bottle of wine daily, sometimes 2 bottles. Family history of alcoholism. No known hx of liver disease. Does have an uncle who  of \"kidney failure\", but she does not know details. Part of her family still lives in [place], so she is uncertain if they have chronic or genetic conditions as \"medicine there isn't what it is here\". " Regarding symptoms, states her belly does not feel overly distended or uncomfortable. She has a residual cough, but is feeling improved from an RSV perspective. No SOB, no chest pain, no lower extremity edema.       Past Medical History    Past Medical History:   Diagnosis Date    Alcoholic cirrhosis of liver with ascites (H)     Hypothyroidism     SBP (spontaneous bacterial peritonitis) (H)        Past Surgical History   Past Surgical History:   Procedure Laterality Date    IR PARACENTESIS  7/28/2023       Prior to Admission Medications   Prior to Admission Medications   Prescriptions Last Dose Informant Patient Reported? Taking?   furosemide (LASIX) 20 MG tablet  Self No No   Sig: Take 2 tablets (40 mg) by mouth daily   spironolactone (ALDACTONE) 100 MG tablet  Self Yes No   Sig: Take 100 mg by mouth daily      Facility-Administered Medications: None        Review of Systems    The 10 point Review of Systems is negative other than noted in the HPI or here.     Social History   I have reviewed this patient's social history and updated it with pertinent information if needed.  Social History     Tobacco Use    Smoking status: Never    Smokeless tobacco: Never   Substance Use Topics    Alcohol use: Yes     Comment: 1 bottle wine daily    Drug use: No         Family History   I have reviewed this patient's family history and updated it with pertinent information if needed.  Family History   Problem Relation Age of Onset    Thyroid Disease Mother          Allergies   Allergies   Allergen Reactions    Bee Venom Hives and Shortness Of Breath     ------------------------------------------------------------------------     Physical Exam   Vital Signs: Temp: 98.7  F (37.1  C) Temp src: Axillary BP: 131/74 Pulse: 112   Resp: 20 SpO2: 100 % O2 Device: None (Room air)    Weight: 0 lbs 0 oz    General Appearance: Pt is reclining in bed, comfortable, NAD.  HEENT: Normocephalic. EOMI. Mild rhinorrhea and occasional throat  clearing.   Respiratory: Lungs w/ very mild crackles, mostly in LLL. Breathing comfortably on room air.   Cardiovascular: Systolic murmur appreciated, Grade III. RRR.   GI: Abdomen distended w/ some ascites, but generally soft. No guarding, no rigidity. No tenderness to palpation. Skin changes related to paracentesis on R upper abdomen, no e/o infection.   Lymph/Hematologic: Bruising noted on RUE. No LE edema.  Skin: Jaundiced. Warm & dry.   Musculoskeletal: Moving major joints w/o issue, normal ROM.  Neurologic: Alert and oriented. No focal deficits. No e/o encephalopathy.   Psychiatric: Interactive and cooperative w/ provider. Appropriate mood and affect.     Medical Decision Making       80 MINUTES SPENT BY ME on the date of service doing chart review, history, exam, documentation & further activities per the note.      Data   ------------------------- PAST 24 HR DATA REVIEWED -----------------------------------------------    I have personally reviewed the following data over the past 24 hrs:    12.6 (H)  \   7.2 (L)   / 110 (L)     132 (L) 100 8.6 /  100 (H)   3.9 22 2.56 (H) \     ALT: N/A AST: N/A AP: N/A TBILI: N/A   ALB: 3.6 TOT PROTEIN: N/A LIPASE: N/A       Imaging results reviewed over the past 24 hrs:   No results found for this or any previous visit (from the past 24 hour(s)).  Recent Labs   Lab 12/10/23  0742 12/09/23  0719 12/08/23  1354 12/08/23  0719 12/07/23  0630 12/06/23  1424 12/06/23  0709 12/05/23  1328 12/05/23  0517   WBC 12.6* 13.0*  --   --  15.1*  --   --   --  16.8*   HGB 7.2* 7.8*  --   --  7.4*   < >  --    < > 7.0*   MCV 89 89  --   --  87  --   --   --  88   * 105*  --   --  111*  --   --   --  160   INR  --   --  2.42*  --  2.57*  --   --   --  2.38*   * 131*  --  129* 130*  --  132*  --  131*   POTASSIUM 3.9 3.8 3.7 3.4 3.5   < > 3.0*  --  4.0   CHLORIDE 100 100  --  99 101  --  101  --  101   CO2 22 22  --  21* 21*  --  24  --  19*   BUN 8.6 8.3  --  8.3 7.4  --   5.4*  --  5.5*   CR 2.56* 2.46*  --  2.30* 1.87*  --  1.11*  --  0.80   ANIONGAP 10 9  --  9 8  --  7  --  11   JULIAN 8.4* 8.1*  --  7.3* 6.9*  --  7.3*  --  7.1*   * 87  --  95 97  --  80  --  107*   ALBUMIN 3.6  --   --   --  1.8*  --  2.3*  --  2.3*   PROTTOTAL  --   --   --   --  5.8*  --  6.9  --  7.0   BILITOTAL  --   --   --   --  2.7*  --  4.4*  --  3.7*   ALKPHOS  --   --   --   --  192*  --  235*  --  238*   ALT  --   --   --   --  24  --  29  --  34   AST  --   --   --   --  97*  --  131*  --  153*    < > = values in this interval not displayed.

## 2023-12-12 ENCOUNTER — MEDICAL CORRESPONDENCE (OUTPATIENT)
Dept: HEALTH INFORMATION MANAGEMENT | Facility: CLINIC | Age: 28
End: 2023-12-12

## 2023-12-12 ENCOUNTER — HOME INFUSION (PRE-WILLOW HOME INFUSION) (OUTPATIENT)
Dept: PHARMACY | Facility: CLINIC | Age: 28
End: 2023-12-12

## 2023-12-12 ENCOUNTER — COMMITTEE REVIEW (OUTPATIENT)
Dept: TRANSPLANT | Facility: CLINIC | Age: 28
End: 2023-12-12

## 2023-12-12 LAB
ABO/RH(D): NORMAL
ALBUMIN SERPL BCG-MCNC: 3 G/DL (ref 3.5–5.2)
ALP SERPL-CCNC: 90 U/L (ref 40–150)
ALT SERPL W P-5'-P-CCNC: 8 U/L (ref 0–50)
ANION GAP SERPL CALCULATED.3IONS-SCNC: 9 MMOL/L (ref 7–15)
ANTIBODY SCREEN: NEGATIVE
AST SERPL W P-5'-P-CCNC: 54 U/L (ref 0–45)
BASOPHILS # BLD AUTO: 0.1 10E3/UL (ref 0–0.2)
BASOPHILS NFR BLD AUTO: 1 %
BILIRUB DIRECT SERPL-MCNC: 1.84 MG/DL (ref 0–0.3)
BILIRUB SERPL-MCNC: 3.7 MG/DL
BUN SERPL-MCNC: 11.8 MG/DL (ref 6–20)
CALCIUM SERPL-MCNC: 8.1 MG/DL (ref 8.6–10)
CHLORIDE SERPL-SCNC: 102 MMOL/L (ref 98–107)
CREAT SERPL-MCNC: 2.37 MG/DL (ref 0.51–0.95)
DEPRECATED HCO3 PLAS-SCNC: 20 MMOL/L (ref 22–29)
EGFRCR SERPLBLD CKD-EPI 2021: 28 ML/MIN/1.73M2
EOSINOPHIL # BLD AUTO: 0.3 10E3/UL (ref 0–0.7)
EOSINOPHIL NFR BLD AUTO: 2 %
ERYTHROCYTE [DISTWIDTH] IN BLOOD BY AUTOMATED COUNT: 23.6 % (ref 10–15)
ERYTHROCYTE [DISTWIDTH] IN BLOOD BY AUTOMATED COUNT: 23.8 % (ref 10–15)
FOLATE SERPL-MCNC: 3 NG/ML (ref 4.6–34.8)
GLUCOSE SERPL-MCNC: 94 MG/DL (ref 70–99)
HCT VFR BLD AUTO: 21.2 % (ref 35–47)
HCT VFR BLD AUTO: 23.5 % (ref 35–47)
HGB BLD-MCNC: 6.6 G/DL (ref 11.7–15.7)
HGB BLD-MCNC: 7.5 G/DL (ref 11.7–15.7)
IMM GRANULOCYTES # BLD: 0.1 10E3/UL
IMM GRANULOCYTES NFR BLD: 1 %
INR PPP: 2.29 (ref 0.85–1.15)
LABORATORY REPORT: NORMAL
LDH SERPL L TO P-CCNC: 213 U/L (ref 0–250)
LDH SERPL L TO P-CCNC: 246 U/L (ref 0–250)
LYMPHOCYTES # BLD AUTO: 2.6 10E3/UL (ref 0.8–5.3)
LYMPHOCYTES NFR BLD AUTO: 18 %
MCH RBC QN AUTO: 28.6 PG (ref 26.5–33)
MCH RBC QN AUTO: 29.1 PG (ref 26.5–33)
MCHC RBC AUTO-ENTMCNC: 31.1 G/DL (ref 31.5–36.5)
MCHC RBC AUTO-ENTMCNC: 31.9 G/DL (ref 31.5–36.5)
MCV RBC AUTO: 91 FL (ref 78–100)
MCV RBC AUTO: 92 FL (ref 78–100)
MONOCYTES # BLD AUTO: 1.7 10E3/UL (ref 0–1.3)
MONOCYTES NFR BLD AUTO: 12 %
NEUTROPHILS # BLD AUTO: 9.2 10E3/UL (ref 1.6–8.3)
NEUTROPHILS NFR BLD AUTO: 66 %
NRBC # BLD AUTO: 0 10E3/UL
NRBC BLD AUTO-RTO: 0 /100
PETH INTERPRETATION: NORMAL
PLATELET # BLD AUTO: 140 10E3/UL (ref 150–450)
PLATELET # BLD AUTO: 165 10E3/UL (ref 150–450)
PLPETH BLD-MCNC: 386 NG/ML
POPETH BLD-MCNC: 405 NG/ML
POTASSIUM SERPL-SCNC: 4.2 MMOL/L (ref 3.4–5.3)
PROT SERPL-MCNC: 5.7 G/DL (ref 6.4–8.3)
RBC # BLD AUTO: 2.31 10E6/UL (ref 3.8–5.2)
RBC # BLD AUTO: 2.58 10E6/UL (ref 3.8–5.2)
RETICS # AUTO: 0.09 10E6/UL (ref 0.03–0.1)
RETICS # AUTO: 0.09 10E6/UL (ref 0.03–0.1)
RETICS/RBC NFR AUTO: 3.3 % (ref 0.5–2)
RETICS/RBC NFR AUTO: 3.7 % (ref 0.5–2)
SODIUM SERPL-SCNC: 131 MMOL/L (ref 135–145)
SPECIMEN EXPIRATION DATE: NORMAL
VIT B12 SERPL-MCNC: 983 PG/ML (ref 232–1245)
WBC # BLD AUTO: 12.6 10E3/UL (ref 4–11)
WBC # BLD AUTO: 14 10E3/UL (ref 4–11)

## 2023-12-12 PROCEDURE — G0145 SCR C/V CYTO,THINLAYER,RESCR: HCPCS | Performed by: STUDENT IN AN ORGANIZED HEALTH CARE EDUCATION/TRAINING PROGRAM

## 2023-12-12 PROCEDURE — 250N000013 HC RX MED GY IP 250 OP 250 PS 637: Performed by: STUDENT IN AN ORGANIZED HEALTH CARE EDUCATION/TRAINING PROGRAM

## 2023-12-12 PROCEDURE — 99231 SBSQ HOSP IP/OBS SF/LOW 25: CPT | Mod: GC | Performed by: STUDENT IN AN ORGANIZED HEALTH CARE EDUCATION/TRAINING PROGRAM

## 2023-12-12 PROCEDURE — 99207 PR APP CREDIT; MD BILLING SHARED VISIT: CPT | Performed by: INTERNAL MEDICINE

## 2023-12-12 PROCEDURE — 82248 BILIRUBIN DIRECT: CPT

## 2023-12-12 PROCEDURE — 99233 SBSQ HOSP IP/OBS HIGH 50: CPT | Mod: FS | Performed by: INTERNAL MEDICINE

## 2023-12-12 PROCEDURE — 99418 PROLNG IP/OBS E/M EA 15 MIN: CPT | Mod: FS | Performed by: INTERNAL MEDICINE

## 2023-12-12 PROCEDURE — 86923 COMPATIBILITY TEST ELECTRIC: CPT | Performed by: STUDENT IN AN ORGANIZED HEALTH CARE EDUCATION/TRAINING PROGRAM

## 2023-12-12 PROCEDURE — 82607 VITAMIN B-12: CPT | Performed by: STUDENT IN AN ORGANIZED HEALTH CARE EDUCATION/TRAINING PROGRAM

## 2023-12-12 PROCEDURE — 85027 COMPLETE CBC AUTOMATED: CPT | Performed by: STUDENT IN AN ORGANIZED HEALTH CARE EDUCATION/TRAINING PROGRAM

## 2023-12-12 PROCEDURE — 82525 ASSAY OF COPPER: CPT | Performed by: NURSE PRACTITIONER

## 2023-12-12 PROCEDURE — 85610 PROTHROMBIN TIME: CPT | Performed by: STUDENT IN AN ORGANIZED HEALTH CARE EDUCATION/TRAINING PROGRAM

## 2023-12-12 PROCEDURE — 85060 BLOOD SMEAR INTERPRETATION: CPT | Performed by: PATHOLOGY

## 2023-12-12 PROCEDURE — 83615 LACTATE (LD) (LDH) ENZYME: CPT

## 2023-12-12 PROCEDURE — 120N000002 HC R&B MED SURG/OB UMMC

## 2023-12-12 PROCEDURE — 85025 COMPLETE CBC W/AUTO DIFF WBC: CPT | Performed by: STUDENT IN AN ORGANIZED HEALTH CARE EDUCATION/TRAINING PROGRAM

## 2023-12-12 PROCEDURE — 82746 ASSAY OF FOLIC ACID SERUM: CPT | Performed by: STUDENT IN AN ORGANIZED HEALTH CARE EDUCATION/TRAINING PROGRAM

## 2023-12-12 PROCEDURE — 86900 BLOOD TYPING SEROLOGIC ABO: CPT | Performed by: STUDENT IN AN ORGANIZED HEALTH CARE EDUCATION/TRAINING PROGRAM

## 2023-12-12 PROCEDURE — 99232 SBSQ HOSP IP/OBS MODERATE 35: CPT | Performed by: STUDENT IN AN ORGANIZED HEALTH CARE EDUCATION/TRAINING PROGRAM

## 2023-12-12 PROCEDURE — 250N000011 HC RX IP 250 OP 636: Mod: JZ | Performed by: STUDENT IN AN ORGANIZED HEALTH CARE EDUCATION/TRAINING PROGRAM

## 2023-12-12 PROCEDURE — 80053 COMPREHEN METABOLIC PANEL: CPT | Performed by: STUDENT IN AN ORGANIZED HEALTH CARE EDUCATION/TRAINING PROGRAM

## 2023-12-12 PROCEDURE — 36415 COLL VENOUS BLD VENIPUNCTURE: CPT | Performed by: STUDENT IN AN ORGANIZED HEALTH CARE EDUCATION/TRAINING PROGRAM

## 2023-12-12 PROCEDURE — 85045 AUTOMATED RETICULOCYTE COUNT: CPT | Performed by: STUDENT IN AN ORGANIZED HEALTH CARE EDUCATION/TRAINING PROGRAM

## 2023-12-12 RX ORDER — CEFAZOLIN SODIUM 2 G/100ML
2 INJECTION, SOLUTION INTRAVENOUS EVERY 8 HOURS
Status: DISCONTINUED | OUTPATIENT
Start: 2023-12-12 | End: 2023-12-14 | Stop reason: HOSPADM

## 2023-12-12 RX ORDER — FOLIC ACID 1 MG/1
1 TABLET ORAL DAILY
Status: DISCONTINUED | OUTPATIENT
Start: 2023-12-12 | End: 2023-12-14 | Stop reason: HOSPADM

## 2023-12-12 RX ADMIN — OXYCODONE HYDROCHLORIDE 5 MG: 5 TABLET ORAL at 21:07

## 2023-12-12 RX ADMIN — CEFAZOLIN SODIUM 2 G: 2 INJECTION, SOLUTION INTRAVENOUS at 06:05

## 2023-12-12 RX ADMIN — CEFAZOLIN SODIUM 2 G: 2 INJECTION, SOLUTION INTRAVENOUS at 13:54

## 2023-12-12 RX ADMIN — FOLIC ACID 1 MG: 1 TABLET ORAL at 15:01

## 2023-12-12 RX ADMIN — OXYCODONE HYDROCHLORIDE 5 MG: 5 TABLET ORAL at 13:09

## 2023-12-12 RX ADMIN — OXYCODONE HYDROCHLORIDE 5 MG: 5 TABLET ORAL at 04:16

## 2023-12-12 RX ADMIN — CEFAZOLIN SODIUM 2 G: 2 INJECTION, SOLUTION INTRAVENOUS at 21:07

## 2023-12-12 RX ADMIN — ESCITALOPRAM 5 MG: 5 TABLET, FILM COATED ORAL at 08:37

## 2023-12-12 ASSESSMENT — ACTIVITIES OF DAILY LIVING (ADL)
ADLS_ACUITY_SCORE: 20
DEPENDENT_IADLS:: INDEPENDENT
ADLS_ACUITY_SCORE: 20

## 2023-12-12 NOTE — PROGRESS NOTES
Brief Progress Note    Was requested by the primary team to perform a pap smear in anticipation of a transplant. I presented to the patients room and introduced myself to the patient. I spoke with the nursing staff and requested a chaperone. Unfortunately, the staff was occupied in sign out and was unable to chaperone the exam. After discussing with the patient, I let her know that I would return later to perform her pap smear.     Harjinder Lovett MD  OBGYN PGY-4  December 12, 2023 3:22 PM

## 2023-12-12 NOTE — PROGRESS NOTES
Psychosocial Assessment for Liver Transplant-Consult  Priya Castano was seen on Station 7C as part of her evaluation as a potential liver transplant recipient.  She was interviewed alone.  Living Situation: Priya currently lives with her Marquise and Pema Castano (uncle and aunt) at their home in [place], [place].  They legally adopted her when she was [age] years of age.  She moved in with them one week ago, and has lived with them on and off her whole life.  She was living in an apartment in [place], [place] prior but does not plan to return to that living situation.  Priya reports being independent with her personal cares, ambulation and medication management.  If she could not drive herself to an appointment her Aunt Pema would provide transportation.    Education/Employment:  Priya graduated high school and attended some college at the [employer] and [employer].  She obtained an associates degree in [employer].  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  Priya reports her employer has been very supportive of her chemical dependency treatment and medical needs.  Priya plans to return to work when she is well enough to work.  Financial /Income: Priya has been receiving income from her employment.  We discussed applying for SSDI briefly, and may need to revisit this.  She has no short or long term disability benefits.  She has money in savings and custodial.  Health Insurance:  Priya is uninsured, and has been uninsured for many months (exact month not known).   Priya was screened for Medical Assistance and M[place]are prior to transferring to this hospital and she does not qualify for either program.  I assisted her in choosing [facility].  She selected [employer] [place] Gold health plan which will be effective January 1st.  Her premium will be $231.31 per month.  Priya  "understands the deductible and annual maximum out of pocket costs.  This writer talked with Priya about the financial risks of transplant, particularly about the high cost of transplant related medications and the importance of maintaining adequate health insurance coverage.  Family/Social Support: Priya's primary support comes from her Uncle Marquise and Aunt Pema who she is currently living with.  She calls them her parents as they have always been significant in her life.  Priya has never  and she is not currently in a relationship.  Priya does not have a dependent(s).  Her mother is , and her father lives in [place].  She has two brothers (not biological).  Marquise lives in [place], [place] and Abhishek lives in [place], [place].  This writer stressed the importance of having a stable and involved support network before and after transplant.  Provided Priya  with education about the relationship between a stable support system and better surgical and post-transplant outcomes compared to patients with a limited support system.    Functional Status: There are no functional concerns.  Priya denies any recent falls.  Chemical Dependency:  Priya denies any history of using tobacco products or illicit drug use.  She denies any history of pain medication abuse.    Priya reports her last use of alcohol was 2023.  She had been drinking daily, 1-2 bottles of wine per day.  Priya reports her use was this heavy for approximately two to three years.  Priya reports she used alcohol as a coping mechanism.She went through detox the end of 2023 and then transferred to the [facility] in [place], [place] where she reports completing their thirty day residential treatment program.  She then completed a twice weekly outpatient program for six weeks, and is currently engaged in their evening program once weekly (will be for a total of 16 weeks).  This group meets in person.  Mental Health: Priya reports she is a \"very " "anxious person.\" Her medical record documents anxiety and PTSD.  She was seen by Dr. Amrik Sky this admission and started on lexapro.  She has been taking atarax this admission.  Priya denies any history of suicidal ideation, or hospitalization for mental health treatment.   Impression/Recommendations:   Priya is currently uninsured. I assisted her in choosing a [facility] subsidized health plan.  She selected [employer] [place] Gold health plan which will be effective January 1st.  She does not qualify for Medical Assistance, unless determined to be disabled for one year or greater.  Priya will also apply for our [facility] care program to assist with her medical bills this year.    Priya has been sober from alcohol since 7/28/2023.  Priya reports she completed a thirty day residential program at the [facility] in [place].  Priya also reports she remains engaged in their weekly evening program.  A RACHAEL was obtained from patient.  I contacted the [facility] to obtain records, and faxed the RACHAEL to them.  I will review the records once received.   The Caregiver Agreement for Liver Transplantation was discussed and will need to be reviewed with Priya's identified care giver(s).  Her mother Pema and father Marquise are her primary identified care givers.  I will complete care giver education on Wednesday of this week.  I provided a Health Care Directive and encouraged Priya to complete a directive and I offered to provide a notary.    Priya has adequate finances for transplant.  This writer will remain available to assist patient to prepare for liver transplantation evaluation.        CARLINE Reyes, [facility]  Liver Transplant   Phone [phone]  Pager [phone]     "

## 2023-12-12 NOTE — PLAN OF CARE
Started 24h urine collection (end: 12/13) Consult to OB/GYN for pre transplant cervical cancer screening. False-critical Hgb, recheck completed with value similar to day before (7.5). Abx increased to q8h due to elevation in WBC. Oxy for abdomen pain.

## 2023-12-12 NOTE — PROGRESS NOTES
"[facility]     Addiction Progress Note - Addiction Service        Date of Admission:  12/10/2023  Assessment & Plan       Priya Castano is a 28F with a history of alcohol use disorder (in remission) and cirrhosis (presumed EtOH related) who was transferred to [facility] on 12/10 from [facility] for decompensated liver disease and acute kidney injury. She was transferred primarily for specialty evaluation and ongoing workup related to transplant.      # Alcohol Use Disorder  # Decompensated Liver Disease  # Acute Kidney Injury  Priya has a past history of heavy alcohol use, though denies any intake of alcohol since 7/27/23. She went through a 30 day program at [facility] in [place], [place] at that time, and then enrolled in their evening program for 2 days weekly with transition to weekly visits. She says that she still continues to work with this program. She now has methods to help distract herself from cravings (calling friends). Feels she has good social support at this time for continued abstinence from alcohol. She is interested in considering medication assistance as well.  Naltrexone may be a good therapeutic option for the patient once determined that her liver disease is stable. We also discussed acamprosate as a second line agent, but currently would defer due to renal function.    -Will continue to follow up with patient during hospitalization and consider timing for starting medication therapy (likely naltrexone) in consultation with primary team and hepatology service.  -Agree with trending LFTs and synthetic function to help determine stability in liver disease.  - she would also benefit from linkage to outpatient addiction medicine, see \"linkage to care,\" below     # Mental health: History of PTSD and Anxiety  -Agree with health psychology consultation  -Agree with use of selective serotonin reuptake inhibitor and prn hydroxine     # Harm Reduction:  -hepatitis " "C serologies non-reactive 7/27/23  -HIV nonreactive 7/29/23; she does not endorse high risk behaviors on interview     # Immunization review:   -Hep A: has completed primary 2-dose series in 2012 per [facility]  -Hep B: has completed vaccine series per [facility]     # Peer Support:   -Our peer  will meet the patient if agreeable and still hospitalized on Thursday, to provide additional outpatient resources  -To contact Yolette Peer  from [facility] ([facility]): call or text: [phone]     # Addiction Social Worker:   Our addiction social worker Anirudh Coyne can be contacted if needed, on her pager [phone] or texted/called at [phone]     # Linkage to Care:   -Connected with  hepatology and undergoing transplant workup. PCP recently established at [facility], last seen in 8/2023.  -Currently connected with outpatient program at [facility] in [place].  Will discuss linkage to outpatient addiction medicine for long term assistance meeting goals       Rolo De Dios MD on 12/12/2023 at 9:02 AM   Addiction Service   [facility]     Contact information available via [facility] Paging/Directory under \"addiction medicine\"        ______________________________________________________________________    Interval History No acute events.  Some abdominal distension, but otherwise slowly feeling better    ROS:  CV, Pulm, GI and  assessed. Pertinent positives as above, otherwise negative.     Data reviewed today: I reviewed all medications, new labs and imaging results over the last 24 hours.     Physical Exam   Temp: 97.8  F (36.6  C) Temp src: Oral BP: 112/57 Pulse: 104   Resp: 16 SpO2: 98 % O2 Device: None (Room air)    Gen: NAD  HEENT: EOMI, PERRL, MMM  CV: extremities warm and well perfused  Resp: breathing comfortably on RA  : deferred  Msk: no LE edema  Skin: no rashes  Neuro: nonfocal exam      Due to regulation of Title 42 of the Code of " Federal Regulations (CFR) Part 2: Confidentiality laws apply to this note and the information wherein.  Thus, this note cannot be copy and pasted into any other health care staff's note nor can it be included in general medical records sent to ANY outside agency without the patient's written consent.

## 2023-12-12 NOTE — PROGRESS NOTES
Care Management Follow Up    Length of Stay (days): 2    Expected Discharge Date: 12/18/2023     Concerns to be Addressed: financial/insurance, no discharge needs identified     Patient plan of care discussed at interdisciplinary rounds: Yes    Anticipated Discharge Disposition: Home     Anticipated Discharge Services: home infusion if pt can self pay IVAB/supplies  Anticipated Discharge DME: None    Patient/family educated on Medicare website which has current facility and service quality ratings: no  Education Provided on the Discharge Plan: Yes  Patient/Family in Agreement with the Plan: yes    Referrals Placed by CM/SW:  home infusion, see below  Private pay costs discussed: Not applicable    Additional Information:    Pt potentially go home with IV cefazolin 2g q12 till 12/25. Pt doesn't have insurance to cover for home IVAB. Writer sent out referrals to 3 agencies to get a self-pay quotes. All quotes coming back and pt agreed to go with [facility] which is the most affordable price. Provider updated. Pt will likely to get the PICC line tomorrow.    [facility] - $24.17/day  Sisi [email or web address]  P: [phone]    [facility] - $167.01/day    [facility] - $31.76/day      Continue to monitor.    Maren Horton, RN  7C RN Care Coordinator  Phone: [phone]  Pager: [phone]  [place] ([phone]) Saturday & Sunday; ([phone]) [facility] Recognized Holidays   Units: 5A, 5B & 5C  Pager: [phone]  Units: 6B, 6C & 6D    Pager: [phone]  Units: 7A, 7B, 7C & 7D    Pager: [phone]  Units: 6A & ICU   Pager: [phone]  Units: 5 Ortho, 5MS & WB ED Pager: [phone]  Units: 6MS, 8A & 10 ICU  Pager [phone]

## 2023-12-12 NOTE — COMMITTEE REVIEW
Abdominal Committee Review Note     Evaluation Date:   Committee Review Date: 12/12/2023    Organ being evaluated for: Liver    Transplant Phase: Referral  Transplant Status: Active    Transplant Coordinator: Ron Hirsch Jr.  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  Secondary Diagnosis:     Committee Review Members:  Nutrition Rosie King, RD   Pharmacist Armando Barreto, [facility]    - Clinical Liza Smith, MSW, Stella Simpson, Health system, Sandeep Krishnan, [facility]   Transplant Angeles Mayer LPN, Kiana Casanova, RN, Jr Ron Hirsch, RN, Ya Mix, APRN CNP, Damaris Aponte, RN, KIM SPRINGER, RN, Oren Alvarez, JANICE, Carlos Alberto Pepe, RN   Transplant Hepatology  Betsy Robertson MD, Armando Morris MD, Marcia Davis MD, Luci Mendez MD, Josemanuel Young MD, Thomas M. Leventhal, MD, Kaylene Johnson MD   Transplant Surgery Dharmesh Norton MD, Jennifer Warner NP, Mike Suárez MD, Moraima Diaz NP, Kirk Harrell MD       Transplant Eligibility: Cirrhosis with MELD, ETOH    Committee Review Decision: Needs Re-presentation    Relative Contraindications: Other, pending evaluation    Absolute Contraindications: None    Committee Chair Luci Mendez MD verbally attested to the committee's decision.    Committee Discussion Details:     Urgent evaluation pending initiation of insurance policy 1/1/2024

## 2023-12-12 NOTE — PLAN OF CARE
Goal Outcome Evaluation:       Pt is alert and oriented x 4. VS Q 8 HR. Pt is on droplet and contact precaution. Pt is independent when ambulating. regular diet. Takes PO meds fine. Pt states that she was having some abdomen pain. Administered oxycodone.  Some relief. Administered scheduled antibiotic. NO BM this shift. Denies nausea. Continue POC.

## 2023-12-12 NOTE — CONSULTS
"    Health Psychology Inpatient Consult Psychodiagnostic Assessment     Health Psychology Office   Health Psychology Clinic   Department of Medicine   [address] [address] [facility]  3rd Floor  [address] [place] [address]     Health Psychology Team:  Maira Guerrero, Ph.D., L.P [phone]  Cass Diallo, Ph.D., L.P. [phone]  Eduardo Aguiar, Ph.D. [phone]  Rufina Rhoades, Ph.D., A.B.P.P., L.P. [phone]  Juan Miguel Sanchez, Ph.D., A.B.P.P., L.P. [phone]         Tanja Velasquez, Ph.D., L.P. [phone]   Lizbeth Sexton, Ph.D., A.B.P.P., L.P. [phone]  Fausto Lopez, Ph.D. [phone]    Health Psychology Consult placed on 12/11/23 by Maira Gillis for anxiety surrounding cirrhosis and liver transplant eval    Attempted patient contact today 12/12/23 x3. Writer was able to briefly speak with pt at 3rd attempt, though she was on the phone discussing financial assistance. Patient requested writer return another time. Writer told pt he would attempt contact another time.     Pt chart reviewed by Writer.     Health Psychology Consult Teams will re-attempt pt contact in coming days.     Per chart:  Ms. Castano is a 28 year old y/o female currently inpatient for 2 days, starting on 12/10/2023 for Cirrhosis of liver (H) [K74.60]. Pt presents with \"a history of alcohol use disorder (in remission) and cirrhosis (presumed EtOH related) who was transferred to [facility] on 12/10 from [facility] [place] [facility] for decompensated liver disease and acute kidney injury. She was transferred primarily for specialty evaluation and ongoing workup related to transplant.\" Pt chart indicates an additional mhx including PTSD and anxiety.     Fausto Lopez, PhD  Clinical Health Psychology Fellow  Phone: [phone]    "

## 2023-12-12 NOTE — PROGRESS NOTES
Brief Progress note    Exam chaperoned by RN    Patient seen for pap smear. Notes that she has never had one previously. Notes that she believes she received the HPV vaccine. States that she is a never smoker. Denies history of STI/STD. On exam, normal external female genitalia. Speculum exam limited by positioning in hospital bed. No visible vaginal lesions or atypical vasculature. Cervix without lesion or atypical vasculature though visualization limited. Pap smear collected and sent to pathology. All questions answered at this time. Please reach out with ongoing questions or concerns. The gynecology team will sign off at this time.     Harjinder Lovett MD  OBGYN PGY-4  December 12, 2023 5:38 PM

## 2023-12-12 NOTE — PROGRESS NOTES
Nephrology Progress Note  12/12/2023       Mrs Castano is 28-year-old female with past medical history significant for end-stage renal disease secondary to alcohol abuse complicated by recurrent large-volume ascites requiring paracenteses every 1 to 2 weeks who was admitted to [facility] on December 5, 2023 for evaluation of abdominal pain and distention.  Nephrology service is following for worsening of renal function.     Interval History :   Mrs Castano's Cr is slightly improved today, FELIX likely from combination of aggressive fluid removal with para with NSAID's given as well.  Likely in early recovery, urine microscopy shows some scattered gran casts but mainly transitional cells (likely from bladder).  Hopeful she is in early recovery, will continue to monitor and will plan to follow up in CKD clinic while she continues workup for liver transplant.      Assessment & Recommendations:   FELIX-Baseline Cr as low as 0.4 in 7/2023 but more recently has been ~0.8, had hyaline casts on UA consistent with prerenal or HRS.  Did receive toradol on 12/5 which has been stopped.  UPCR 0.2g/g, received 1g/g albumin x48h and Cr has plateaued at ~2.5.  Continuing supportive cares, there is some suspicion for infection with 1/2 cultures +.  Looking to do urine microscopy to see if there is evidence of ATN or if we should consider terlipressin for HRS.  BP's are higher than one would expect for HRS but did respond to midodrine relatively well.     -FELIX, hemodynamic with hyaline casts.  Likely hypovolemia with concurrent NSAID exposure on 12/5.   -Continuing midodrine, with Cr stable/improving the past 3 days we are continuing supportive cares   -Hepatology working up for liver transplant.     Urine Microscopy      Volume status-Minimal peripheral edema, + abd distension with ascites.  Breathing comfortably on RA even lying flat.      Electrolytes/pH-K 3.7, bicarb 20, Na 131    Hepatology-Has noticed ascites for the  past ~2 months, Meld 3.0 ~35, getting workup for liver tx, sober since 7/2023 and there is some ? Of other etiology than ETOH.      Ca/phos/pth-Ca 8.1, Mg 2.0, Phos 2.6.    Anemia-Hgb 7.5, checked iron stores which were above detectable level.      Nutrition-Taking PO but supect poor intake with BUN of 9 at time of consult.      Time spent: 45 minutes on this date of encounter for chart review, physical exam, medical decision making and co-ordination of care.     Seen and discussed with Dr Downs    Recommendations were communicated to primary team via verbal communication.     LAZ Khanna CNS  Clinical Nurse Specialist  [phone]      Review of Systems:   I reviewed the following systems:  Gen: No fevers or chills  CV: No CP at rest  Resp: No SOB at rest  GI: No N/V    Physical Exam:   I/O last 3 completed shifts:  In: -   Out: 400 [Urine:400]   /57 (BP Location: Right arm)   Pulse 104   Temp 97.8  F (36.6  C) (Oral)   Resp 16   Wt 80.3 kg (177 lb 0.5 oz)   SpO2 98%   BMI 31.86 kg/m       GENERAL APPEARANCE: + abd distension but in no distress  EYES: + scleral icterus, pupils equal  HENT: mouth without ulcers or lesions  PULM: lungs clear to auscultation, equal air movement, no cyanosis or clubbing  CV: regular rhythm, normal rate     -edema trace LE  GI: soft, non-tender, moderately distended  MS: no evidence of inflammation in joints, no muscle tenderness  NEURO: mentation intact and speech normal    Labs:   All labs reviewed by me  Electrolytes/Renal -   Recent Labs   Lab Test 12/12/23  0640 12/11/23  0634 12/10/23  0742 12/09/23  0719 12/08/23  1354 12/08/23  0719 12/06/23  1424 12/06/23  0709 08/08/23  1140 07/30/23  0639   * 131* 132* 131*  --  129*   < > 132*   < > 139   POTASSIUM 4.2 3.7 3.9 3.8   < > 3.4   < > 3.0*   < > 4.1   CHLORIDE 102 100 100 100  --  99   < > 101   < > 109*   CO2 20* 20* 22 22  --  21*   < > 24   < > 25   BUN 11.8 9.1 8.6 8.3  --  8.3   < > 5.4*   < >  5.6*   CR 2.37* 2.50* 2.56* 2.46*  --  2.30*   < > 1.11*   < > 0.58   GLC 94 92 100* 87  --  95   < > 80   < > 101*   JULIAN 8.1* 8.5* 8.4* 8.1*  --  7.3*   < > 7.3*   < > 7.6*   MAG  --   --  2.0 2.0  --  2.2   < > 1.1*  --  1.8   PHOS  --  2.6  --   --   --   --   --  3.1  --  3.9    < > = values in this interval not displayed.       CBC -   Recent Labs   Lab Test 12/12/23  0915 12/12/23  0640 12/11/23  0634   WBC 14.0* 12.6* 12.9*   HGB 7.5* 6.6* 7.5*    140* 129*       LFTs -   Recent Labs   Lab Test 12/12/23  0640 12/11/23  0634 12/10/23  0742 12/07/23  0630   ALKPHOS 90 101  --  192*   BILITOTAL 3.7* 4.3*  --  2.7*   ALT 8 9  --  24   AST 54* 63*  --  97*   PROTTOTAL 5.7* 6.6  --  5.8*   ALBUMIN 3.0* 3.7 3.6 1.8*       Iron Panel -   Recent Labs   Lab Test 12/11/23  0634 12/10/23  2118   IRON 61  --    ADDY  --  70           Current Medications:   ceFAZolin  2 g Intravenous Q8H    escitalopram  5 mg Oral Daily    Followed by    [START ON 12/25/2023] escitalopram  10 mg Oral Daily    sodium chloride (PF)  3 mL Intracatheter Q8H

## 2023-12-12 NOTE — PROGRESS NOTES
Since pt does not have insurance, they will be self-pay for the iv abx. Based on Cefazolin 2g q8h, total cost is $167.01 per day for drug and supplies. Nursing cost will be $90.00 a visit if [facility] bills for it.     ([facility]) In reference to admission date 12/10/2023 to check for iv abx coverage.     Please contact Intake with any questions, [phone] or In Basket pool,  Home Infusion ([phone]).    No

## 2023-12-12 NOTE — PLAN OF CARE
Nursing Care Plan Note:    Assumed care 1900 to 2330    /50 (BP Location: Right arm)   Pulse 107   Temp 98.4  F (36.9  C) (Oral)   Resp 18   Wt 77.6 kg (171 lb 1.2 oz)   SpO2 95%   BMI 30.79 kg/m      Active/Admitting Problems:  cirrhosis   Pt rounded on hourly  [place]:  alert and oriented   Pain:  pain in ABD PRN oxy given   GI/:  Denies nausea. Bowel sounds present.   How Pt Takes Meds:  oral  Cardiac:  WDL  Respiratory:  denies SOB lung sounds wheezing bilaterally Pt has a minimal productive cough. PRN robitussin given   Skin:  clean dry and intact scattered bruising   Lines:  PIV saline locked  Activity/mobility:  I  Plan:  transplant work up    Call light in reach, Pt able to make needs known, Will continue to monitor and follow plan of care.     Goal Outcome Evaluation:     Plan of Care Reviewed With: Pt     Overall Patient Progress: Progressing      Outcome Evaluation: transplant work up

## 2023-12-12 NOTE — CONSULTS
Care Management Initial Consult    General Information  Assessment completed with: Patient, VM-chart review,    Type of CM/SW Visit: Initial Assessment    Primary Care Provider verified and updated as needed: Yes (Dr. Hannah Villalba with [facility])   Readmission within the last 30 days: previous discharge plan unsuccessful (from [facility])   Return Category: Progression of disease  Reason for Consult: insurance concerns  Advance Care Planning: Advance Care Planning Reviewed: no concerns identified        Communication Assessment  Patient's communication style: spoken language (English or Bilingual)    Hearing Difficulty or Deaf: no   Wear Glasses or Blind: no    Cognitive  Cognitive/Neuro/Behavioral: WDL                      Living Environment:   People in home: parent(s)  Aunt Pema and Uncle Marquise  Current living Arrangements: house      Able to return to prior arrangements: yes  Living Arrangement Comments: None    Family/Social Support:  Care provided by: self  Provides care for: no one  Marital Status: Single   (Aunt Pema and Uncle Marquise)          Description of Support System: Supportive, Involved    Support Assessment: Adequate family and caregiver support, Adequate social supports    Current Resources:   Patient receiving home care services: No     Community Resources: None  Equipment currently used at home: none  Supplies currently used at home: None    Employment/Financial:  Employment Status: employed full-time        Financial Concerns: insurance inadequate   Referral to Financial Worker: Yes     Does the patient's insurance plan have a 3 day qualifying hospital stay waiver?  No    Lifestyle & Psychosocial Needs:  Social Determinants of Health     Food Insecurity: Not on file   Depression: Not at risk (8/8/2023)    PHQ-2     PHQ-2 Score: 0   Housing Stability: Not on file   Tobacco Use: Low Risk  (12/6/2023)    Patient History     Smoking Tobacco Use: Never     Smokeless Tobacco Use: Never     Passive  Exposure: Not on file   Financial Resource Strain: Not on file   Alcohol Use: Not on file   Transportation Needs: Not on file   Physical Activity: Not on file   Interpersonal Safety: Not on file   Stress: Not on file   Social Connections: Not on file     Functional Status:  Prior to admission patient needed assistance:   Dependent ADLs:: Independent  Dependent IADLs:: Independent  Assesssment of Functional Status: At functional baseline    Mental Health Status:  Mental Health Status: No Current Concerns       Chemical Dependency Status:  Chemical Dependency Status: No Current Concerns           Values/Beliefs:  Spiritual, Cultural Beliefs, Congregational Practices, Values that affect care: no          Additional Information:    Background from H&P: Priya Castano is a 28 year old female w/ cirrhosis (currently attributed to alcohol use) admitted on 12/10/2023. She is a transfer from [facility] for further workup for worsening kidney function ISO liver cirrhosis and planning for transplant workup. GI and nephrology consulted on admission, appreciate assistance.     Patient's status reviewed by chart. A CMA is needed d/t insurance concerns and re hospitalization. Writer spoke with the pt, Priya via phone d/t the pt has contact, droplet isolation to complete care management assessment. Writer introduced self, the role in care, and the nature of the care management assessment. The patient's address and PCP are correct.    Priya lives with her Aunt Pema and Uncle Marquise at home. She is independence with ADLs and IADLs. She works for [employer] company full time 40hrs/wk. She drives herself to work. The current main concern is she doesn't have health insurance and she is not qualified for MA d/t her current income. Priya stated that she will enroll her insurance through her work end of this month.    Provider updated that she has cefazolin 2g q12 till 12/25. Writer will send referral to [facility] to get a self  pay quote for the cefazolin and check with the pt if she can cover the cost.    Continue to monitor.     [facility] for IVAB  Main: [phone]  Intake: [phone]    Maren Horton, RN  7C RN Care Coordinator  Phone: [phone]  Pager: [phone]  [facility] ([phone]) Saturday & Sunday; ([phone]) [facility] Recognized Holidays   Units: 5A, 5B & 5C  Pager: [phone]  Units: 6B, 6C & 6D    Pager: [phone]  Units: 7A, 7B, 7C & 7D    Pager: [phone]  Units: 6A & ICU   Pager: [phone]  Units: 5 Ortho, 5MS & WB ED Pager: [phone]  Units: 6MS, 8A & 10 ICU  Pager [phone]    .

## 2023-12-12 NOTE — PROGRESS NOTES
[facility]    Medicine Progress Note - Hospitalist Service, GOLD TEAM 8    Date of Admission:  12/10/2023    Assessment & Plan   Priya Castano is a 28 year old female w/ cirrhosis (currently attributed to alcohol use) admitted on 12/10/2023. She is a transfer from [facility] for further workup for worsening kidney function ISO liver cirrhosis and planning for transplant workup. GI and nephrology consulted on admission, appreciate assistance.     Decompensated Cirrhosis w/ ascites   [facility] Dr. Mendez. Currently undergoing liver transplant workup. Received paracentesis on 12/5, 11/27, 11/10. Outpatient, removing 5-6L,  followed by albumin and previously on spironolactone and furosemide PTA (stopped 12/6 2/2 concern for prerenal FELIX as below). No evidence of SBP on ascites fluid analysis, fluid cx NGTD (para 12/5). Treated w/ IV ceftriaxone (12/5- 12/8) and IV vancomycin (12/5- 12/6). Etiology of cirrhosis felt likely alcohol at this point. Has not had alcohol since 7/2723.   - Hepatology consult placed, appreciate recs  - Daily MELD labs  - Daily CMP  - Additional cirrhosis workup negative so far except low ceruloplasmin   - 24 hour urine copper collection starting 12/12  - Continue midodrine 2.5mg TID for now, consider discontinuation if HRS less likely    FELIX likely secondary to ATN  Seen by nephrology at [facility]-- suspected FELIX likely 2/2 high volume paracenteses and over-diuresis. However pt now s/p albumin challenge (6 50g 25% albumin doses since 12/7 without improvement), PTA diuretics held, yet Cr continues to worsen. Urine sodium 20, not consistent w/ HRS. Now seems most likely ATN due to NSAID use plus paracentesis as Kidney function starting to improve.  - Nephrology consult placed, appreciate recs  - HOLD all nephrotoxic medications  - Daily CMP to trend renal function and electrolytes  - Strict I&Os to monitor urine  output  - Renal US  - Repeat UA w/ microscopic  - Urine studies: protein, sodium     MSSA positive blood culture 12/5   Noted at [facility] and felt to be contaminant as blood culture was drawn from old peripheral IV.  Repeat blood cultures have been negative and she has no systemic symptoms of infection.  ID was consulted who felt given the risk of infection with underlying cirrhosis will treat for uncomplicated MSSA bacteremia.  -2 weeks of IV cefazolin 2 g every 8 hours  -Plan for PICC placement tomorrow possible home IV infusion    Hyponatremia  Low Na in setting of liver disease possibly made worse by hypovolemia  - CMP daily     Anemia, chronic w/ acute drop, improved  Thrombocytopenia  Folate deficiency   On admission to [facility] 12/4, hgb 5.5, received 3U RBCs, hgb up to nearly 8, now hovering just above 7. Normocytic. Plt 105 on admission.  Workup notable for low folate, normal B12, and normal ferritin albeit in the setting of cirrhosis.  - Daily CBC to monitor hgb for now  - Transfuse < 7  -Start folic acid 1 mg daily  -Peripheral smear and reticulocyte count ordered     RSV positive 12/5  Admitted to [facility] 12/4/23 w/ c/f sepsis, RSV positive, also tx for presumed SBP (now ruled out). Presented w/ tachycardia (123), leukocytosis (16.2). Treated w/ IV ceftriaxone (12/5- 12/8) and IV vancomycin (12/5- 12/6).  Supportive cares for RSV. No documentation of need for supplemental O2.    - Supportive cares for RSV-- IS, guaifenesin prn  - Continue to monitor WBC     Systolic murmur  Appreciated on exam, Grade III.   - Echocardiogram     Acquired hypothyroidism due to Hashimoto's thyroiditis  Elevated TSH, normal free T4 12.5.  - Noted, no PTA meds at this time    PTSD  Anxiety  Has anxiety related to her liver disease. Was given ativan at [place]. Discussed anxiety with patient who is agreeable to starting selective serotonin reuptake inhibitor and hydroxyzine while stopping ativan.   - start lexapro 5  "mg x 2 weeks then 10 mg daily  - Hydroxyzine 25-50 mg Q6H PRN  - Health psych consult           Diet: Regular Diet Adult    DVT Prophylaxis: Pneumatic Compression Devices  Short Catheter: Not present  Lines: None     Cardiac Monitoring: None  Code Status: Full Code      Clinically Significant Risk Factors              # Hypoalbuminemia: Lowest albumin = 3 g/dL at 12/12/2023  6:40 AM, will monitor as appropriate    # Coagulation Defect: INR = 2.29 (Ref range: 0.85 - 1.15) and/or PTT = 50 Seconds (Ref range: 22 - 38 Seconds), will monitor for bleeding  # Thrombocytopenia: Lowest platelets = 129 in last 2 days, will monitor for bleeding  # Acute Kidney Injury, unspecified: based on a >150% or 0.3 mg/dL increase in last creatinine compared to past 90 day average, will monitor renal function         # Obesity: Estimated body mass index is 31.86 kg/m  as calculated from the following:    Height as of 12/4/23: 1.588 m (5' 2.5\").    Weight as of this encounter: 80.3 kg (177 lb 0.5 oz)., PRESENT ON ADMISSION     # Financial/Environmental Concerns: insurance inadequate         Disposition Plan      Expected Discharge Date: 12/18/2023      Destination: home;home with family  Discharge Comments: Working on liver transplant lisette, needs insurance before she starts, also needs IV abx until 12/25 so may need home infusion            Leonid Elliott, DO  Hospitalist Service, GOLD TEAM 8  [facility]  Securely message with [employer] (more info)  Text page via [facility] Paging/Directory   See signed in provider for up to date coverage information  ______________________________________________________________________    Interval History     No acute events overnight.  She is overall feeling pretty good today except for more tired than yesterday.  She is going to try and stay up to visit with her relative so that she has a better chance of sleeping tonight.  Her abdomen is more distended today but not " very painful so we will wait on paracentesis.  Kidney function starting to improve a little bit.    Physical Exam   Vital Signs: Temp: 98  F (36.7  C) Temp src: Oral BP: 122/63 Pulse: 107   Resp: 16 SpO2: 98 % O2 Device: None (Room air)    Weight: 177 lbs .47 oz    General Appearance: In bed, no distress   Respiratory: breathing comfortably on room air   Cardiovascular: RRR  GI: distended non tender   Skin: no rashes or lesions   Other: Non focal neuro exam      Medical Decision Making       55 MINUTES SPENT BY ME on the date of service doing chart review, history, exam, documentation & further activities per the note.      Data     I have personally reviewed the following data over the past 24 hrs:    14.0 (H)  \   7.5 (L)   / 165     131 (L) 102 11.8 /  94   4.2 20 (L) 2.37 (H) \     ALT: 8 AST: 54 (H) AP: 90 TBILI: 3.7 (H)   ALB: 3.0 (L) TOT PROTEIN: 5.7 (L) LIPASE: N/A     INR:  2.29 (H) PTT:  N/A   D-dimer:  N/A Fibrinogen:  N/A     Ferritin:  N/A % Retic:  3.3 (H) LDH:  213       Imaging results reviewed over the past 24 hrs:   No results found for this or any previous visit (from the past 24 hour(s)).

## 2023-12-12 NOTE — PROGRESS NOTES
[facility]    Hepatology Follow-up    CC: FELIX concern for HRS versus ATN    Dx: Decompensated cirrhosis w/ refractory ascites due to alcohol use    24 hour events:   Seen by ID, nephrology and psychiatry. Started on IV cefazolin for 2 weeks and started selective serotonin reuptake inhibitor. NAOE.     Subjective:  Feeling well this morning, but does continue to feel tired. Reports improvement of symptoms including improved cough and sore throat. Denies fevers, sweats or chills. Is hoping to finish healing at home as soon as she is able to safely discharged.    Medications  Current Facility-Administered Medications   Medication Dose Route Frequency    ceFAZolin  2 g Intravenous Q12H    escitalopram  5 mg Oral Daily    Followed by    [START ON 12/25/2023] escitalopram  10 mg Oral Daily    sodium chloride (PF)  3 mL Intracatheter Q8H     Review of systems  A 10-point review of systems was negative.    Examination  /57 (BP Location: Right arm)   Pulse 104   Temp 97.8  F (36.6  C) (Oral)   Resp 16   Wt 80.3 kg (177 lb 0.5 oz)   SpO2 98%   BMI 31.86 kg/m      Intake/Output Summary (Last 24 hours) at 12/12/2023 0856  Last data filed at 12/12/2023 0838  Gross per 24 hour   Intake 243 ml   Output 400 ml   Net -157 ml       Gen- well, NAD, A+Ox3, normal color  CVS- RRR  RS- CTA  Abd- soft, distended, no rebound or guarding  Extr- trace LE edema  Neuro- no asterixis   Skin- no rash on exposed skin  Psych- normal mood    Laboratory  Lab Results   Component Value Date     12/12/2023    POTASSIUM 4.2 12/12/2023    CHLORIDE 102 12/12/2023    CO2 20 12/12/2023    BUN 11.8 12/12/2023    CR 2.37 12/12/2023       Lab Results   Component Value Date    BILITOTAL 3.7 12/12/2023    ALT 8 12/12/2023    AST 54 12/12/2023    ALKPHOS 90 12/12/2023       Lab Results   Component Value Date    WBC 12.6 12/12/2023    HGB 6.6 12/12/2023    HGB 13.9 12/09/2013    MCV 92 12/12/2023     12/12/2023        Lab Results   Component Value Date    INR 2.29 12/12/2023     MELD 3.0: 34 at 12/12/2023  6:40 AM  MELD-Na: 31 at 12/12/2023  6:40 AM  Calculated from:  Serum Creatinine: 2.37 mg/dL at 12/12/2023  6:40 AM  Serum Sodium: 131 mmol/L at 12/12/2023  6:40 AM  Total Bilirubin: 3.7 mg/dL at 12/12/2023  6:40 AM  Serum Albumin: 3.0 g/dL at 12/12/2023  6:40 AM  INR(ratio): 2.29 at 12/12/2023  6:40 AM  Age at listing (hypothetical): 28 years  Sex: Female at 12/12/2023  6:40 AM    Radiology  ECHO (12/10/2023)  Interpretation Summary  No significant valvular abnormalities present. Likely flow murmur related to  high cardiac output (estimated 10 L/min).  Global and regional left ventricular function is normal with an EF of 60-65%.  Global right ventricular function is normal.  No pericardial effusion is present.  There is no prior study for direct comparison.    US ABDOMEN COMPLETE (12/10/2023)  Impression:   1.  Liver cirrhosis with findings of portal hypertension including  recanalized paraumbilical vein and retrograde flow within the right  portal vein, moderate volume ascites and mild splenomegaly.  2.  Cholelithiasis and biliary sludge without evidence of acute  cholecystitis.     Assessment  Ms. Castano is a 28 y.o. woman with PMHx of AUD, ARLD who was admitted for decompensated cirrhosis with evidence of recurrent ascites requiring frequent paracentesis (two during this hospital admission as well as scheduled outpatient paracentesis prior to admission). Course has been further complicated by acute on chronic anemia requiring frequent blood transfusions, x 3, positive blood cultures for staphylococcus aureus (1/2 bottles on 12/5), and FELIX s/p IV albumin x 3. With current status, she was transferred to the [facility] from [facility] on 12/10 for possible expedited liver transplant evaluation (although unable to complete at this time due to insurance concerns) and FELIX suspected to be most likely ATN over HRS.     #  Decompensated cirrhosis w/ refractory ascities due to alcohol use  # Hyperbilirubinemia  # Elevated INR  # Mild hyponatremia  Etiology: ARLD  MELD-Na of 31 (12/12/2023)  Hepatic encephalopathy: Minimal HE  Ascites: Noted on examination/ultrasound   TIPS: No  Esophageal/Gastric varices: No prior EGD  Hepatocellular carcinoma: Last USS was 12/11/2023 w/ AFP 2.3 (11/2023)  Transplant: Not listed, evaluation pending   Nutrition: Weight is 181 lbs (12/2023)  Coagulopathy: INR 2.42  Thrombocytopenia: Related to cirrhosis  Paracentesis: 12/5, negative for SBP, Fluid Cx: NGTD     #FELIX, suspected ATN, improving  On admission Cr 0.67 which is within baseline. Over the course of hospitalization she required multiple large volume paracentesis and continued on diuretics. Cr elevated to 2.56. Treated with IV albumin x 3 days (completed 12/10) and spironolactone and lasix discontinued. Trending down, making current HRS less likely.     # Acute on chronic normocytic anemia requiring transfusions  Hemoglobin 5.5 on admission. Received a total of 3 units of pRBC since admission with improvement.     RECOMMENDATIONS:  -- Hold furosemide and spironolactone at discharge   -- Plan for scheduled paracentesis outpatient  -- Ordered 24 hour urine copper collection (starting 12/12)  -- Consult GYN for pre transplant cervical cancer screening  -- Monitor transaminases, bilirubin, INR, and Cr  -- Hold on further albumin at this time (received albumin x 3 days)  -- Avoid nephrotoxic medications  -- Monitor neurologic status  -- Hold lactulose PO (if becoming more confused can restart lactulose)  -- Ensure sodium restriction to 2000 mg per day  -- Adequate protein diet (1.2-1.5g/kg per day) w/ supplements in between meals  -- Follow up alpha 1 antitrypsin, hemachromatosis, and peth   ---- Completed testing: acute hepatitis panel negative (7/2023), ceruloplasmin (low, 12/2023)  -- Consult ID, continue antibiotics per ID recommendation     Care  plan formally discussed and patient seen with Dr. Mendez.     Vanessa Webster MD  Internal Medicine PGY 2

## 2023-12-12 NOTE — PLAN OF CARE
Goal Outcome Evaluation:      Plan of Care Reviewed With: patient    Overall Patient Progress: no changeOverall Patient Progress: no change    Outcome Evaluation: Hgb trending down, on IVAB till 2/25    Maren Horton RNCC

## 2023-12-12 NOTE — PROVIDER NOTIFICATION
"Provider notified (name): Arely Brian MD  Reason for notification: Critical Hgb 6.6  Recommendation/request given to provider: \"7C. [id number]. PREETHI. JANICE Ceballos. Critical Hgb reported from lab at 0719: Hgb 6.6. Recent Hgb yesterday, 7.5. Please inform next steps.\"  Response from provider:  Plan to repeat Hgb lab; ordered by provider after phone update ([phone])    "

## 2023-12-13 DIAGNOSIS — D64.9 ANEMIA, UNSPECIFIED TYPE: ICD-10-CM

## 2023-12-13 DIAGNOSIS — K70.11 ALCOHOLIC HEPATITIS WITH ASCITES (H): Primary | ICD-10-CM

## 2023-12-13 LAB
ALBUMIN SERPL BCG-MCNC: 3 G/DL (ref 3.5–5.2)
ALP SERPL-CCNC: 91 U/L (ref 40–150)
ALT SERPL W P-5'-P-CCNC: <5 U/L (ref 0–50)
ANION GAP SERPL CALCULATED.3IONS-SCNC: 9 MMOL/L (ref 7–15)
AST SERPL W P-5'-P-CCNC: 47 U/L (ref 0–45)
BILIRUB SERPL-MCNC: 3.3 MG/DL
BLD PROD TYP BPU: NORMAL
BLD PROD TYP BPU: NORMAL
BLOOD COMPONENT TYPE: NORMAL
BLOOD COMPONENT TYPE: NORMAL
BUN SERPL-MCNC: 14.4 MG/DL (ref 6–20)
CALCIUM SERPL-MCNC: 8 MG/DL (ref 8.6–10)
CHLORIDE SERPL-SCNC: 101 MMOL/L (ref 98–107)
CODING SYSTEM: NORMAL
CODING SYSTEM: NORMAL
CREAT SERPL-MCNC: 2.14 MG/DL (ref 0.51–0.95)
CROSSMATCH: NORMAL
DEPRECATED HCO3 PLAS-SCNC: 19 MMOL/L (ref 22–29)
EGFRCR SERPLBLD CKD-EPI 2021: 31 ML/MIN/1.73M2
ERYTHROCYTE [DISTWIDTH] IN BLOOD BY AUTOMATED COUNT: 23.2 % (ref 10–15)
FIBRINOGEN PPP-MCNC: 87 MG/DL (ref 170–490)
GLUCOSE SERPL-MCNC: 84 MG/DL (ref 70–99)
HAPTOGLOB SERPL-MCNC: 22 MG/DL (ref 30–200)
HCT VFR BLD AUTO: 21.6 % (ref 35–47)
HGB BLD-MCNC: 6.9 G/DL (ref 11.7–15.7)
INR PPP: 2.54 (ref 0.85–1.15)
ISSUE DATE AND TIME: NORMAL
ISSUE DATE AND TIME: NORMAL
MCH RBC QN AUTO: 28.8 PG (ref 26.5–33)
MCHC RBC AUTO-ENTMCNC: 31.9 G/DL (ref 31.5–36.5)
MCV RBC AUTO: 90 FL (ref 78–100)
PATH REPORT.COMMENTS IMP SPEC: NORMAL
PATH REPORT.COMMENTS IMP SPEC: NORMAL
PATH REPORT.FINAL DX SPEC: NORMAL
PATH REPORT.MICROSCOPIC SPEC OTHER STN: NORMAL
PATH REPORT.MICROSCOPIC SPEC OTHER STN: NORMAL
PATH REPORT.RELEVANT HX SPEC: NORMAL
PLATELET # BLD AUTO: 144 10E3/UL (ref 150–450)
POTASSIUM SERPL-SCNC: 4.1 MMOL/L (ref 3.4–5.3)
PROT SERPL-MCNC: 5.7 G/DL (ref 6.4–8.3)
RBC # BLD AUTO: 2.4 10E6/UL (ref 3.8–5.2)
SODIUM SERPL-SCNC: 129 MMOL/L (ref 135–145)
UNIT ABO/RH: NORMAL
UNIT ABO/RH: NORMAL
UNIT NUMBER: NORMAL
UNIT NUMBER: NORMAL
UNIT STATUS: NORMAL
UNIT STATUS: NORMAL
UNIT TYPE ISBT: 5100
UNIT TYPE ISBT: 5100
WBC # BLD AUTO: 13.1 10E3/UL (ref 4–11)

## 2023-12-13 PROCEDURE — 85610 PROTHROMBIN TIME: CPT | Performed by: STUDENT IN AN ORGANIZED HEALTH CARE EDUCATION/TRAINING PROGRAM

## 2023-12-13 PROCEDURE — 83010 ASSAY OF HAPTOGLOBIN QUANT: CPT | Performed by: INTERNAL MEDICINE

## 2023-12-13 PROCEDURE — 99418 PROLNG IP/OBS E/M EA 15 MIN: CPT | Mod: FS | Performed by: CLINICAL NURSE SPECIALIST

## 2023-12-13 PROCEDURE — 120N000002 HC R&B MED SURG/OB UMMC

## 2023-12-13 PROCEDURE — 80053 COMPREHEN METABOLIC PANEL: CPT | Performed by: STUDENT IN AN ORGANIZED HEALTH CARE EDUCATION/TRAINING PROGRAM

## 2023-12-13 PROCEDURE — 99207 PR APP CREDIT; MD BILLING SHARED VISIT: CPT | Performed by: INTERNAL MEDICINE

## 2023-12-13 PROCEDURE — P9012 CRYOPRECIPITATE EACH UNIT: HCPCS | Performed by: STUDENT IN AN ORGANIZED HEALTH CARE EDUCATION/TRAINING PROGRAM

## 2023-12-13 PROCEDURE — 36415 COLL VENOUS BLD VENIPUNCTURE: CPT | Performed by: STUDENT IN AN ORGANIZED HEALTH CARE EDUCATION/TRAINING PROGRAM

## 2023-12-13 PROCEDURE — 82525 ASSAY OF COPPER: CPT | Performed by: STUDENT IN AN ORGANIZED HEALTH CARE EDUCATION/TRAINING PROGRAM

## 2023-12-13 PROCEDURE — 85384 FIBRINOGEN ACTIVITY: CPT | Performed by: STUDENT IN AN ORGANIZED HEALTH CARE EDUCATION/TRAINING PROGRAM

## 2023-12-13 PROCEDURE — 90791 PSYCH DIAGNOSTIC EVALUATION: CPT | Mod: HN

## 2023-12-13 PROCEDURE — 99233 SBSQ HOSP IP/OBS HIGH 50: CPT | Performed by: STUDENT IN AN ORGANIZED HEALTH CARE EDUCATION/TRAINING PROGRAM

## 2023-12-13 PROCEDURE — P9016 RBC LEUKOCYTES REDUCED: HCPCS | Performed by: STUDENT IN AN ORGANIZED HEALTH CARE EDUCATION/TRAINING PROGRAM

## 2023-12-13 PROCEDURE — 99233 SBSQ HOSP IP/OBS HIGH 50: CPT | Mod: FS | Performed by: CLINICAL NURSE SPECIALIST

## 2023-12-13 PROCEDURE — 250N000011 HC RX IP 250 OP 636: Mod: JZ | Performed by: STUDENT IN AN ORGANIZED HEALTH CARE EDUCATION/TRAINING PROGRAM

## 2023-12-13 PROCEDURE — 85027 COMPLETE CBC AUTOMATED: CPT | Performed by: STUDENT IN AN ORGANIZED HEALTH CARE EDUCATION/TRAINING PROGRAM

## 2023-12-13 PROCEDURE — 99232 SBSQ HOSP IP/OBS MODERATE 35: CPT | Mod: GC | Performed by: STUDENT IN AN ORGANIZED HEALTH CARE EDUCATION/TRAINING PROGRAM

## 2023-12-13 PROCEDURE — 250N000013 HC RX MED GY IP 250 OP 250 PS 637: Performed by: STUDENT IN AN ORGANIZED HEALTH CARE EDUCATION/TRAINING PROGRAM

## 2023-12-13 RX ORDER — HEPARIN SODIUM (PORCINE) LOCK FLUSH IV SOLN 100 UNIT/ML 100 UNIT/ML
5 SOLUTION INTRAVENOUS
Status: CANCELLED | OUTPATIENT
Start: 2023-12-13

## 2023-12-13 RX ORDER — ALBUMIN (HUMAN) 12.5 G/50ML
12.5 SOLUTION INTRAVENOUS
OUTPATIENT
Start: 2023-12-13

## 2023-12-13 RX ORDER — LIDOCAINE 40 MG/G
CREAM TOPICAL
Status: DISCONTINUED | OUTPATIENT
Start: 2023-12-13 | End: 2023-12-14

## 2023-12-13 RX ORDER — HEPARIN SODIUM,PORCINE 10 UNIT/ML
5-20 VIAL (ML) INTRAVENOUS DAILY PRN
Status: CANCELLED | OUTPATIENT
Start: 2023-12-13

## 2023-12-13 RX ORDER — ALBUMIN (HUMAN) 12.5 G/50ML
50 SOLUTION INTRAVENOUS
Status: COMPLETED | OUTPATIENT
Start: 2023-12-13 | End: 2023-12-14

## 2023-12-13 RX ADMIN — ESCITALOPRAM 5 MG: 5 TABLET, FILM COATED ORAL at 08:03

## 2023-12-13 RX ADMIN — CEFAZOLIN SODIUM 2 G: 2 INJECTION, SOLUTION INTRAVENOUS at 22:42

## 2023-12-13 RX ADMIN — CEFAZOLIN SODIUM 2 G: 2 INJECTION, SOLUTION INTRAVENOUS at 06:04

## 2023-12-13 RX ADMIN — FOLIC ACID 1 MG: 1 TABLET ORAL at 08:03

## 2023-12-13 RX ADMIN — OXYCODONE HYDROCHLORIDE 5 MG: 5 TABLET ORAL at 06:02

## 2023-12-13 RX ADMIN — OXYCODONE HYDROCHLORIDE 5 MG: 5 TABLET ORAL at 16:25

## 2023-12-13 RX ADMIN — CEFAZOLIN SODIUM 2 G: 2 INJECTION, SOLUTION INTRAVENOUS at 13:53

## 2023-12-13 ASSESSMENT — ACTIVITIES OF DAILY LIVING (ADL)
ADLS_ACUITY_SCORE: 20

## 2023-12-13 NOTE — PROGRESS NOTES
Transplant Social Work Services Progress Note      Date of Initial Social Work Evaluation: 12/12/2023  Collaborated with: Dr. Mendez and Jae Hirsch, pre-liver transplant coordinator    Data: Priya is being evaluated for a liver transplant.  She was found to have a positive PEth test (405) on 12/11/2023.  Intervention: I met with Priya to discus her positive PEth test, to assess her consumption and to provide recommendations from our team.  Assessment: Priya acknowledges having a 3-4 day relapse the weekend prior to hospitalization (December 2-3).  She reports drinking 1-2 bottles of wine each day.    Priya missed her usual Wednesday night treatment group at [facility] this week due to hospitalization.  She plans to resume attending next Wednesday.  Priya does report using AA on occasion, and I encouraged her to increase her attendance.  Priya will need to complete a new TURNER assessment and follow recommendations in order to be considered for liver transplantation in the future.  She agrees to this plan, though this will not be scheduled until after January 1st when Priya's health insurance policy becomes active.  Priya report her recent move to her parents will help support her sobriety.    Education provided by NAHID: NAHID role and availability  Plan: I will reach out to Priya after January 1st to discuss a date for TURNER assessment.       CARLINE Reyes, [facility]  Liver Transplant   Phone [phone]  Pager [phone]

## 2023-12-13 NOTE — DISCHARGE INSTRUCTIONS
[facility] - $24.17/day for IV antibiotic and supplies  Sisi Kathleen; Phone: [phone]      December 20th @ 11:15 AM, please come to [facility] to have your lab drawn.  At the same day, you will need to come to the [facility] @ 2:30 PM to have your midline dressing done. The phone number for lab is [phone]    December 26th @ 11:00 AM, you will need to return to [facility] to have your midline removed. The phone number for [facility] is [phone]

## 2023-12-13 NOTE — PROGRESS NOTES
[facility]    Hepatology Follow-up    CC: FELIX concern for HRS versus ATN    Dx: Decompensated cirrhosis w/ refractory ascites due to alcohol use    24 hour events:   Low hemoglobin this morning requiring transfusion of 1 U pRBC. Plan for paracentesis today followed by albumin.      Subjective:  Feeling tired this morning. Reports feeling tired when she has lower blood counts. Otherwise, she feels her abdomen is more distended, but no overt abdominal pain. No melena or hematochezia.    Medications  Current Facility-Administered Medications   Medication Dose Route Frequency     ceFAZolin  2 g Intravenous Q8H     escitalopram  5 mg Oral Daily    Followed by     [START ON 12/25/2023] escitalopram  10 mg Oral Daily     folic acid  1 mg Oral Daily     sodium chloride (PF)  3 mL Intracatheter Q8H     Review of systems  A 10-point review of systems was negative.    Examination  /66 (BP Location: Right arm, Patient Position: Semi-Mccoy's, Cuff Size: Adult Regular)   Pulse 101   Temp 98.3  F (36.8  C) (Oral)   Resp 16   Wt 83.5 kg (184 lb)   SpO2 100%   BMI 33.12 kg/m      Intake/Output Summary (Last 24 hours) at 12/12/2023 0856  Last data filed at 12/12/2023 0838  Gross per 24 hour   Intake 243 ml   Output 400 ml   Net -157 ml       Gen- well, NAD, A+Ox3, normal color  CVS- trace lower extremity edema  RS- breathing comfortably on room air  Abd- soft, distended, no rebound or guarding  Neuro- no asterixis   Skin- no rash on exposed skin, some bruising noted over the forearm bilaterally   Psych- normal mood    Laboratory  Lab Results   Component Value Date     12/12/2023    POTASSIUM 4.2 12/12/2023    CHLORIDE 102 12/12/2023    CO2 20 12/12/2023    BUN 11.8 12/12/2023    CR 2.37 12/12/2023       Lab Results   Component Value Date    BILITOTAL 3.7 12/12/2023    ALT 8 12/12/2023    AST 54 12/12/2023    ALKPHOS 90 12/12/2023       Lab Results   Component Value Date    WBC 12.6  12/12/2023    HGB 6.6 12/12/2023    HGB 13.9 12/09/2013    MCV 92 12/12/2023     12/12/2023       Lab Results   Component Value Date    INR 2.29 12/12/2023     MELD 3.0: 34 at 12/13/2023  5:59 AM  MELD-Na: 31 at 12/13/2023  5:59 AM  Calculated from:  Serum Creatinine: 2.14 mg/dL at 12/13/2023  5:59 AM  Serum Sodium: 129 mmol/L at 12/13/2023  5:59 AM  Total Bilirubin: 3.3 mg/dL at 12/13/2023  5:59 AM  Serum Albumin: 3.0 g/dL at 12/13/2023  5:59 AM  INR(ratio): 2.54 at 12/13/2023  5:59 AM  Age at listing (hypothetical): 28 years  Sex: Female at 12/13/2023  5:59 AM    Radiology  ECHO (12/10/2023)  Interpretation Summary  No significant valvular abnormalities present. Likely flow murmur related to  high cardiac output (estimated 10 L/min).  Global and regional left ventricular function is normal with an EF of 60-65%.  Global right ventricular function is normal.  No pericardial effusion is present.  There is no prior study for direct comparison.    US ABDOMEN COMPLETE (12/10/2023)  Impression:   1.  Liver cirrhosis with findings of portal hypertension including  recanalized paraumbilical vein and retrograde flow within the right  portal vein, moderate volume ascites and mild splenomegaly.  2.  Cholelithiasis and biliary sludge without evidence of acute  cholecystitis.     Assessment  Ms. Castano is a 28 y.o. woman with PMHx of AUD, ARLD who was admitted for decompensated cirrhosis with evidence of recurrent ascites requiring frequent paracentesis (two during this hospital admission as well as scheduled outpatient paracentesis prior to admission). Course has been further complicated by acute on chronic anemia requiring frequent blood transfusions, x 3, positive blood cultures for staphylococcus aureus (1/2 bottles on 12/5), and FELIX s/p IV albumin x 3. With current status, she was transferred to the [facility] from [facility] on 12/10 for possible expedited liver transplant evaluation (although unable to complete  at this time due to insurance concerns) and FELIX suspected to be most likely ATN over HRS. During hospital found to have PETH positive. This was discussed with Priya and explained that she will need to reengage with her outpatient treatment program and that she is not a transplant candidate currently.      # Decompensated cirrhosis w/ refractory ascities due to alcohol use  # Hyperbilirubinemia  # Elevated INR  # Mild hyponatremia  Etiology: ARLD  MELD-Na of 31 (12/13/2023)  Hepatic encephalopathy: Minimal HE  Ascites: Noted on examination/ultrasound   TIPS: No  Esophageal/Gastric varices: No prior EGD  Hepatocellular carcinoma: Last USS was 12/11/2023 w/ AFP 2.3 (11/2023)  Transplant: Not a candidate due to recent alcohol use  Nutrition: Weight is 181 lbs (12/2023)  Coagulopathy: INR 2.42  Thrombocytopenia: Related to cirrhosis  Paracentesis: 12/5, negative for SBP, Fluid Cx: NGTD     #FELIX, suspected ATN, improving  On admission Cr 0.67 which is within baseline. Over the course of hospitalization she required multiple large volume paracentesis and continued on diuretics. Cr elevated to 2.56. Treated with IV albumin x 3 days (completed 12/10) and spironolactone and lasix discontinued. Trending down, making current HRS less likely.     # Acute on chronic normocytic anemia requiring transfusions  Hemoglobin 5.5 on admission. Received a total of 4 units of pRBC since admission with improvement. No overt blood loss, likely hemolysis. Will need close outpatient follow up for cbc following discharge.     RECOMMENDATIONS:  -- Hold furosemide and spironolactone at discharge (plan for management of ascites with paracentesis with albumin replacement).   -- Plan for scheduled paracentesis outpatient  -- Start folic acid and thiamine  -- Follow up labs including CBC in week (will assess need for outpatient transfusions)  -- Plan for outpatient EGD  -- Diagnostic/therapeutic paracentesis 12/13, with cell count, gram stain and  culture, would not remove more than 5 L and would give 25% grams albumin with this (she is receiving blood today)   -- Hold lactulose PO   -- Adequate protein diet (1.2-1.5g/kg per day) w/ supplements in between meals  -- Continue antibiotics per ID recommendation    To be followed up outpatient:  -- Urine copper collection  and serum copper   -- Alpha 1 antitrypsin, hemachromatosis  -- Cervical cancer screening    Care plan formally discussed and patient seen with Dr. Mendez.     Vanessa Webster MD  Internal Medicine PGY 2      Physician Attestation   I saw this patient with the resident and agree with the resident/fellow's findings and plan of care as documented in the note.      Key findings: Discussed positive Peth  test with Ms. Lovell.  She reports drinking wine on Thanksgiving, was not sure if it was nonalcoholic wine.  Cannot tell me how much wine she had.  Denies drinking prior to or after this.  Discussed her Peth test is very elevated indicating heavy use.  She is not currently a transplant candidate.  Needs to reengage in higher intensity treatment.  Will repeat CD assessment in January she will have insurance at that time.    45 MINUTES SPENT BY ME on the date of service doing chart review, history, exam, documentation & further activities per the note.    I have personally reviewed the following data over the past 24 hrs:    13.1 (H)  \   6.9 (LL)   / 144 (L)     129 (L) 101 14.4 /  84   4.1 19 (L) 2.14 (H) \       ALT: <5 AST: 47 (H) AP: 91 TBILI: 3.3 (H)   ALB: 3.0 (L) TOT PROTEIN: 5.7 (L) LIPASE: N/A       INR:  2.54 (H) PTT:  N/A   D-dimer:  N/A Fibrinogen:  87 (LL)         Luci Mendez MD  Date of Service (when I saw the patient): 12/13/23

## 2023-12-13 NOTE — PLAN OF CARE
Patient alert and oriented x4, pleasant, calm, cooperative,ambulatory and independent with ADls. Skin jaundiced and abdomen distended. 24 hr urine collection completed and sent to lab at 1030. Fair appetite this shift. Was seen by rosendo, MD, ID and Psychology. Blood transfusion orders placed. PRBC 1 unit given as blood transfusion and completed without any immediate blood transfusion reaction. Low Fibrinogen level was noted and cryo precipitate was ordered thereafter.  Cryoprecipitate given and completed without any immediate transfusion reaction. Vs stable. Resting in bed as of this time. No other calls made.  P: Paracentesis tomorrow and possible discharge to home after.

## 2023-12-13 NOTE — PROGRESS NOTES
Care Management Follow Up    Length of Stay (days): 3    Expected Discharge Date: 12/14/2023     Concerns to be Addressed: financial/insurance, no discharge needs identified     Patient plan of care discussed at interdisciplinary rounds: Yes    Anticipated Discharge Disposition: Home with home infusion     Anticipated Discharge Services: [facility]  Anticipated Discharge DME: None    Patient/family educated on Medicare website which has current facility and service quality ratings: no  Education Provided on the Discharge Plan: Yes  Patient/Family in Agreement with the Plan: yes    Referrals Placed by CM/SW:  NA  Private pay costs discussed: Not applicable    Additional Information:    Per request from the provider, writer made a lab visit on 12/20 at [facility] in [place]. Writer left a message for infusion center at [facility] to call back to make 2 appointments for midline dressing change on 12/20 and take out once IVAB done on 12/25    Pt will get the midline tomorrow 12/14.    [facility] - $24.17/day till 12/25  Sisi [email or web address]  P: [phone]    Maren Horton RN  7C RN Care Coordinator  Phone: [phone]  Pager: [phone]  [place] [facility] ([id number]) Saturday & Sunday; ([id number])  Recognized Holidays   Units: 5A, 5B & 5C  Pager: [phone]  Units: 6B, 6C & 6D    Pager: [phone]  Units: 7A, 7B, 7C & 7D    Pager: [phone]  Units: 6A & ICU   Pager: [phone]  Units: 5 Ortho, 5MS & WB ED Pager: [phone]  Units: 6MS, 8A & 10 ICU  Pager [phone]

## 2023-12-13 NOTE — PROGRESS NOTES
[facility] CONSULTATION     Patient:  Priya Castano   Date of birth 1995, Medical record number [id number]  Date of Visit:  12/13/2023  Date of Admission: 12/10/2023  Consult Requester:Leonid Elliott DO          Assessment and Recommendations:   ID Problem List  MSSA positive blood culture 12/5  Decompensated alcohol related cirrhosis with ascites, coagulopathy, anemia, thrombocytopenia, and ?encephalopathy  RSV+, improving    RECOMMENDATION:  Follow up pending blood cultures - NGTD  OK for PICC if cultures remain negative at 48 hrs  Continue IV cefazolin 2g q8 hrs (renally dose adjusted) to complete 14 days for uncomplicated Staph bacteremia (12/11 - 12/25)  Needs weekly labs - CBC with diff, BMP, send to Dr. Hernandez  No general ID follow up needed  Recommend updating vaccines prior to transplant (ongoing pre-transplant workup)  Liver transplant team to arrange formal transplant ID evaluation as outpatient if needed    ASSESSMENT:  Priya Castano is a 28 year old female with PMHx significant for Hashimoto's thyroiditis, decompensated alcohol related cirrhosis c/b ascites and anemia, thrombocytopenia, coagulopathy who was transferred from [facility] to [facility] on 12/10 for worsening FELIX and pre-transplant workup. ID consulted for positive blood cultures.    Clinically presented with abdominal pain and distention, met sepsis criteria and had fever to Tmax 101.1F. She was found with +RSV (no O2 needs), +blood cultures 12/5 for two strains of MSSA with only one set collected from supposedly a 7 hr old peripheral IV (repeat 12/6 NGTD), and ascites with no evidence of SBP. Procalcitonin 0.15. Was treated with IV vancomycin for two days, ceftriaxone for 4 days before discontinued. Has remained afebrile, vitals stable, with downtrending leukocytosis since admission.     While her blood cultures may be a contaminant, Staph aureus is never treated as such due to potential virulence and would treat  "conservatively. Repeat blood cultures x2 on 12/11 remain NGTD. Continue IV cefazolin (renally adjusted) to complete 14 day course (12/11 - 12/25) for uncomplicated Staph bacteremia. Reassuring TTE without evidence of endocarditis, do not feel she needs TIFFANIE. See OPAT note below for labs. No clinic follow up needed for general ID. Will defer further transplant workup to transplant team and have them to arrange transplant ID clinic evaluation as indicated.    Thank you for this consult. ID will sign off.    Patient was discussed with Dr. Hernandez. Recommendations discussed with primary team.    Pina Gomes PA-C  Infectious Diseases  Pager #[phone]     45 MINUTES SPENT BY ME on the date of service doing chart review, history, exam, documentation & further activities per the note.      Primary team:  Place order panel  OP[facility] Pharmacy ([facility]) Review and ID Care Transition   Place imaging order(s) requested above prior to discharge.  Contact ID teams about missed ID clinic appointments and/or ongoing therapy needs.    Prolonged Parenteral/Oral Antibiotic Recommendations and ID Follow up  This template provides final ID recommendations as of this date.     Infectious Diseases Indication: MSSA uncomplicated bacteremia    Antibiotic Information  Name of Antibiotic Dose of Antibiotic1 Anticipated duration Effective start date2 End date   Cefazolin 2g q8hrs (renal adjusted) 14 days 12/11/23 12/25/23                 1.Dose of antibiotic will need to be renally adjusted if creatinine clearance changes  2.Effective start date is the date of adequate therapy with appropriate spectrum    Method of antibiotic delivery:PICC line.Is the line being used for another indication besides antimicrobials? No At the end of therapy should the line used for antimicrobials be removed or de-accessed? Yes. Selecting \"yes\" will function as written order to remove PICC line or de-access the indwelling line at the end of therapy.    Weekly labs required: " CBC with diff and BMP. Dr. Hernandez will follow labs at discharge until ID follow up. Fax labs to ID clinic.    Are there pending microbial tests: Yes Cultures     Imaging for ID follow up: ID Imaging: No.     No general ID clinic follow up needed.     ID provider route note: OPAT RN Care Coordinator [facility] and [facility] ID Clinic Information:  Phone: [phone]  Fax: [phone] (Attention Teo Martin)         Interval History     Priya reports feeling well, abdomen more distended but otherwise no new complaints. Plan for paracentesis today. She denies fever, chills, nausea, vomiting, diarrhea. Blood cultures 12/11 with NGTD.   Creatinine downtrending.    MELD 3.0: 34 at 12/13/2023  5:59 AM  MELD-Na: 31 at 12/13/2023  5:59 AM  Calculated from:  Serum Creatinine: 2.14 mg/dL at 12/13/2023  5:59 AM  Serum Sodium: 129 mmol/L at 12/13/2023  5:59 AM  Total Bilirubin: 3.3 mg/dL at 12/13/2023  5:59 AM  Serum Albumin: 3.0 g/dL at 12/13/2023  5:59 AM  INR(ratio): 2.54 at 12/13/2023  5:59 AM  Age at listing (hypothetical): 28 years  Sex: Female at 12/13/2023  5:59 AM         History of Present Illness:     Priya Castano is a 28 year old female with past medical history significant for Hashimoto's thyroiditis, decompensated alcohol related cirrhosis c/b ascites and anemia, coagulopathy who was transferred from [facility] to [facility] on 12/10 for worsening FELIX and pre-liver transplant workup. ID consulted for positive blood cultures at OSH.     She initially presented to OSH with abdominal pain and distention, admitted with sepsis. Was treated initially for presumed SBP (but not felt to have SBP - TNC = 1000, 3% neutrophils, Cx no growth, total protein 1.4). Received IV vancomycin x2 days + ceftriaxone x4 days. Was also positive for RSV on 12/5 with no oxygen requirements, managed with supportive care. Cough improving. Hospital course complicated by acute renal failure following paracentesis with  concern for hepatorenal vs ATN and acute on chronic anemia requiring multiple transfusions.     Blood cultures 12/5 with MSSA x2 strains in single set, 1/2 bottles. Reportedly this was drawn from a PIV that was 7 hrs old. Repeat blood cultures 12/6 x2 with no growth at 4 days. TTE without valvular abnormalities. She denies any previous history of bacteremia. No IVDU. Denies fever, chills, night sweats, skin rash, joint pain/swelling. No hardware or devices. Has no history of SBP.    Was told about liver disease in 2022, completed rehab and no further alcohol use since 7/2023. Was drinking 1 bottle of wine daily x4-5 years. Has family history of alcohol use disorder. Extended family lives in [place]. She lives in [place] with her parents. No pets, livestock. No recent travel. Has been to [place], [place] and [place]. No sick contacts. She works as an          Review of Systems:    ROS: 10 point ROS neg other than the symptoms noted above in the HPI.         Current Medications:      ceFAZolin  2 g Intravenous Q8H    escitalopram  5 mg Oral Daily    Followed by    [START ON 12/25/2023] escitalopram  10 mg Oral Daily    folic acid  1 mg Oral Daily    sodium chloride (PF)  3 mL Intracatheter Q8H            Allergies:     Allergies   Allergen Reactions    Bee Venom Hives and Shortness Of Breath            Physical Exam:   Vitals were reviewed  Patient Vitals for the past 24 hrs:   BP Temp Temp src Pulse Resp SpO2 Weight   12/13/23 1153 121/66 98.3  F (36.8  C) Oral 101 16 100 % --   12/13/23 1102 107/56 -- Oral 104 16 96 % --   12/13/23 1046 107/56 98.5  F (36.9  C) Oral 106 16 100 % --   12/13/23 0737 -- -- -- -- -- -- 83.5 kg (184 lb)   12/13/23 0557 127/71 98.6  F (37  C) Oral 101 18 100 % --   12/12/23 2110 110/61 98.1  F (36.7  C) Oral 107 -- 98 % --   12/12/23 1415 122/63 98  F (36.7  C) Oral 107 16 98 % --       Physical Examination:  Constitutional: Pleasant adult female seen laying in  bed, in NAD. Alert and interactive.   HEENT: NCAT, +icteric sclerae, conjunctiva clear. Moist mucous membranes without lesions or thrush. Dentition intact/well cared for.  Respiratory: Non-labored breathing, good air exchange. Lungs are clear to auscultation bilaterally, without wheezing, crackles or rhonchi.    Cardiovascular: Regular rate and rhythm, +systolic murmur  GI: Normoactive BS. Abdomen is soft, nontender to palpation, +distended. No rigidity or guarding. No rebound tenderness.  Skin: Warm and dry. No rashes or lesions on exposed surfaces. No peripheral stigmata of endocarditis.  Musculoskeletal: Extremities grossly normal. No tenderness or edema present.   Neurologic: A &O x3, speech normal, answering questions appropriately. Moves all extremities spontaneously. Grossly non-focal.  Neuropsychiatric: Mentation and affect normal/appropriate.  VAD: PIV is c/d/i with no erythema, drainage, or tenderness.         Laboratory Data:     Inflammatory Markers  No lab results found.    Hematology Studies    Recent Labs   Lab Test 12/13/23  0559 12/12/23  0915 12/12/23  0640 12/11/23  0634 12/10/23  0742 12/09/23  0719   WBC 13.1* 14.0* 12.6* 12.9* 12.6* 13.0*   HGB 6.9* 7.5* 6.6* 7.5* 7.2* 7.8*   MCV 90 91 92 92 89 89   * 165 140* 129* 110* 105*       Metabolic Studies     Recent Labs   Lab Test 12/13/23  0559 12/12/23  0640 12/11/23  0634 12/10/23  0742 12/09/23  0719   * 131* 131* 132* 131*   POTASSIUM 4.1 4.2 3.7 3.9 3.8   CHLORIDE 101 102 100 100 100   CO2 19* 20* 20* 22 22   BUN 14.4 11.8 9.1 8.6 8.3   CR 2.14* 2.37* 2.50* 2.56* 2.46*   GFRESTIMATED 31* 28* 26* 25* 27*       Hepatic Studies    Recent Labs   Lab Test 12/13/23  0559 12/12/23  0640 12/11/23  0634 12/10/23  0742 12/07/23  0630 12/06/23  0709 12/05/23  0517   BILITOTAL 3.3* 3.7* 4.3*  --  2.7* 4.4* 3.7*   ALKPHOS 91 90 101  --  192* 235* 238*   ALBUMIN 3.0* 3.0* 3.7 3.6 1.8* 2.3* 2.3*   AST 47* 54* 63*  --  97* 131* 153*   ALT <5 8 9   "--  24 29 34       Microbiology:  Culture   Date Value Ref Range Status   12/11/2023 No growth after 1 day  Preliminary   12/11/2023 No growth after 1 day  Preliminary   12/06/2023 No Growth  Final   12/06/2023 No Growth  Final   12/05/2023 No Growth  Final   12/05/2023 No Growth  Final   12/05/2023 Positive on the 1st day of incubation (A)  Final   12/05/2023 Staphylococcus aureus (AA)  Final     Comment:     1 of 2 bottles   12/05/2023 Staphylococcus aureus (AA)  Final     Comment:     1 of 2 bottles   07/28/2023 No Growth  Final   07/28/2023 No Growth  Final   07/28/2023 No Growth  Final       Urine Studies    Recent Labs   Lab Test 12/11/23  1405 12/09/23  1230 12/05/23  0518   LEUKEST Small* Negative Negative   WBCU 16* 5 9*       Vancomycin Levels  No lab results found.    Invalid input(s): \"VANCO\"    Hepatitis B Testing   Recent Labs   Lab Test 07/27/23  1556   HEPBANG Nonreactive   HBCM Nonreactive     Hepatitis C Testing     Hepatitis C Antibody   Date Value Ref Range Status   07/27/2023 Nonreactive Nonreactive Final     Respiratory Virus Testing    No results found for: \"RS\", \"FLUAG\"    IMAGING  US Abdomen Complete w Doppler Complete  Result Date: 12/11/2023  Impression: 1.  Liver cirrhosis with findings of portal hypertension including recanalized paraumbilical vein and retrograde flow within the right portal vein, moderate volume ascites and mild splenomegaly. 2.  Cholelithiasis and biliary sludge without evidence of acute cholecystitis.     US Paracentesis without Albumin  Result Date: 12/8/2023  A 5 Pashto catheter system was introduced into the abdominal ascites under ultrasound guidance. 5.0 liters of clear fluid were removed and sent to lab if requested.     XR Forearm Right 2 Views  Result Date: 12/7/2023  IMPRESSION: No evidence of acute fracture or dislocation. Joint spaces are preserved. Mild subcutaneous edema of the mid forearm.    US Paracentesis with Albumin  Result Date: 12/5/2023  10 " liters of clear fluid were removed.     IMPRESSION: 1.  Ultrasound-guided therapeutic paracentesis performed without complication. Reference CPT Code: 66822    XR Chest Port 1 View  Result Date: 12/5/2023  IMPRESSION: Low lung volumes but the lungs appear clear. Normal heart size and pulmonary vascularity. No pleural fluid or pneumothorax.    ECHO  Echo Complete  Result Date: 12/11/2023  Interpretation Summary No significant valvular abnormalities present. Likely flow murmur related to high cardiac output (estimated 10 L/min). Global and regional left ventricular function is normal with an EF of 60-65%. Global right ventricular function is normal. No pericardial effusion is present. There is no prior study for direct comparison.

## 2023-12-13 NOTE — PLAN OF CARE
Goal Outcome Evaluation:       A&O VSS except tachy on RA. PIV SL. Up independently to bathroom. 24 hour urine complete 12/13 10 AM. Pap smear this shift. Regular diet, tolerating. Plan for PICC placement and OP infusion for IV abx. Calls to make needs known.

## 2023-12-13 NOTE — PROGRESS NOTES
"[facility]     Addiction Progress Note - Addiction Service        Date of Admission:  12/10/2023  Assessment & Plan       Priya Castano is a 28F with a history of alcohol use disorder (in remission) and cirrhosis (presumed EtOH related) who was transferred to [facility] on 12/10 from [facility] for decompensated liver disease and acute kidney injury. She was transferred primarily for specialty evaluation and ongoing workup related to transplant.      # Alcohol Use Disorder  # Decompensated Liver Disease  # Acute Kidney Injury  Priya has a past history of heavy alcohol use, though denies any intake of alcohol since 7/27/23.   Triggers include boredom, stress, mental health triggers, among others.  She went through a 30 day program at [facility] in [place], [place] at that time, and then enrolled in their evening program for 2 days weekly with transition to weekly visits. She says that she still continues to work with this program. She now has methods to help distract herself from cravings (calling friends). Feels she has good social support at this time for continued abstinence from alcohol. She is interested in considering medication assistance as well.  Naltrexone may be a good therapeutic option for the patient once determined that her liver disease is stable, but hepatology prefers to hold off at this time. We also discussed acamprosate as a second line agent, but currently would defer due to renal function.    - she would also benefit from linkage to outpatient addiction medicine, see \"linkage to care,\" below     # Mental health: History of PTSD and Anxiety  -Agree with use of selective serotonin reuptake inhibitor and prn hydroxine     # Harm Reduction:  -hepatitis C serologies non-reactive 7/27/23  -HIV nonreactive 7/29/23      # Immunization review:   -Hep A: has completed primary 2-dose series in 2012 per [facility]  -Hep B: has completed vaccine series per [facility]     # Peer " "Support:   -Our peer  will meet the patient if agreeable and still hospitalized on Thursday, to provide additional outpatient resources  -To contact Yolette Peer  from [facility] ([facility]): call or text: [phone]     # Addiction Social Worker:   Our addiction social worker Anirudh Coyne can be contacted if needed, on her pager [phone] or texted/called at [phone]     # Linkage to Care:   -Connected with  hepatology and undergoing transplant workup. PCP recently established at [facility], last seen in 8/2023.  -Currently connected with outpatient program at [facility] in [place].  Will discuss linkage to outpatient addiction medicine for long term assistance meeting goals       Rolo De Dios MD on 12/13/2023 at 9:02 AM   Addiction Service   [facility]     Contact information available via [facility] Paging/Directory under \"addiction medicine\"        ______________________________________________________________________    Interval History No acute events.       ROS:  CV, Pulm, GI and  assessed. Pertinent positives as above, otherwise negative.     Data reviewed today: I reviewed all medications, new labs and imaging results over the last 24 hours.     Physical Exam   Temp: 98.6  F (37  C) Temp src: Oral BP: 127/71 Pulse: 101   Resp: 18 SpO2: 100 % O2 Device: None (Room air)    Gen: NAD  HEENT: EOMI, PERRL, MMM  CV: extremities warm and well perfused  Resp: breathing comfortably on RA  : deferred  Msk: no LE edema  Skin: no rashes  Neuro: nonfocal exam      Due to regulation of Title 42 of the Code of Federal Regulations (CFR) Part 2: Confidentiality laws apply to this note and the information wherein.  Thus, this note cannot be copy and pasted into any other health care staff's note nor can it be included in general medical records sent to ANY outside agency without the patient's written consent.    "

## 2023-12-13 NOTE — PROGRESS NOTES
Nephrology Progress Note  12/13/2023       Mrs Castano is 28-year-old female with past medical history significant for end-stage renal disease secondary to alcohol abuse complicated by recurrent large-volume ascites requiring paracenteses every 1 to 2 weeks who was admitted to [facility] on December 5, 2023 for evaluation of abdominal pain and distention.  Nephrology service is following for worsening of renal function.     Interval History :   Mrs Castano's Cr is improved over the past 48h from ~2.5=>2.1, likely recovering from high volume para in conjunction with NSAID's last week.  Na noted to be low, encouraged less H2O (can try to take in more protein/ensure) although level is not worrisome at 129.  Team is considering para today which is reasonable, would replace 12.5g albumin per liter pulled.  Considering PICC for longer term abx for + blood cultures on 12/5 (but negative otherwise), ideally would try to avoid as she is very young and PICC's do risk subclavian stenosis.      Assessment & Recommendations:   FELIX-Baseline Cr as low as 0.4 in 7/2023 but more recently has been ~0.8, had hyaline casts on UA consistent with prerenal or HRS.  Did receive toradol on 12/5 which has been stopped.  UPCR 0.2g/g, received 1g/g albumin x48h and Cr has plateaued at ~2.5.  Continuing supportive cares, there is some suspicion for infection with 1/2 cultures +.  Looking to do urine microscopy to see if there is evidence of ATN or if we should consider terlipressin for HRS.  BP's are higher than one would expect for HRS but did respond to midodrine relatively well.     -FELIX, hemodynamic with hyaline casts.  Likely hypovolemia with concurrent NSAID exposure on 12/5.  Improving   -Continuing midodrine, with Cr stable/improving the past 3 days we are continuing supportive cares   -Hepatology working up for liver transplant.     Volume status-Minimal peripheral edema, + abd distension with ascites.  Breathing comfortably  on RA even lying flat.      Electrolytes/pH-K 4.1, bicarb 19, Na 129    Hepatology-Has noticed ascites for the past ~2 months, Meld 3.0 ~35, getting workup for liver tx, sober since 7/2023 and there is some ? Of other etiology than ETOH.      Ca/phos/pth-Ca 8.0, Mg 2.0, Phos 2.6 last check.    Anemia-Hgb 6.9, checked iron stores which were above detectable level.      Nutrition-Taking PO but supect poor intake with BUN of 9 at time of consult.      Time spent: 45 minutes on this date of encounter for chart review, physical exam, medical decision making and co-ordination of care.     Seen and discussed with Dr Downs    Recommendations were communicated to primary team via verbal communication.     LAZ Khanna CNS  Clinical Nurse Specialist  [phone]      Review of Systems:   I reviewed the following systems:  Gen: No fevers or chills  CV: No CP at rest  Resp: No SOB at rest  GI: No N/V    Physical Exam:   I/O last 3 completed shifts:  In: 463 [P.O.:360; I.V.:103]  Out: 275 [Urine:275]   /71 (BP Location: Right arm, Patient Position: Semi-Mccoy's, Cuff Size: Adult Regular)   Pulse 101   Temp 98.6  F (37  C) (Oral)   Resp 18   Wt 80.3 kg (177 lb 0.5 oz)   SpO2 100%   BMI 31.86 kg/m       GENERAL APPEARANCE: + abd distension but in no distress  EYES: + scleral icterus, pupils equal  HENT: mouth without ulcers or lesions  PULM: lungs clear to auscultation, equal air movement, no cyanosis or clubbing  CV: regular rhythm, normal rate     -edema trace LE  GI: soft, non-tender, moderately distended  MS: no evidence of inflammation in joints, no muscle tenderness  NEURO: mentation intact and speech normal    Labs:   All labs reviewed by me  Electrolytes/Renal -   Recent Labs   Lab Test 12/13/23  0559 12/12/23  0640 12/11/23  0634 12/10/23  0742 12/09/23  0719 12/08/23  1354 12/08/23  0719 12/06/23  1424 12/06/23  0709 08/08/23  1140 07/30/23  0639   * 131* 131* 132* 131*  --  129*   < > 132*    < > 139   POTASSIUM 4.1 4.2 3.7 3.9 3.8   < > 3.4   < > 3.0*   < > 4.1   CHLORIDE 101 102 100 100 100  --  99   < > 101   < > 109*   CO2 19* 20* 20* 22 22  --  21*   < > 24   < > 25   BUN 14.4 11.8 9.1 8.6 8.3  --  8.3   < > 5.4*   < > 5.6*   CR 2.14* 2.37* 2.50* 2.56* 2.46*  --  2.30*   < > 1.11*   < > 0.58   GLC 84 94 92 100* 87  --  95   < > 80   < > 101*   JULIAN 8.0* 8.1* 8.5* 8.4* 8.1*  --  7.3*   < > 7.3*   < > 7.6*   MAG  --   --   --  2.0 2.0  --  2.2   < > 1.1*  --  1.8   PHOS  --   --  2.6  --   --   --   --   --  3.1  --  3.9    < > = values in this interval not displayed.       CBC -   Recent Labs   Lab Test 12/13/23  0559 12/12/23  0915 12/12/23  0640   WBC 13.1* 14.0* 12.6*   HGB 6.9* 7.5* 6.6*   * 165 140*       LFTs -   Recent Labs   Lab Test 12/13/23  0559 12/12/23  0640 12/11/23  0634   ALKPHOS 91 90 101   BILITOTAL 3.3* 3.7* 4.3*   ALT <5 8 9   AST 47* 54* 63*   PROTTOTAL 5.7* 5.7* 6.6   ALBUMIN 3.0* 3.0* 3.7       Iron Panel -   Recent Labs   Lab Test 12/11/23  0634 12/10/23  2118   IRON 61  --    ADDY  --  70           Current Medications:   ceFAZolin  2 g Intravenous Q8H    escitalopram  5 mg Oral Daily    Followed by    [START ON 12/25/2023] escitalopram  10 mg Oral Daily    folic acid  1 mg Oral Daily    sodium chloride (PF)  3 mL Intracatheter Q8H

## 2023-12-13 NOTE — CONSULTS
"    Health Psychology Inpatient Consult Psychodiagnostic Assessment     Health Psychology Office   [facility] Psychology Clinic   Department of Medicine   [address] [place]  3rd Floor  [address] [place] [address]     Health Psychology Team:  Maira Guerrero, Ph.D., L.P [phone]  Cass Diallo, Ph.D., L.P. [phone]  Eduardo Aguiar, Ph.D. [phone]  Rufina Rhoades, Ph.D., A.B.P.P., L.P. [phone]  Juan Miguel Sanchez, Ph.D., A.B.P.P., L.P. [phone]         Tanja Velasquez, Ph.D., L.P. [phone]   Lizbeth Setxon, Ph.D., A.B.P.P., L.P. [phone]  Fausto Lopez, Ph.D. [phone]    Confidential Summary of Inpatient Health Psychology Consultation*    Sources of Information:  Information was obtained from a clinical interview with the patient and review of medical record.    Referral Source/Reason:  Ms. Castano was referred to Health Psychology by Maira Gillis for anxiety sxs, particularly around cirrhosis and need for liver transplant.     History of Presenting Concerns:  Ms. Castano is a 28 year old y/o female currently inpatient for 3 days, starting on 12/10/2023 for Cirrhosis of liver (H) [K74.60]. Pt reported mood as \"good \"at time of encounter.  Patient reported symptoms of anxiety particularly worry that have worsened since hospitalization last week.  Patient reported a lifetime history of anxiety symptoms and stated that current need for liver transplant and uncertainty around her current medical plan of exacerbated symptoms.  Patient reported finding mindful meditation to be somewhat helpful during bouts of anxiety but requested further tools to support stress management during this time.  Writer engaged patient in supportive listening and cognitive processing with current medical status and emotions.  Writer additionally engaged patient in psychoeducation and skills training in " relaxation skills including square breathing technique and body scan meditation.  Writer and patient also discussed the ongoing importance of social support and her ongoing care.  Patient reported feeling that she has a strong social support network, particularly her mother.    Patient Demographics (per chart):   Age: 28 year old  Biological Sex: female  Race: White  Ethnicity:  or     Hospital Admission (per chart):  Admission Date: 12/10/2023  Admission Diagnosis: Cirrhosis of liver (H) [K74.60]  Length of Stay: 3    Medical History:  See below lists for past medical history, past surgical history, and current medications  Patient Active Problem List   Diagnosis    Hypothyroidism    Hypothyroidism    Elevated bilirubin    Acute alcoholic intoxication in alcoholism without complication (H)    Ascites due to alcoholic cirrhosis (H)    Acute alcoholic hepatitis (H28)    SIRS (systemic inflammatory response syndrome) (H)    Alcoholic cirrhosis of liver with ascites (H)    Anemia, unspecified type    Acute bronchitis due to respiratory syncytial virus (RSV)    FELIX (acute kidney injury) (H24)    Cirrhosis of liver (H)      Past Medical History:   Diagnosis Date    Alcoholic cirrhosis of liver with ascites (H)     Hypothyroidism     SBP (spontaneous bacterial peritonitis) (H)      Past Surgical History:   Procedure Laterality Date    IR PARACENTESIS  7/28/2023     Current Facility-Administered Medications   Medication    acetaminophen (TYLENOL) tablet 650 mg    albumin human 25 % injection 50 g    calcium carbonate (TUMS) chewable tablet 1,000 mg    ceFAZolin (ANCEF) 2 g in 100 mL D5W intermittent infusion    escitalopram (LEXAPRO) tablet 5 mg    Followed by    [START ON 12/25/2023] escitalopram (LEXAPRO) tablet 10 mg    folic acid (FOLVITE) tablet 1 mg    guaiFENesin (ROBITUSSIN) 20 mg/mL solution 200 mg    hydrOXYzine HCl (ATARAX) tablet 25 mg    Or    hydrOXYzine HCl (ATARAX) tablet 50 mg    lidocaine  (LMX4) cream    lidocaine (LMX4) cream    lidocaine 1 % 0.1-1 mL    lidocaine 1 % 0.1-5 mL    melatonin tablet 5 mg    naloxone (NARCAN) injection 0.2 mg    Or    naloxone (NARCAN) injection 0.4 mg    Or    naloxone (NARCAN) injection 0.2 mg    Or    naloxone (NARCAN) injection 0.4 mg    ondansetron (ZOFRAN ODT) ODT tab 4 mg    Or    ondansetron (ZOFRAN) injection 4 mg    oxyCODONE (ROXICODONE) tablet 5 mg    senna-docusate (SENOKOT-S/PERICOLACE) 8.6-50 MG per tablet 1 tablet    Or    senna-docusate (SENOKOT-S/PERICOLACE) 8.6-50 MG per tablet 2 tablet    sodium chloride (PF) 0.9% PF flush 10-40 mL    sodium chloride (PF) 0.9% PF flush 3 mL    sodium chloride (PF) 0.9% PF flush 3 mL       Social History:  Current living arrangements: Patient reported she was recently living with her ex-boyfriend, but has recently transitioned to living at home with her parents and 1 sibling.  Relationship status/family: Patient denied current romantic relationship.  Patient denied previous marriage.  Patient denied children.  Patient reported being very close with her mother and father, whom adopted patient during childhood and are her aunt and uncle.  Patient denied contact with biological mother or father.  Social support network: Patient reported a social support network including her family and a few close friends.  Patient reported interest in joining liver transplant support group.  Abuse/trauma history: Patient denied history of abuse or trauma.  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  Educational history: Patient reported obtaining an associates degree.    Psychiatric History:  Depression: Patient denied depression symptoms.  Anxiety: Patient reported anxiety symptoms including worrying that is hard to control, restlessness, and some irritability.  Patient reported symptoms worsened at time of hospitalization.  Patient reported history of panic attacks  approximately 2 times a year.  Trauma: Patient denied trauma symptoms.  Attention: Patient denied hx/current attention-related symptoms.   Psychosis: Patient denied hx/current psychotic symptoms.   Mariza: Patient denied hx/current manic symptoms  Disordered eating: Patient denied hx/current symptoms of disordered eating.   Suicide/NSSI: Patient denied hx/current suicidal ideation/plan/intent. Patient denied hx/current NSSI.   Mental health treatment: Patient reported current attendance at weekly outpatient alcohol abuse group treatment.  Patient reported a history of residential alcohol use disorder treatment during the summer 2023.  Patient additionally noted recently getting to see a psychotherapist prior to hospitalization.  Psychiatric hospitalization: Patient reported recent history of residential treatment for alcohol use disorder.  Patient denied other history of residential treatment or psychiatric hospitalization.    Mental Status/Interview:  Appearance:   Appropriate   Eye Contact:   Good   Psychomotor Behavior:  Normal   Attitude:   Cooperative   Orientation:   All  Speech   Rate / Production: Normal    Volume:  Normal   Mood:    Normal  Affect:    Appropriate   Thought Content:   Clear  Thought Form:   Coherent  Logical     Insight:    Did not formally assess at this time.     Suicidal ideation: Pt denied active suicidal ideation.   Homicidal ideation: Pt denied active homicidal ideation.     [place] Suicide Severity Rating Scale Screener (C-SSRS) Past Month  Always ask questions 1 and 2 Past month   1. Wish to be dead (Have you wished you were dead or wished you could go to sleep and not wake up?) No   2. Non-specific active suicidal thoughts (Have you actually had any thoughts of killing yourself?) No   If YES to 2, ask questions 3, 4, 5 and 6.  If NO to 2, skip to question 6.    3. Active suicidal ideation with any methods without intent to act (Have you been thinking about how you might do this?)     4. Active Suicidal Ideation with Some Intent to Act, without Specific Plan (Have you had these thoughts and had some intention of acting on them?)    5. Active Suicidal Ideation with Specific Plan and Intent (Have you started to work out or worked out the details of how to kill yourself? Did you intend to carry out this plan?)    Always ask question 6    6a. (Have you done anything, started to do anything, or prepared to do anything to end your life?) No   6b. (Was this within the past 3 months?)    JANIE Whitfield, et al., (2008). [place]-suicide severity rating scale (C-SSRS). [place], [place]: [facility], 10, 2008.    Impression:  Ms. Castano is a 28 year old y/o female currently inpatient for 3 days, starting on 12/10/2023 for Cirrhosis of liver (H) [K74.60] who presented today in hospital room. Patient was engaged in the visit and expressed understanding of information provided. Pt presented with symptoms of anxiety. Pt symptoms are likely maintained by ongoing hospitalization and uncertainty regarding medical future. Symptom reduction would likely be facilitated by engagement in mindfulness based stress reduction. Pt was agreeable to follow up with Health Psychology Team.     Plan:  Plan for health psychology to follow this patient approximately once per week for the duration of their hospitalization. Please feel free to call in urgent concerns arise prior to the next follow-up session.    Recommendations for Care Team:  When possible, please encourage/commend efforts in practicing skills taught by Health Psychology Team. It is recommended that skills are practiced at least 3X daily.     Continue to monitor changes in patient's mood. While not an exhaustive list, examples of symptoms of note include increases in: sadness/hopelessness, time spent worrying, physiological responses to stress, and decreases in: sleep, appetite, socialization, engagement in care.     Skills taught today:    -Square breathing  -Body scan meditation  -Cognitive processing of emotions with social supports    Diagnosis:  Adjustment disorder with anxious mood (ICD-10: F43.20)  Severe alcohol dependence in early remission    CPT Code:  [place] PSYCHIATRIC DIAGNOSTIC EVALUATION [[id number]]    Session Length:  Start Time: 9:00am  End Time: 9:30am    Date of Service:  12/13/23    Fausto Lopez, PhD  Clinical Health Psychology Fellow  Phone: [phone]  Pager: [phone]    This case is being supervised by Rufina Rhoades, PhD, , ABPP.    This note was completed using Dragon voice recognition software. Although reviewed after completion, some word and grammatical errors may occur.    *In accordance with the Rules of the [place] Board of [employer], it is noted that psychological descriptions and scientific procedures underlying psychological evaluations have limitations.  Absolute predictions cannot be made based on information in this report.

## 2023-12-13 NOTE — PROGRESS NOTES
[facility]    Medicine Progress Note - Hospitalist Service, GOLD TEAM 8    Date of Admission:  12/10/2023    Assessment & Plan   Priya Castano is a 28 year old female w/ cirrhosis (currently attributed to alcohol use) admitted on 12/10/2023. She is a transfer from [facility] for further workup for worsening kidney function ISO liver cirrhosis and planning for transplant workup. GI and nephrology consulted on admission, appreciate assistance. Patient was seen by nephrology and given improvement in renal function FELIX felt likely ATN due to torodol in conjunction with high volume para. As she had positive MSSA blood cultures at [place] ID was consulted and it was decided to treat for uncomplicated MSSA bacteremia with two weeks of IV cefazolin. We are now waiting on a twenty four hour urine copper and 48 hours negative blood cultures prior to putting in PICC line and discharge with home abx.     Decompensated Cirrhosis w/ ascites   Follows w/ [facility] hepat[facility], Dr. Mendez. Currently undergoing liver transplant workup. Received paracentesis on 12/5, 11/27, 11/10. Outpatient, removing 5-6L,  followed by albumin and previously on spironolactone and furosemide PTA (stopped 12/6 2/2 concern for prerenal FELIX as below). No evidence of SBP on ascites fluid analysis, fluid cx NGTD (para 12/5). Treated w/ IV ceftriaxone (12/5- 12/8) and IV vancomycin (12/5- 12/6). Etiology of cirrhosis felt likely alcohol at this point. Has not had alcohol since 7/2723.   - Hepatology consult placed, appreciate recs  - Daily MELD labs  - Daily CMP  - Additional cirrhosis workup negative so far except low ceruloplasmin   - 24 hour urine copper collection starting 12/12  - Continue midodrine 2.5mg TID for now, consider discontinuation if HRS less likely  - para 12/13 with 12.5 g albumin with each liter taken off     FELIX likely secondary to ATN  Seen by nephrology at [facility]-- suspected  FELIX likely 2/2 high volume paracenteses and over-diuresis. However pt now s/p albumin challenge (6 50g 25% albumin doses since 12/7 without improvement), PTA diuretics held, yet Cr continues to worsen. Urine sodium 20, not consistent w/ HRS. Now seems most likely ATN due to NSAID use plus paracentesis as Kidney function starting to improve.  - Nephrology consult placed, appreciate recs  - HOLD all nephrotoxic medications  - Daily CMP to trend renal function and electrolytes  - Strict I&Os to monitor urine output  - Renal US  - Repeat UA w/ microscopic  - Urine studies: protein, sodium     MSSA positive blood culture 12/5   Noted at [facility] and felt to be contaminant as blood culture was drawn from old peripheral IV.  Repeat blood cultures have been negative and she has no systemic symptoms of infection.  ID was consulted who felt given the risk of infection with underlying cirrhosis will treat for uncomplicated MSSA bacteremia.  -2 weeks of IV cefazolin 2 g every 8 hours  -Plan for PICC placement 48-72 hours after negative blood cultures     Hyponatremia  Low Na in setting of liver disease possibly made worse by hypovolemia  - CMP daily     Anemia, chronic w/ acute drop, improved  Thrombocytopenia  Folate deficiency   On admission to [facility] 12/4, hgb 5.5, received 3U RBCs, hgb up to nearly 8, now hovering just above 7. Normocytic. Plt 105 on admission.  Workup notable for low folate, normal B12, and normal ferritin albeit in the setting of cirrhosis.  - Daily CBC to monitor hgb for now  - Transfuse < 7   - 1 unit pRBC 12/13  -Start folic acid 1 mg daily  -Peripheral smear and reticulocyte count ordered     RSV positive 12/5  Admitted to [facility] 12/4/23 w/ c/f sepsis, RSV positive, also tx for presumed SBP (now ruled out). Presented w/ tachycardia (123), leukocytosis (16.2). Treated w/ IV ceftriaxone (12/5- 12/8) and IV vancomycin (12/5- 12/6).  Supportive cares for RSV. No documentation of need for  "supplemental O2.    - Supportive cares for RSV-- IS, guaifenesin prn  - Continue to monitor WBC     Systolic murmur  Appreciated on exam, Grade III.   - Echocardiogram     Acquired hypothyroidism due to Hashimoto's thyroiditis  Elevated TSH, normal free T4 12.5.  - Noted, no PTA meds at this time    PTSD  Anxiety  Has anxiety related to her liver disease. Was given ativan at [place]. Discussed anxiety with patient who is agreeable to starting selective serotonin reuptake inhibitor and hydroxyzine while stopping ativan.   - start lexapro 5 mg x 2 weeks then 10 mg daily  - Hydroxyzine 25-50 mg Q6H PRN  - Health psych consult           Diet: Renal Diet (non-dialysis)    DVT Prophylaxis: Pneumatic Compression Devices  Short Catheter: Not present  Lines: None     Cardiac Monitoring: None  Code Status: Full Code      Clinically Significant Risk Factors         # Hyponatremia: Lowest Na = 129 mmol/L in last 2 days, will monitor as appropriate      # Hypoalbuminemia: Lowest albumin = 3 g/dL at 12/13/2023  5:59 AM, will monitor as appropriate    # Coagulation Defect: INR = 2.54 (Ref range: 0.85 - 1.15) and/or PTT = 50 Seconds (Ref range: 22 - 38 Seconds), will monitor for bleeding   # Acute Kidney Injury, unspecified: based on a >150% or 0.3 mg/dL increase in last creatinine compared to past 90 day average, will monitor renal function         # Obesity: Estimated body mass index is 33.12 kg/m  as calculated from the following:    Height as of 12/4/23: 1.588 m (5' 2.5\").    Weight as of this encounter: 83.5 kg (184 lb)., PRESENT ON ADMISSION     # Financial/Environmental Concerns: insurance inadequate         Disposition Plan      Expected Discharge Date: 12/14/2023      Destination: home;home with family  Discharge Comments: needs IV abx until 12/25 getting PICC placed today. On 24 hour urine collection for coppuer otherwise no further transplant workup, possibly could go as early as tomorrow.            Leonid Elliott, " DO  Hospitalist Service, GOLD TEAM 8  [facility]  Securely message with [employer] (more info)  Text page via [employer] Paging/Directory   See signed in provider for up to date coverage information  ______________________________________________________________________    Interval History     No acute events overnight. Hgb low this morning. No signs of blood in stool. She is feeling well today, does endorse increased abdominal swelling and tenseness so will get para. Talked to ID who want at least 48 hours negative prior to putting in PICC line so waiting on that. Otherwise hopefully will be set up for possible discharge tomorrow.    Physical Exam   Vital Signs: Temp: 98.3  F (36.8  C) Temp src: Oral BP: 121/66 Pulse: 101   Resp: 16 SpO2: 100 % O2 Device: None (Room air)    Weight: 184 lbs 0 oz    General Appearance: In bed, no distress   Respiratory: breathing comfortably on room air   Cardiovascular: RRR  GI: distended non tender   Skin: no rashes or lesions   Other: Non focal neuro exam      Medical Decision Making       55 MINUTES SPENT BY ME on the date of service doing chart review, history, exam, documentation & further activities per the note.      Data     I have personally reviewed the following data over the past 24 hrs:    13.1 (H)  \   6.9 (LL)   / 144 (L)     129 (L) 101 14.4 /  84   4.1 19 (L) 2.14 (H) \     ALT: <5 AST: 47 (H) AP: 91 TBILI: 3.3 (H)   ALB: 3.0 (L) TOT PROTEIN: 5.7 (L) LIPASE: N/A     INR:  2.54 (H) PTT:  N/A   D-dimer:  N/A Fibrinogen:  87 (LL)       Imaging results reviewed over the past 24 hrs:   No results found for this or any previous visit (from the past 24 hour(s)).

## 2023-12-13 NOTE — PLAN OF CARE
Goal Outcome Evaluation:      Plan of Care Reviewed With: patient    Overall Patient Progress: no changeOverall Patient Progress: no change    Outcome Evaluation: Hgb trending down, got blood transfused, on IVAB till 12/25.    Maren Horton RNCC

## 2023-12-13 NOTE — CONSULTS
GYN team consulted for pap for preparation for possible liver transplant. Discussed with primary team and there are no other gyn concerns. Please see note by Dr. Lovett for exam and pap collection. Please reach out to gyn team if further needs arise.     Benny Apple MD  OB/GYN PGY-4  12/13/2023 6:20 AM    Appreciate Dr. Apple's note above. I agree with the plan above.   Luci Rayo MD

## 2023-12-14 ENCOUNTER — ANCILLARY PROCEDURE (OUTPATIENT)
Dept: ULTRASOUND IMAGING | Facility: CLINIC | Age: 28
End: 2023-12-14
Attending: STUDENT IN AN ORGANIZED HEALTH CARE EDUCATION/TRAINING PROGRAM

## 2023-12-14 VITALS
TEMPERATURE: 98.6 F | HEART RATE: 107 BPM | BODY MASS INDEX: 33.12 KG/M2 | WEIGHT: 184 LBS | DIASTOLIC BLOOD PRESSURE: 72 MMHG | SYSTOLIC BLOOD PRESSURE: 118 MMHG | RESPIRATION RATE: 20 BRPM | OXYGEN SATURATION: 98 %

## 2023-12-14 LAB
A1AT PHENOTYP SERPL-IMP: NORMAL
A1AT SERPL-MCNC: 94 MG/DL
ABSOLUTE NEUTROPHILS, BODY FLUID: 9.4 /UL
ALBUMIN SERPL BCG-MCNC: 3 G/DL (ref 3.5–5.2)
ALP SERPL-CCNC: 93 U/L (ref 40–150)
ALT SERPL W P-5'-P-CCNC: <5 U/L (ref 0–50)
ANION GAP SERPL CALCULATED.3IONS-SCNC: 10 MMOL/L (ref 7–15)
APPEARANCE FLD: CLEAR
AST SERPL W P-5'-P-CCNC: 42 U/L (ref 0–45)
BILIRUB SERPL-MCNC: 3.2 MG/DL
BKR LAB AP GYN ADEQUACY: NORMAL
BKR LAB AP GYN INTERPRETATION: NORMAL
BKR LAB AP HPV REFLEX: NO
BKR LAB AP PREVIOUS ABNORMAL: NORMAL
BUN SERPL-MCNC: 16.8 MG/DL (ref 6–20)
CALCIUM SERPL-MCNC: 8.1 MG/DL (ref 8.6–10)
CELL COUNT BODY FLUID SOURCE: NORMAL
CHLORIDE SERPL-SCNC: 101 MMOL/L (ref 98–107)
COLOR FLD: YELLOW
COPPER SERPL-MCNC: 34.3 UG/DL
CREAT SERPL-MCNC: 1.71 MG/DL (ref 0.51–0.95)
DEPRECATED HCO3 PLAS-SCNC: 19 MMOL/L (ref 22–29)
EGFRCR SERPLBLD CKD-EPI 2021: 41 ML/MIN/1.73M2
ERYTHROCYTE [DISTWIDTH] IN BLOOD BY AUTOMATED COUNT: 21.7 % (ref 10–15)
FIBRINOGEN PPP-MCNC: 102 MG/DL (ref 170–490)
GLUCOSE SERPL-MCNC: 91 MG/DL (ref 70–99)
HCT VFR BLD AUTO: 25.5 % (ref 35–47)
HGB BLD-MCNC: 8.1 G/DL (ref 11.7–15.7)
INR PPP: 2.11 (ref 0.85–1.15)
LYMPHOCYTES NFR FLD MANUAL: 7 %
MCH RBC QN AUTO: 29 PG (ref 26.5–33)
MCHC RBC AUTO-ENTMCNC: 31.8 G/DL (ref 31.5–36.5)
MCV RBC AUTO: 91 FL (ref 78–100)
MONOS+MACROS NFR FLD MANUAL: 83 %
NEUTS BAND NFR FLD MANUAL: 10 %
PATH REPORT.COMMENTS IMP SPEC: NORMAL
PATH REPORT.COMMENTS IMP SPEC: NORMAL
PATH REPORT.RELEVANT HX SPEC: NORMAL
PLATELET # BLD AUTO: 155 10E3/UL (ref 150–450)
POTASSIUM SERPL-SCNC: 3.9 MMOL/L (ref 3.4–5.3)
PROT SERPL-MCNC: 5.9 G/DL (ref 6.4–8.3)
RBC # BLD AUTO: 2.79 10E6/UL (ref 3.8–5.2)
SODIUM SERPL-SCNC: 130 MMOL/L (ref 135–145)
WBC # BLD AUTO: 13.6 10E3/UL (ref 4–11)
WBC # FLD AUTO: 94 /UL

## 2023-12-14 PROCEDURE — 250N000009 HC RX 250: Performed by: STUDENT IN AN ORGANIZED HEALTH CARE EDUCATION/TRAINING PROGRAM

## 2023-12-14 PROCEDURE — 99239 HOSP IP/OBS DSCHRG MGMT >30: CPT | Performed by: STUDENT IN AN ORGANIZED HEALTH CARE EDUCATION/TRAINING PROGRAM

## 2023-12-14 PROCEDURE — 36569 INSJ PICC 5 YR+ W/O IMAGING: CPT

## 2023-12-14 PROCEDURE — 85384 FIBRINOGEN ACTIVITY: CPT | Performed by: STUDENT IN AN ORGANIZED HEALTH CARE EDUCATION/TRAINING PROGRAM

## 2023-12-14 PROCEDURE — 85610 PROTHROMBIN TIME: CPT | Performed by: STUDENT IN AN ORGANIZED HEALTH CARE EDUCATION/TRAINING PROGRAM

## 2023-12-14 PROCEDURE — 85027 COMPLETE CBC AUTOMATED: CPT | Performed by: STUDENT IN AN ORGANIZED HEALTH CARE EDUCATION/TRAINING PROGRAM

## 2023-12-14 PROCEDURE — 49083 ABD PARACENTESIS W/IMAGING: CPT | Performed by: STUDENT IN AN ORGANIZED HEALTH CARE EDUCATION/TRAINING PROGRAM

## 2023-12-14 PROCEDURE — 89050 BODY FLUID CELL COUNT: CPT | Performed by: STUDENT IN AN ORGANIZED HEALTH CARE EDUCATION/TRAINING PROGRAM

## 2023-12-14 PROCEDURE — P9047 ALBUMIN (HUMAN), 25%, 50ML: HCPCS | Performed by: STUDENT IN AN ORGANIZED HEALTH CARE EDUCATION/TRAINING PROGRAM

## 2023-12-14 PROCEDURE — 0W9G3ZZ DRAINAGE OF PERITONEAL CAVITY, PERCUTANEOUS APPROACH: ICD-10-PCS | Performed by: STUDENT IN AN ORGANIZED HEALTH CARE EDUCATION/TRAINING PROGRAM

## 2023-12-14 PROCEDURE — 80053 COMPREHEN METABOLIC PANEL: CPT | Performed by: STUDENT IN AN ORGANIZED HEALTH CARE EDUCATION/TRAINING PROGRAM

## 2023-12-14 PROCEDURE — 272N000020 HC KIT OPEN ENDED SINGLE LUMEN

## 2023-12-14 PROCEDURE — 99207 PR APP CREDIT; MD BILLING SHARED VISIT: CPT | Performed by: INTERNAL MEDICINE

## 2023-12-14 PROCEDURE — 36415 COLL VENOUS BLD VENIPUNCTURE: CPT | Performed by: STUDENT IN AN ORGANIZED HEALTH CARE EDUCATION/TRAINING PROGRAM

## 2023-12-14 PROCEDURE — 250N000013 HC RX MED GY IP 250 OP 250 PS 637: Performed by: STUDENT IN AN ORGANIZED HEALTH CARE EDUCATION/TRAINING PROGRAM

## 2023-12-14 PROCEDURE — 250N000011 HC RX IP 250 OP 636: Mod: JZ | Performed by: STUDENT IN AN ORGANIZED HEALTH CARE EDUCATION/TRAINING PROGRAM

## 2023-12-14 PROCEDURE — 87070 CULTURE OTHR SPECIMN AEROBIC: CPT | Performed by: STUDENT IN AN ORGANIZED HEALTH CARE EDUCATION/TRAINING PROGRAM

## 2023-12-14 RX ORDER — FOLIC ACID 1 MG/1
1 TABLET ORAL DAILY
Qty: 90 TABLET | Refills: 3 | Status: SHIPPED | OUTPATIENT
Start: 2023-12-15

## 2023-12-14 RX ORDER — CEFAZOLIN SODIUM 2 G/100ML
2 INJECTION, SOLUTION INTRAVENOUS EVERY 8 HOURS
Qty: 3300 ML | Refills: 0 | Status: ACTIVE | OUTPATIENT
Start: 2023-12-14 | End: 2023-12-25

## 2023-12-14 RX ORDER — LIDOCAINE 40 MG/G
CREAM TOPICAL
Status: DISCONTINUED | OUTPATIENT
Start: 2023-12-14 | End: 2023-12-14 | Stop reason: HOSPADM

## 2023-12-14 RX ORDER — ESCITALOPRAM OXALATE 10 MG/1
TABLET ORAL
Qty: 365 TABLET | Refills: 0 | Status: SHIPPED | OUTPATIENT
Start: 2023-12-15 | End: 2024-12-24

## 2023-12-14 RX ADMIN — FOLIC ACID 1 MG: 1 TABLET ORAL at 07:59

## 2023-12-14 RX ADMIN — ALBUMIN HUMAN 50 G: 0.25 SOLUTION INTRAVENOUS at 10:32

## 2023-12-14 RX ADMIN — OXYCODONE HYDROCHLORIDE 5 MG: 5 TABLET ORAL at 01:09

## 2023-12-14 RX ADMIN — CEFAZOLIN SODIUM 2 G: 2 INJECTION, SOLUTION INTRAVENOUS at 13:33

## 2023-12-14 RX ADMIN — OXYCODONE HYDROCHLORIDE 5 MG: 5 TABLET ORAL at 08:02

## 2023-12-14 RX ADMIN — LIDOCAINE HYDROCHLORIDE 1 ML: 10 INJECTION, SOLUTION EPIDURAL; INFILTRATION; INTRACAUDAL; PERINEURAL at 09:19

## 2023-12-14 RX ADMIN — OXYCODONE HYDROCHLORIDE 5 MG: 5 TABLET ORAL at 14:46

## 2023-12-14 RX ADMIN — ESCITALOPRAM 5 MG: 5 TABLET, FILM COATED ORAL at 07:59

## 2023-12-14 RX ADMIN — CEFAZOLIN SODIUM 2 G: 2 INJECTION, SOLUTION INTRAVENOUS at 06:06

## 2023-12-14 ASSESSMENT — ACTIVITIES OF DAILY LIVING (ADL)
ADLS_ACUITY_SCORE: 20

## 2023-12-14 NOTE — PLAN OF CARE
Goal Outcome Evaluation:      Plan of Care Reviewed With: patient    Overall Patient Progress: no changeOverall Patient Progress: no change    Outcome Evaluation: Pt A&Ox4 with soft BP. Pain managed with prn po oxy. pain increase when coughing, refused Robitussin offered. 2x IVABx on shift. Voiding spontaneously. No acute changes overnight. Plan is to get a paracentesis today.

## 2023-12-14 NOTE — PROGRESS NOTES
Nephrology Progress Note  12/14/2023       Mrs Castano is 28-year-old female with past medical history significant for end-stage renal disease secondary to alcohol abuse complicated by recurrent large-volume ascites requiring paracenteses every 1 to 2 weeks who was admitted to [facility] on December 5, 2023 for evaluation of abdominal pain and distention.  Nephrology service is following for worsening of renal function.     Interval History :   Mrs Castano is doing quite well from renal standpoint, Cr down to 1.7 although still well above her baseline of 0.4.  Stable for discharge, trying to get by with a midline for abx until 12/25 rather than PICC, will schedule for CKD clinic non-urgently to follow long term as she is worked up for liver tx.  Has a good understanding of H2O and Na restrictions to reduce frequency of needed para's.      Assessment & Recommendations:   FELIX-Baseline Cr as low as 0.4 in 7/2023 but more recently has been ~0.8, had hyaline casts on UA consistent with prerenal or HRS.  Did receive toradol on 12/5 which has been stopped.  UPCR 0.2g/g, received 1g/g albumin x48h and Cr has plateaued at ~2.5.  Continuing supportive cares, there is some suspicion for infection with 1/2 cultures +.  Looking to do urine microscopy to see if there is evidence of ATN or if we should consider terlipressin for HRS.  BP's are higher than one would expect for HRS but did respond to midodrine relatively well.     -FELIX, hemodynamic with hyaline casts.  Likely hypovolemia with concurrent NSAID exposure on 12/5.  Improving.   -Continuing midodrine, with Cr stable/improving the past 4 days we are continuing supportive cares   -Hepatology working up for liver transplant.     Volume status-Minimal peripheral edema but + abd distention.      Electrolytes/pH-K 3.9, bicarb 19, Na 130    Hepatology-Has noticed ascites for the past ~2 months, Meld 3.0 ~35, getting workup for liver tx, sober since 7/2023 and there is  some ? Of other etiology than ETOH.      Ca/phos/pth-Ca 8.1, Mg 2.0, Phos 2.6 last check.    Anemia-Hgb 8.1 after 1u PRBC's yesterday, checked iron stores which were above detectable level.      Nutrition-Taking PO but supect poor intake with BUN of 9 at time of consult.      Time spent: 45 minutes on this date of encounter for chart review, physical exam, medical decision making and co-ordination of care.     Seen and discussed with Dr Downs    Recommendations were communicated to primary team via verbal communication.     LAZ Khanna CNS  Clinical Nurse Specialist  [phone]      Review of Systems:   I reviewed the following systems:  Gen: No fevers or chills  CV: No CP at rest  Resp: No SOB at rest  GI: No N/V    Physical Exam:   I/O last 3 completed shifts:  In: 945 [P.O.:550]  Out: 300 [Urine:300]   /72 (BP Location: Right arm, Patient Position: Supine, Cuff Size: Adult Regular)   Pulse 107   Temp 98.6  F (37  C) (Oral)   Resp 20   Wt 83.5 kg (184 lb)   SpO2 98%   BMI 33.12 kg/m       GENERAL APPEARANCE: + abd distension but in no distress  EYES: + scleral icterus, pupils equal  HENT: mouth without ulcers or lesions  PULM: lungs clear to auscultation, equal air movement, no cyanosis or clubbing  CV: regular rhythm, normal rate     -edema trace LE  GI: soft, non-tender, moderately distended  MS: no evidence of inflammation in joints, no muscle tenderness  NEURO: mentation intact and speech normal    Labs:   All labs reviewed by me  Electrolytes/Renal -   Recent Labs   Lab Test 12/14/23  0618 12/13/23  0559 12/12/23  0640 12/11/23  0634 12/10/23  0742 12/09/23  0719 12/08/23  1354 12/08/23  0719 12/06/23  1424 12/06/23  0709 08/08/23  1140 07/30/23  0639   * 129* 131* 131* 132* 131*  --  129*   < > 132*   < > 139   POTASSIUM 3.9 4.1 4.2 3.7 3.9 3.8   < > 3.4   < > 3.0*   < > 4.1   CHLORIDE 101 101 102 100 100 100  --  99   < > 101   < > 109*   CO2 19* 19* 20* 20* 22 22  --  21*   <  > 24   < > 25   BUN 16.8 14.4 11.8 9.1 8.6 8.3  --  8.3   < > 5.4*   < > 5.6*   CR 1.71* 2.14* 2.37* 2.50* 2.56* 2.46*  --  2.30*   < > 1.11*   < > 0.58   GLC 91 84 94 92 100* 87  --  95   < > 80   < > 101*   JULIAN 8.1* 8.0* 8.1* 8.5* 8.4* 8.1*  --  7.3*   < > 7.3*   < > 7.6*   MAG  --   --   --   --  2.0 2.0  --  2.2   < > 1.1*  --  1.8   PHOS  --   --   --  2.6  --   --   --   --   --  3.1  --  3.9    < > = values in this interval not displayed.       CBC -   Recent Labs   Lab Test 12/14/23  0618 12/13/23  0559 12/12/23  0915   WBC 13.6* 13.1* 14.0*   HGB 8.1* 6.9* 7.5*    144* 165       LFTs -   Recent Labs   Lab Test 12/14/23  0618 12/13/23  0559 12/12/23  0640   ALKPHOS 93 91 90   BILITOTAL 3.2* 3.3* 3.7*   ALT <5 <5 8   AST 42 47* 54*   PROTTOTAL 5.9* 5.7* 5.7*   ALBUMIN 3.0* 3.0* 3.0*       Iron Panel -   Recent Labs   Lab Test 12/11/23  0634 12/10/23  2118   IRON 61  --    ADDY  --  70           Current Medications:    ceFAZolin  2 g Intravenous Q8H     escitalopram  5 mg Oral Daily    Followed by     [START ON 12/25/2023] escitalopram  10 mg Oral Daily     folic acid  1 mg Oral Daily     sodium chloride (PF)  10 mL Intracatheter Q8H     sodium chloride (PF)  3 mL Intracatheter Q8H

## 2023-12-14 NOTE — PROCEDURES
[facility]  CAPS PROCEDURE NOTE  Date of Admission:  12/10/2023  Consult Requested by: Medicine  Reason for Consult: diagnostic evaluation of ascites and management of symptomatic ascites    Indication/HPI: Cirrhosis with ascites    Pre-Procedure Diagnosis: Ascites    Post-Procedure Diagnosis: Ascites    Risk Assessment: Average risk, Technically straightforward; patient's anticoagulation has been held according to guidelines based on the agent and platelets and coags are within guidelines    Procedure Outcome:  Diagnostic paracentesis performed and Therapeutic paracentesis performed with 4 liters of ascites removed. Albumin administered post procedure.   See additional procedure details below.    The primary covering service should follow up and address any lab results as appropriate.    Armando Villagomez MD  [facility]  Securely message with [employer]oc[employer] (more info)  Text page via [employer] Paging/Directory   See signed in provider for up to date coverage information      [facility]    Procedure: Abdominal paracentesis    Date/Time: 12/14/2023 11:55 AM    Performed by: Armando Villagomez MD  Authorized by: Armando Villagomez MD      UNIVERSAL PROTOCOL   Site Marked: Yes  Prior Images Obtained and Reviewed:  Yes  Required items: Required blood products, implants, devices and special equipment available    Patient identity confirmed:  Verbally with patient, hospital-assigned identification number and arm band  NA - No sedation, light sedation, or local anesthesia  Confirmation Checklist:  Patient's identity using two indicators  Time out: Immediately prior to the procedure a time out was called    Universal Protocol: the Joint Commission Universal Protocol was followed    Preparation: Patient was prepped and draped in usual sterile fashion       ANESTHESIA    Local Anesthetic:   Lidocaine 1% without epinephrine  Anesthetic Total (mL):  5      SEDATION    Patient Sedated: No      PROCEDURE    Length of time physician/provider present for 1:1 monitoring during sedation: 0      POC US GUIDE FOR PARACENTESIS     Impression  US Indication: decompensated liver disease    Limited abdominal ultrasound was performed and demonstrated an adequate fluid collection on the left side of the abdomen.    Doppler of the skin demonstrated an area at this site without significant vasculature.  A paracentesis at this site was subsequently performed.    Armando Villagomez MD

## 2023-12-14 NOTE — PROVIDER NOTIFICATION
12/14/23 0913   Midline Catheter Single Lumen Right Basilic   Placement Date/Time: 12/14/23 (c) 0913   Lumens (Required): Single Lumen  Catheter Brand: [employer]  Catheter Size: 3 fr  Description: Non - valved (open ended)  Lot #: [id number]  Full barrier precautions done: Yes, hand hygiene, sterile gown, sterile glove...   Site Assessment WDL   External Cath Length (cm) 0 cm   Initial Extremity Circumference (cm) 23 cm   Dressing Chlorhexidine disk;Transparent;Securement device   Dressing Status clean;dry;intact   Dressing Change Due 12/21/23   Status blood return noted;saline locked   Cap Change Due 12/18/23   Phlebitis Scale 0-->no symptoms   Infiltration? no

## 2023-12-14 NOTE — DISCHARGE SUMMARY
"[facility]  Hospitalist Discharge Summary      Date of Admission:  12/10/2023  Date of Discharge:  12/14/2023  Discharging Provider: Leonid Elliott DO  Discharge Service: Hospitalist Service, GOLD TEAM 8    Discharge Diagnoses     Decompensated Cirrhosis w/ ascites   FELIX likely secondary to ATN  MSSA positive blood culture 12/5   Hyponatremia  Anemia, chronic w/ acute drop, improved  Thrombocytopenia  Folate deficiency   RSV positive 12/5  Systolic murmur  Acquired hypothyroidism due to Hashimoto's thyroiditis  PTSD  Anxiety    Clinically Significant Risk Factors     # Obesity: Estimated body mass index is 33.12 kg/m  as calculated from the following:    Height as of 12/4/23: 1.588 m (5' 2.5\").    Weight as of this encounter: 83.5 kg (184 lb).       Follow-ups Needed After Discharge   Follow-up Appointments     Follow Up and recommended labs and tests      Please keep your follow up appointment with transplant hepatology     You have an appointment at [facility] at 11:30 AM and  2:30 PM on 12/20 for   a lab draw and dressing change on your midline. You also have an apt at [facility] on 12/26 at 11 AM to remove your midline            Unresulted Labs Ordered in the Past 30 Days of this Admission       Date and Time Order Name Status Description    12/14/2023 10:10 AM Differential Body Fluid In process     12/14/2023  9:19 AM Cell Count Body Fluid In process     12/13/2023  9:00 AM Ascites Fluid Aerobic Bacterial Culture Routine Without Gram Stain In process     12/13/2023  1:00 AM Copper level In process     12/12/2023  2:41 PM Gynecologic Cytology (PAP Smear) In process     12/12/2023  9:36 AM Copper urine In process     12/11/2023  3:23 PM Blood Culture Arm, Right Preliminary     12/11/2023  3:23 PM Blood Culture Hand, Right Preliminary     12/10/2023  8:53 PM Alpha 1 Antitrypsin In process         These results will be followed up by Hepatology, ID, Hospitalist pool "     Discharge Disposition   Discharged to home  Condition at discharge: Stable    Hospital Course     Priya Castano is a 28 year old female w/ cirrhosis (currently attributed to alcohol use) admitted on 12/10/2023. She is a transfer from [facility] for further workup for worsening kidney function ISO liver cirrhosis and planning for transplant workup. GI and nephrology consulted on admission, appreciate assistance. Patient was seen by nephrology and given improvement in renal function FELIX felt likely ATN due to torodol in conjunction with high volume para. As she had positive MSSA blood cultures at [facility], ID was consulted and it was decided to treat for uncomplicated MSSA bacteremia with two weeks of IV cefazolin. A midline was placed and patient was discharged to complete abx and follow up with hepatology.     Decompensated Cirrhosis w/ ascites   Follows w/ [facility] hepat[facility], Dr. Mendez. Currently undergoing liver transplant workup. Received paracentesis on 12/5, 11/27, 11/10. Outpatient, removing 5-6L,  followed by albumin and previously on spironolactone and furosemide PTA (stopped 12/6 2/2 concern for prerenal FELIX as below). No evidence of SBP on ascites fluid analysis, fluid cx NGTD (para 12/5). Treated w/ IV ceftriaxone (12/5- 12/8) and IV vancomycin (12/5- 12/6). Etiology of cirrhosis felt likely alcohol at this point. Patient found to have elevated PETH on admission and did endorse continued alcohol consumption. Thankfully with improvement in renal function transplant is not as imminent  - Hepatology consult placed, appreciate recs  - Daily MELD labs  - Daily CMP  - Additional cirrhosis workup negative  - Continue midodrine 2.5mg TID for now  - para 12/13 with 12.5 g albumin with each liter taken off      FELIX likely secondary to ATN  Seen by nephrology at [facility]-- suspected FELIX likely 2/2 high volume paracenteses and over-diuresis. However pt now s/p albumin challenge (6 50g 25% albumin  doses since 12/7 without improvement), PTA diuretics held, yet Cr continues to worsen. Urine sodium 20, not consistent w/ HRS. Now seems most likely ATN due to NSAID use plus paracentesis as Kidney function starting to improve.  - Nephrology consult placed, appreciate recs     MSSA positive blood culture 12/5   Noted at [facility] and felt to be contaminant as blood culture was drawn from old peripheral IV.  Repeat blood cultures have been negative and she has no systemic symptoms of infection.  ID was consulted who felt given the risk of infection with underlying cirrhosis will treat for uncomplicated MSSA bacteremia.  -2 weeks of IV cefazolin 2 g every 8 hours end 12/25      Hyponatremia  Low Na in setting of liver disease possibly made worse by hypovolemia     Anemia, chronic w/ acute drop, improved  Thrombocytopenia  Folate deficiency   On admission to [facility] 12/4, hgb 5.5, received 3U RBCs, hgb up to nearly 8, now hovering just above 7. Normocytic. Plt 105 on admission.  Workup notable for low folate, normal B12, and normal ferritin albeit in the setting of cirrhosis, peripheral smear without signs of hemolysis.   - Daily CBC to monitor hgb for now  - Transfuse < 7              - 1 unit pRBC 12/13  -Continue folic acid 1 mg daily     RSV positive 12/5  Admitted to [facility] 12/4/23 w/ c/f sepsis, RSV positive, also tx for presumed SBP (now ruled out). Presented w/ tachycardia (123), leukocytosis (16.2). Treated w/ IV ceftriaxone (12/5- 12/8) and IV vancomycin (12/5- 12/6).  Supportive cares for RSV. No documentation of need for supplemental O2.    - Supportive cares for RSV-- IS, guaifenesin prn  - Continue to monitor WBC     Systolic murmur  Appreciated on exam, Grade III.   - Echocardiogram     Acquired hypothyroidism due to Hashimoto's thyroiditis  Elevated TSH, normal free T4 12.5.  - Noted, no PTA meds at this time     PTSD  Anxiety  Has anxiety related to her liver disease. Was given ativan at [facility]  [place]. Discussed anxiety with patient who is agreeable to starting selective serotonin reuptake inhibitor and hydroxyzine while stopping ativan.   - Continue lexapro 5 mg x 2 weeks then 10 mg daily    Consultations This Hospital Stay   GI HEPATOLOGY ADULT IP CONSULT  NEPHROLOGY GENERAL ADULT IP CONSULT  PSYCHOLOGY ADULT IP CONSULT  ADDICTION SERVICE ADULT IP CONSULT FOR EAST BANK  INFECTIOUS DISEASE GENERAL ADULT IP CONSULT  CARE MANAGEMENT / SOCIAL WORK IP CONSULT  PSYCHIATRY IP CONSULT  PSYCHOLOGY ADULT IP CONSULT  OB GYN IP CONSULT  CARE MANAGEMENT / SOCIAL WORK IP CONSULT  VASCULAR ACCESS FOR PICC PLACEMENT ADULT IP CONSULT  INTERNAL MEDICINE PROCEDURE TEAM ADULT IP CONSULT EAST BANK - PARACENTESIS  NURSING TO CONSULT FOR VASCULAR ACCESS CARE IP CONSULT  VASCULAR ACCESS ADULT IP CONSULT    Code Status   Full Code    Time Spent on this Encounter   ILeonid DO, personally saw the patient today and spent greater than 30 minutes discharging this patient.       Leonid Elliott DO  [address] MED SURG  [facility] [address]  Phone: [phone]  ______________________________________________________________________    Physical Exam   Vital Signs: Temp: 98.6  F (37  C) Temp src: Oral BP: 118/72 Pulse: 107   Resp: 20 SpO2: 98 % O2 Device: None (Room air)    Weight: 184 lbs 0 oz    General Appearance:  In bed, no distress   Respiratory: breathing comfortably on room air   Cardiovascular: RRR  GI: distended non tender   Skin: no rashes or lesions   Other:  Non focal neuro exam         Primary Care Physician   Hannah Villalba    Discharge Orders      Basic metabolic panel     [facility]  Referral      Reason for your hospital stay    You were in the hospital due to worsening renal function. This was felt likely due to NSAID use in conjunction with your paracentesis and is now improving.     Activity    Your activity upon discharge: activity as tolerated     Follow Up and  recommended labs and tests    Please keep your follow up appointment with transplant hepatology     You have an appointment at [facility] at 11:30 AM and  2:30 PM on 12/20 for a lab draw and dressing change on your midline. You also have an apt at [facility] on 12/26 at 11 AM to remove your midline     Diet    Follow this diet upon discharge: Orders Placed This Encounter      Renal Diet (non-dialysis)     CBC with Platelets & Differential       Significant Results and Procedures   Most Recent 3 CBC's:  Recent Labs   Lab Test 12/14/23  0618 12/13/23  0559 12/12/23  0915   WBC 13.6* 13.1* 14.0*   HGB 8.1* 6.9* 7.5*   MCV 91 90 91    144* 165     Most Recent 3 BMP's:  Recent Labs   Lab Test 12/14/23  0618 12/13/23  0559 12/12/23  0640   * 129* 131*   POTASSIUM 3.9 4.1 4.2   CHLORIDE 101 101 102   CO2 19* 19* 20*   BUN 16.8 14.4 11.8   CR 1.71* 2.14* 2.37*   ANIONGAP 10 9 9   JULIAN 8.1* 8.0* 8.1*   GLC 91 84 94     Most Recent 2 LFT's:  Recent Labs   Lab Test 12/14/23  0618 12/13/23  0559   AST 42 47*   ALT <5 <5   ALKPHOS 93 91   BILITOTAL 3.2* 3.3*     Most Recent 3 INR's:  Recent Labs   Lab Test 12/14/23  0618 12/13/23  0559 12/12/23  0640   INR 2.11* 2.54* 2.29*   ,   Results for orders placed or performed during the hospital encounter of 12/10/23   US Abdomen Complete w Doppler Complete    Narrative    EXAMINATION: US ABDOMEN COMPLETE WITH DOPPLER COMPLETE 12/11/2023 9:23  AM     COMPARISON: Abdominal ultrasound 7/27/2023    HISTORY: Known cirrhosis, eval for portal hypertension    TECHNIQUE: The abdomen was scanned in standard fashion with  specialized ultrasound transducer(s) using both gray-scale, color  Doppler, and spectral flow techniques.    Findings:  Liver: The liver demonstrates diffusely increased echogenicity,  heterogenous echotexture with a nodular contour. The liver measures  20.8 cm. No evidence of a focal hepatic mass.     Recannulized periumbilical vein is visualized with a flow of 71  cm/s.    Extrahepatic portal vein flow is antegrade at 21 cm/s.  Right portal vein flow is retrograde, measuring 27 cm/s.  Left portal vein flow is antegrade, measuring 26 cm/s.    Flow in the hepatic artery is towards the liver and:  214 cm/s peak systolic  0.56 resistive index.     The splenic vein was not visualized on this exam.  The left, middle,  and right hepatic veins are patent with flow towards the IVC. The IVC  is patent with flow towards the heart.   The visualized aorta is not  dilated.    Gallbladder: Within the gallbladder lumen, there is echogenic  shadowing foci and echogenic sludge. There is no wall thickening or  positive sonographic Morrison's sign.    Bile Ducts: The intrahepatic biliary system is of normal caliber. The  common bile duct was not visualized on this exam.    Pancreas: The pancreas was not visualized in this exam.    Kidneys: Both kidneys are of normal echotexture, without mass or  hydronephrosis.   Renal lengths: right- 14 cm, left- 12.2 cm.    Spleen: The spleen measures 13.9 cm in length.    Fluid: Moderate volume ascites within the right upper quadrant.      Impression    Impression:   1.  Liver cirrhosis with findings of portal hypertension including  recanalized paraumbilical vein and retrograde flow within the right  portal vein, moderate volume ascites and mild splenomegaly.  2.  Cholelithiasis and biliary sludge without evidence of acute  cholecystitis.    I have personally reviewed the examination and initial interpretation  and I agree with the findings.    MANUEL BARR MD         SYSTEM ID:  [id number]   POC US Guide for Paracentesis    Impression    US Indication: decompensated liver disease    Limited abdominal ultrasound was performed and demonstrated an adequate fluid collection on the left side of the abdomen.     Doppler of the skin demonstrated an area at this site without significant vasculature.  A paracentesis at this site was subsequently performed.    Armando  Poncho Villagomez MD     Echo Complete     Value    LVEF  60-65%    Narrative    [id number]  [id number]  [id number]  [id number]^JAZZORI^RENE     [facility]  Echocardiography Laboratory  [address] [place] [id number]     Name: YOEL CAMPOS  MRN: [id number]  : 1995  Study Date: 2023 09:40 AM  Age: 28 yrs  Gender: Female  Patient Location: [facility]  Reason For Study: Murmur  Ordering Physician: RENE GARCIA  Referring Physician: LIBBY MARTINES  Performed By: Maria Luz Lopez     BSA: 1.8 m2  Height: 62 in  Weight: 181 lb  BP: 106/52 mmHg  ______________________________________________________________________________  Procedure  Complete Portable Echo Adult. Contrast Optison. Patient was given 5 ml mixture  of 3 ml Optison and 6 ml saline. 4 ml wasted.  ______________________________________________________________________________  Interpretation Summary  No significant valvular abnormalities present. Likely flow murmur related to  high cardiac output (estimated 10 L/min).  Global and regional left ventricular function is normal with an EF of 60-65%.  Global right ventricular function is normal.  No pericardial effusion is present.  There is no prior study for direct comparison.  ______________________________________________________________________________  Left Ventricle  Global and regional left ventricular function is normal with an EF of 60-65%.  Left ventricular wall thickness is normal. Left ventricular size is normal.  Left ventricular diastolic function is normal. There is no thrombus seen in  the left ventricle.     Right Ventricle  The right ventricle is normal size. Global right ventricular function is  normal.     Atria  The right atria appears normal. Mild left atrial enlargement is present.     Mitral Valve  The mitral valve is normal.     Aortic Valve  Aortic valve is normal in structure and function.     Tricuspid Valve  The tricuspid valve is  normal. The peak velocity of the tricuspid regurgitant  jet is not obtainable.     Pulmonic Valve  The pulmonic valve is normal.     Vessels  The thoracic aorta is normal. The inferior vena cava is normal.     Pericardium  No pericardial effusion is present.     Miscellaneous  No significant valvular abnormalities present. Ascites is noted.     Compared to Previous Study  There is no prior study for direct comparison.  ______________________________________________________________________________  MMode/2D Measurements & Calculations  IVSd: 1.1 cm  LVIDd: 4.8 cm  LVIDs: 2.6 cm  LVPWd: 1.0 cm  FS: 45.4 %  LV mass(C)d: 187.3 grams  LV mass(C)dI: 102.2 grams/m2  Ao root diam: 2.8 cm  asc Aorta Diam: 2.6 cm  LVOT diam: 2.1 cm  LVOT area: 3.5 cm2  Ao root diam index Ht(cm/m): 1.8  Ao root diam index BSA (cm/m2): 1.5  Asc Ao diam index BSA (cm/m2): 1.4  Asc Ao diam index Ht(cm/m): 1.6  LA Volume (BP): 70.2 ml     LA Volume Index (BP): 38.4 ml/m2  RWT: 0.43  TAPSE: 2.3 cm     Doppler Measurements & Calculations  Ao V2 max: 210.0 cm/sec  Ao max P.6 mmHg  Ao V2 mean: 146.0 cm/sec  Ao mean PG: 10.0 mmHg  Ao V2 VTI: 34.8 cm  ANNITA(I,D): 2.9 cm2  ANNITA(V,D): 2.9 cm2  LV V1 max P.0 mmHg  LV V1 max: 173.0 cm/sec  LV V1 VTI: 28.8 cm  SV(LVOT): 101.1 ml  SI(LVOT): 55.2 ml/m2  AV Randall Ratio (DI): 0.82  ANNITA Index (cm2/m2): 1.6  RV S Randall: 14.5 cm/sec     ______________________________________________________________________________  Report approved by: Radha Hernandez 2023 11:07 AM             Discharge Medications   Current Discharge Medication List        START taking these medications    Details   ceFAZolin (ANCEF) intermittent infusion 2 g in 100 mL dextrose PRE-MIX Inject 100 mLs (2 g) into the vein every 8 hours for 11 days  Qty: 3300 mL, Refills: 0    Associated Diagnoses: MSSA bacteremia      escitalopram (LEXAPRO) 10 MG tablet Take 0.5 tablets (5 mg) by mouth daily for 10 days, THEN 1 tablet (10 mg)  daily.  Qty: 365 tablet, Refills: 0    Associated Diagnoses: Adjustment disorder with anxious mood      folic acid (FOLVITE) 1 MG tablet Take 1 tablet (1 mg) by mouth daily  Qty: 90 tablet, Refills: 3    Associated Diagnoses: Anemia, unspecified type           CONTINUE these medications which have NOT CHANGED    Details   lactulose (CHRONULAC) 10 GM/15ML solution Take 10 g by mouth daily      midodrine (PROAMATINE) 2.5 MG tablet Take 2.5 mg by mouth 3 times daily Hold for BP > 115      oxyCODONE (ROXICODONE) 5 MG tablet Take 5 mg by mouth every 4 hours as needed for severe pain           STOP taking these medications       furosemide (LASIX) 20 MG tablet Comments:   Reason for Stopping:         spironolactone (ALDACTONE) 100 MG tablet Comments:   Reason for Stopping:             Allergies   Allergies   Allergen Reactions    Bee Venom Hives and Shortness Of Breath

## 2023-12-14 NOTE — PROGRESS NOTES
"[facility]     Addiction Progress Note - Addiction Service        Date of Admission:  12/10/2023  Assessment & Plan       Priya Castano is a 28F with a history of alcohol use disorder (in remission) and cirrhosis (presumed EtOH related) who was transferred to [facility] on 12/10 from [facility] for decompensated liver disease and acute kidney injury. She was transferred primarily for specialty evaluation and ongoing workup related to transplant.      # Alcohol Use Disorder  # Decompensated Liver Disease  # Acute Kidney Injury  Priya has a past history of heavy alcohol use, though denies any intake of alcohol since 7/27/23.   Triggers include boredom, stress, mental health triggers, among others.  She went through a 30 day program at [facility] in [place], [place] at that time, and then enrolled in their evening program for 2 days weekly with transition to weekly visits. She says that she still continues to work with this program. She now has methods to help distract herself from cravings (calling friends). Feels she has good social support at this time for continued abstinence from alcohol; however, she did have a relapse recently.  Hence, she is interested in medication assistance as well.  Naltrexone may be a good therapeutic option for the patient once determined that her liver disease is stable.  Since she is discharging today, Priya thinks she can meet her goal for now, and is okay having naltrexone being reassessed as an outpatient.  Will communicate this recommendation to hepatology.  - she would also benefit from linkage to outpatient addiction medicine, see \"linkage to care,\" below     # Mental health: History of PTSD and Anxiety  -Agree with use of selective serotonin reuptake inhibitor and prn hydroxine     # Harm Reduction:  -hepatitis C serologies non-reactive 7/27/23  -HIV nonreactive 7/29/23      # Immunization review:   -Hep A: has completed primary 2-dose " "series in 2012 per [facility]  -Hep B: has completed vaccine series per [facility]     # Peer Support:   -Our peer  will meet the patient if agreeable and still hospitalized on Thursday, to provide additional outpatient resources  -To contact Yolette Peer  from [facility]): call or text: [phone]     # Addiction Social Worker:   Our addiction social worker Anirudh Stanford can be contacted if needed, on her pager [phone] or texted/called at [phone]     # Linkage to Care:   -Connected with  hepatology and undergoing transplant workup. PCP recently established at [facility], last seen in 8/2023.  -Currently connected with outpatient program at [facility] in [place].  Will discuss linkage to outpatient addiction medicine for long term assistance meeting goals       Rolo De Dios MD on 12/14/2023 at 9:02 AM   Addiction Service   [facility]     Contact information available via [facility] Paging/Directory under \"addiction medicine\"        ______________________________________________________________________    Interval History Discharging today       ROS:  CV, Pulm, GI and  assessed. Pertinent positives as above, otherwise negative.     Data reviewed today: I reviewed all medications, new labs and imaging results over the last 24 hours.     Physical Exam   Temp: 98.6  F (37  C) Temp src: Oral BP: 118/72 Pulse: 107   Resp: 20 SpO2: 98 % O2 Device: None (Room air)    Gen: NAD  HEENT: EOMI, PERRL, MMM  CV: extremities warm and well perfused  Resp: breathing comfortably on RA  : deferred  Msk: no LE edema  Skin: no rashes  Neuro: nonfocal exam      Due to regulation of Title 42 of the Code of Federal Regulations (CFR) Part 2: Confidentiality laws apply to this note and the information wherein.  Thus, this note cannot be copy and pasted into any other health care staff's note nor can it be included in general medical records sent to ANY " outside agency without the patient's written consent.

## 2023-12-14 NOTE — PLAN OF CARE
Patient discharged from unit to home in stable condition at 1500 via WC and escorted by 7C NST. Mid line in place, flushed and patent. Discharge instructions given including new med and future MD appointments. Patient verbalized understanding of discharge instructions and had no questions. Personal belongings taken by patient.

## 2023-12-14 NOTE — PROCEDURES
[facility]    Single Lumen Midline Placement    Date/Time: 12/14/2023 9:13 AM    Performed by: Von Agrawal RN  Authorized by: Leonid Elliott DO  Indications: vascular access      UNIVERSAL PROTOCOL   Site Marked: Yes  Prior Images Obtained and Reviewed:  Yes  Required items: Required blood products, implants, devices and special equipment available    Patient identity confirmed:  Verbally with patient, hospital-assigned identification number, arm band and provided demographic data  Patient was reevaluated immediately before administering moderate or deep sedation or anesthesia  Confirmation Checklist:  Patient's identity using two indicators, procedure was appropriate and matched the consent or emergent situation, correct equipment/implants were available and relevant allergies  Time out: Immediately prior to the procedure a time out was called    Universal Protocol: the Joint Commission Universal Protocol was followed    Preparation: Patient was prepped and draped in usual sterile fashion       ANESTHESIA    Anesthesia:  See MAR for details  Local Anesthetic:  Lidocaine 1% without epinephrine  Anesthetic Total (mL):  1      SEDATION    Patient Sedated: No        Preparation: skin prepped with ChloraPrep  Skin prep agent: skin prep agent completely dried prior to procedure  Sterile barriers: maximum sterile barriers were used: cap, mask, sterile gown, sterile gloves, and large sterile sheet  Hand hygiene: hand hygiene performed prior to central venous catheter insertion  Type of line used: Midline (Power injectable)  Catheter type: single lumen  Lumen type: non-valved  Catheter size: 3 Fr  Brand: Bard  Lot number: [id number]  Placement method: venipuncture, MST and ultrasound  Number of attempts: 1  Difficulty threading catheter: no  Successful placement: yes  Orientation: right    Location: basilic vein (vein diameter - 0.30 cm)  Tip Location: distal to axillary  vein  Site rationale: Left upper arm birth control device  Arm circumference: adults 10 cm  Extremity circumference: 23  Visible catheter length: 0  Total catheter length: 20  Dressing and securement: alcohol impregnated caps, chlorhexidine disc applied, transparent dressing, tissue adhesive, sterile dressing applied, statlock and site cleansed  Post procedure assessment: blood return through all ports and free fluid flow  PROCEDURE   Patient Tolerance:  Patient tolerated the procedure well with no immediate complicationsDescribe Procedure: Midline IV catheter is OK to use for NON irritant/vesicant IV medications. NOT recommended for lab draws & infusion of incompatible medications.

## 2023-12-14 NOTE — PROGRESS NOTES
[facility]  Parenteral ANtibiotic Review at Departure from Acute Care Collaborative Note     Patient: Priya Castano  MRN: [id number]  Allergies: Bee venom    Current Location: [facility]  OPAT to be provided by: [facility]       Line Type: Midline    Diagnosis/Indications: MSSA uncomplicated bacteremia  Organism(s): MSSA  MRDO? No  Pending Cultures/Microbiological Tests: yes Repeat blood cultures pending finalization    Inpatient ID involved in developing OPAT plan: Yes - discharge OPAT plan has no changes from ID provider, Dr. Pina Gomes (PA-C), OPAT plan charted on 12/13/2023  - midline placed instead of PICC, reasonable in setting of short duration    Outpatient ID Follow-up: ID OPAT Clinic Referral Placed ([facility] [place] [facility] Ph: [phone] and Fax: [phone]) - no appointment needed  Designated Provider: Dr. Jasmyne Diaz    Antimicrobial Regimen / Route Anticipated  Duration Start Date Stop /  Reassess Date   Cefazolin 2 g every 8 hours/IV 14 days 12/11/2023 12/25/2023     Laboratory Tests and Monitoring Frequency: CBC with Diff, BMP Once Weekly    Imaging/Miscellaneous Monitoring: None    ID Pharmacist Interventions: None                          Nhi Junior, PharmD  PGY2 ID Infectious Diseases Pharmacy Resident  Pager: [phone]

## 2023-12-14 NOTE — PLAN OF CARE
Goal Outcome Evaluation:      Plan of Care Reviewed With: patient    Overall Patient Progress: improvingOverall Patient Progress: improving    Outcome Evaluation: Medically ready and discharging home with self home IVAB administration.    Maren Horton RNCC

## 2023-12-14 NOTE — PROGRESS NOTES
Care Management Discharge Note    Discharge Date: 12/14/2023     Discharge Disposition: Home    Discharge Services: [facility]    Discharge DME: None    Discharge Transportation: parents will provide    Private pay costs discussed: Not applicable    Does the patient's insurance plan have a 3 day qualifying hospital stay waiver?  No    PAS Confirmation Code: No  Patient/family educated on Medicare website which has current facility and service quality ratings: no    Education Provided on the Discharge Plan: Yes  Persons Notified of Discharge Plans: pt  Patient/Family in Agreement with the Plan: yes    Handoff Referral Completed: Yes    Additional Information:    Patient's status reviewed by chart. Pt is ready to go home today. Midline placement completed without complications. Sisi from [facility] will provide home infusion with IV cefazolin between 12: today. Bedside RN will administer the next dose of cefazolin around 1300. Pt will be discharge after all the discharge teachings completed. The discharge order is in.     Writer communicated with the pt and put the appointments in the discharge instruction for lab on 12/20 and midline dressing change and removal on 12/20 and 12/26. Pt acknowledged and can self drive to these appointments.    No further discharge needs.    [facility] - $24.17/day IVAB until 12/25  [email or web address] [email or web address]  P: [phone]    Maren Horton RN  7C RN Care Coordinator  Phone: [phone]  Pager: [phone]  [facility] ([phone]) Saturday & Sunday; ([phone]) [facility] Recognized Holidays   Units: 5A, 5B & 5C  Pager: [phone]  Units: 6B, 6C & 6D    Pager: [phone]  Units: 7A, 7B, 7C & 7D    Pager: [phone]  Units: 6A & ICU   Pager: [phone]  Units: 5 Ortho, 5MS & WB ED Pager: [phone]  Units: 6MS, 8A & 10 ICU  Pager [phone]

## 2023-12-15 ENCOUNTER — PATIENT OUTREACH (OUTPATIENT)
Dept: CARE COORDINATION | Facility: CLINIC | Age: 28
End: 2023-12-15

## 2023-12-15 NOTE — PROGRESS NOTES
Clinic Care Coordination Contact  [facility]: Post-Discharge Note  SITUATION                                                      Admission:    Admission Date: 12/04/23   Reason for Admission: Abdominal distention  Discharge:   Discharge Date: 12/14/23  Discharge Diagnosis: Decompensated Cirrhosis w/ ascites   FELIX likely secondary to ATN  MSSA positive blood culture 12/5   Hyponatremia  Anemia, chronic w/ acute drop, improved  Thrombocytopenia  Folate deficiency   RSV positive 12/5  Systolic murmur  Acquired hypothyroidism due to Hashimoto's thyroiditis  PTSD  Anxiety    BACKGROUND                                                      Per hospital discharge summary and inpatient provider notes:  Priya Castano is a 28 year old female w/ cirrhosis (currently attributed to alcohol use) admitted on 12/10/2023. She is a transfer from [facility] for further workup for worsening kidney function ISO liver cirrhosis and planning for transplant workup. GI and nephrology consulted on admission, appreciate assistance. Patient was seen by nephrology and given improvement in renal function FELIX felt likely ATN due to torodol in conjunction with high volume para. As she had positive MSSA blood cultures at [place], ID was consulted and it was decided to treat for uncomplicated MSSA bacteremia with two weeks of IV cefazolin. A midline was placed and patient was discharged to complete abx and follow up with hepatology.     Decompensated Cirrhosis w/ ascites   [facility] Dr. Mendez. Currently undergoing liver transplant workup. Received paracentesis on 12/5, 11/27, 11/10. Outpatient, removing 5-6L,  followed by albumin and previously on spironolactone and furosemide PTA (stopped 12/6 2/2 concern for prerenal FELIX as below). No evidence of SBP on ascites fluid analysis, fluid cx NGTD (para 12/5). Treated w/ IV ceftriaxone (12/5- 12/8) and IV vancomycin (12/5- 12/6). Etiology of cirrhosis felt likely  alcohol at this point. Patient found to have elevated PETH on admission and did endorse continued alcohol consumption. Thankfully with improvement in renal function transplant is not as imminent  - Hepatology consult placed, appreciate recs  - Daily MELD labs  - Daily CMP  - Additional cirrhosis workup negative  - Continue midodrine 2.5mg TID for now  - para 12/13 with 12.5 g albumin with each liter taken off      FELIX likely secondary to ATN  Seen by nephrology at [facility]-- suspected FELIX likely 2/2 high volume paracenteses and over-diuresis. However pt now s/p albumin challenge (6 50g 25% albumin doses since 12/7 without improvement), PTA diuretics held, yet Cr continues to worsen. Urine sodium 20, not consistent w/ HRS. Now seems most likely ATN due to NSAID use plus paracentesis as Kidney function starting to improve.  - Nephrology consult placed, appreciate recs     MSSA positive blood culture 12/5   Noted at [facility] and felt to be contaminant as blood culture was drawn from old peripheral IV.  Repeat blood cultures have been negative and she has no systemic symptoms of infection.  ID was consulted who felt given the risk of infection with underlying cirrhosis will treat for uncomplicated MSSA bacteremia.  -2 weeks of IV cefazolin 2 g every 8 hours end 12/25      Hyponatremia  Low Na in setting of liver disease possibly made worse by hypovolemia     Anemia, chronic w/ acute drop, improved  Thrombocytopenia  Folate deficiency   On admission to [facility] 12/4, hgb 5.5, received 3U RBCs, hgb up to nearly 8, now hovering just above 7. Normocytic. Plt 105 on admission.  Workup notable for low folate, normal B12, and normal ferritin albeit in the setting of cirrhosis, peripheral smear without signs of hemolysis.   - Daily CBC to monitor hgb for now  - Transfuse < 7              - 1 unit pRBC 12/13  -Continue folic acid 1 mg daily     RSV positive 12/5  Admitted to [facility] 12/4/23 w/ c/f sepsis, RSV positive,  also tx for presumed SBP (now ruled out). Presented w/ tachycardia (123), leukocytosis (16.2). Treated w/ IV ceftriaxone (12/5- 12/8) and IV vancomycin (12/5- 12/6).  Supportive cares for RSV. No documentation of need for supplemental O2.    - Supportive cares for RSV-- IS, guaifenesin prn  - Continue to monitor WBC     Systolic murmur  Appreciated on exam, Grade III.   - Echocardiogram     Acquired hypothyroidism due to Hashimoto's thyroiditis  Elevated TSH, normal free T4 12.5.  - Noted, no PTA meds at this time     PTSD  Anxiety  Has anxiety related to her liver disease. Was given ativan at [place]. Discussed anxiety with patient who is agreeable to starting selective serotonin reuptake inhibitor and hydroxyzine while stopping ativan.   - Continue lexapro 5 mg x 2 weeks then 10 mg daily    ASSESSMENT           Discharge Assessment  How are you doing now that you are home?: Spoke with Priya.  She stated she was very happy to be home.  Stating she was still feeling tired but doing well.  She was able to complete 2 IV antibx today.  Denies any additional questions  How are your symptoms? (Red Flag symptoms escalate to triage hotline per guidelines): Improved  Do you feel your condition is stable enough to be safe at home until your provider visit?: Yes  Does the patient have their discharge instructions? : Yes  Does the patient have questions regarding their discharge instructions? : No  Were you started on any new medications or were there changes to any of your previous medications? : Yes  Do you have questions regarding any of your medications? : No  Discharge follow-up appointment scheduled within 14 calendar days? : Yes  Discharge Follow Up Appointment Date: 12/26/23  Discharge Follow Up Appointment Scheduled with?: Specialty Care Provider    Post-op ([facility] CTA Only)  If the patient had a surgery or procedure, do they have any questions for a nurse?: No    Post-op (Clinicians Only)  Did the patient have surgery  or a procedure: Yes  Incision: healing  Drainage: No  Fever: No  Chills: No  Redness: No  Warmth: No  Swelling: No  Incision site pain: No  Eating & Drinking: eating and drinking without complaints/concerns  PO Intake: other (renal)  Bowel Function: normal  Urinary Status: voiding without complaint/concerns    Spoke with Priya.  See note above.  Patient has both a transplant coordinator and a Hepatology coordinator.  No PCP follow up recommended.  She has an EGD scheduled, IV antibx (denies any questions).  She appreciated the call and denied and current needs.    PLAN                                                      Outpatient Plan:  Patient to attend the appts as recommended below.  Patient to continue to have outreach from specialty care coordinators and specialists for ongoing care.    Future Appointments   Date Time Provider Department Center   12/20/2023 11:15 AM [facility] LAB [facility]   12/20/2023  2:30 PM [facility]ER INFUSION NURSE SHARMAINE ELIAS   12/26/2023 11:00 AM [facility] INFUSION NURSE SHARMAINE ELIAS   1/2/2024 10:00 AM [id number] UCCUS [facility]   5/29/2024  4:00 PM  LAB UCLABR [facility]   5/29/2024  4:30 PM Luci Mendez MD [facility]         For any urgent concerns, please contact our 24 hour nurse triage line: [phone] ([phone])         Marlena Allison RN

## 2023-12-16 LAB
BACTERIA BLD CULT: NO GROWTH
BACTERIA BLD CULT: NO GROWTH
COPPER 24H UR-MRATE: 12.9 UG/D
COPPER UR-MCNC: 3.4 UG/DL
COPPER/CREAT UR: 22.2 UG/G CRT
CREAT 24H UR-MRATE: 581 MG/D
CREAT UR-MCNC: 153 MG/DL

## 2023-12-19 ENCOUNTER — COMMITTEE REVIEW (OUTPATIENT)
Dept: TRANSPLANT | Facility: CLINIC | Age: 28
End: 2023-12-19

## 2023-12-19 LAB — BACTERIA FLD CULT: NO GROWTH

## 2023-12-19 NOTE — COMMITTEE REVIEW
Abdominal Committee Review Note     Evaluation Date:   Committee Review Date: 12/19/2023    Organ being evaluated for: Liver    Transplant Phase: Referral  Transplant Status: Active    Transplant Coordinator: Ron Hirsch Jr.  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  Secondary Diagnosis:     Committee Review Members:  Nutrition Rosie King, RD   Pharmacist Sunshine Christy, [facility]    - Clinical Liza Smith, MSW, Stella Simpson, [facility], Sandeep Krishnan, [facility]   Transplant Mel Dwyer, RN, Angeles Mayer LPN, Kiana Casanova, RN, Ly Martinez, JANICE, Jr Ron Hirsch, JANICE, Ya Mix, APRN CNP, Carlos Alberto Pepe, RN, Nadja Hopson RN   Transplant Hepatology  Betsy Robertson MD, Armando Morris MD, Marcia Davis MD, Luci Mendez MD, Josemanuel Young MD   Transplant Surgery Dharmesh Norton MD, LAZ Al CNP, Jennifer Warner NP, Moraima Diaz NP, Oren Mackey MD, Aaron Choi MD, Kirk Harrell MD, Socrates Lieberman MD       Transplant Eligibility: Cirrhosis with MELD, ETOH, Acute Liver Failure    Committee Review Decision: Declined    Relative Contraindications: Other, see below    Absolute Contraindications: Active alcoholism    Committee Chair Josemanuel Benton MD verbally attested to the committee's decision.    Committee Discussion Details:     Close due to recent alcohol use against medical advice    Will continue to follow in liver clinic, complete TURNER programming, and re-evaluation if necessary

## 2023-12-19 NOTE — LETTER
December 20, 2023    Priya Castano  [address] [address]    Dear Ms. Castano,   The purpose of this letter is to let you know that on  the [facility] Multi-Disciplinary Selection Team reviewed the results of your transplant evaluation. Based on the results of your evaluation and the selection criteria used by our program, the decision was made to not list you on the liver transplant list. This is because of your recent alcohol use against medical advice.   Important things you should know:  We recommend that you engage in a substance use program and continue to follow with our liver clinic in order to re-refer you for transplant if/when it is appropriate.   If you would like to discuss the decision, or if your medical status changes you may schedule a return visits with your doctor by calling [phone] and asking to speak to your transplant coordinator.  We recommend that you continue to follow up with your primary care doctor in order to manage your health concerns.  We want you to know that our program has physician and surgeon coverage 24 hours a day, 365 days a year.   Attached is a letter from [facility] that describes the services and information offered to patients by [facility] and the Organ Procurement and Transplantation Network (OPTN).  Thank you for allowing us to participate in your care.  We wish you well.  Sincerely,    Ron Hirsch Jr., BSN, RN  Liver Transplant Coordinator  [phone]    Enclosures: [facility] Letter  cc: Care Team    The Organ Procurement and Transplantation Network Toll-free patient services line:  [phone]  Your resource for organ transplant information    Staffed 8:30 am - 5:00 pm ET Monday - Friday Leave a message 24/7 to receive a call back    The Organ Procurement and Transplantation Network (OPTN) is the national transplant system. It makes the policies that decide how donated organs are matched to patients waiting  for a transplant. The OPTN:    Makes sure donated organs get matched to people on the transplant waiting list  Tells people about the donation and transplant processes  Makes sure that the public knows about the need for more organ and tissue donations    The OPTN has a free patient services line that you can call to:  Get more information about:  Organ donation and organ transplants  Donation and transplant policies  Get an information kit with:  A list of transplant hospitals  Waiting list information  Talk about any questions you may have about your transplant hospital or organ procurement organization. The staff will do their best to help you or point you to others who may help.  Find out how you can volunteer with the OPTN and help shape transplant policy     The patient services line number is: 5-741-082-5519    Patient services line staff CANNOT answer questions about your own medical care, including:  Waiting list status  Test results  Medical records  You will need to call your transplant hospital for this information.    The following websites have more information about transplantation and donation:    OPTN: https://optn.transplant.hrsa.gov/    For potential living donors and transplant recipients:    Living with transplant: https://www.transplantliving.org/    Living donation process: https://optn.transplant.hrsa.gov/living-donation/    Financial assistance: https://www.livingdonorassistance.org/    Transplantation data: https://www.srtr.org/    Organ donation: https://www.organdonor.gov/    Volunteer with the OPTN: https://optn.transplant.hrsa.gov/get-involved/

## 2023-12-20 ENCOUNTER — INFUSION THERAPY VISIT (OUTPATIENT)
Dept: INFUSION THERAPY | Facility: CLINIC | Age: 28
End: 2023-12-20
Attending: FAMILY MEDICINE

## 2023-12-20 ENCOUNTER — TELEPHONE (OUTPATIENT)
Dept: GASTROENTEROLOGY | Facility: CLINIC | Age: 28
End: 2023-12-20

## 2023-12-20 ENCOUNTER — PATIENT OUTREACH (OUTPATIENT)
Dept: GASTROENTEROLOGY | Facility: CLINIC | Age: 28
End: 2023-12-20

## 2023-12-20 ENCOUNTER — LAB (OUTPATIENT)
Dept: LAB | Facility: CLINIC | Age: 28
End: 2023-12-20

## 2023-12-20 DIAGNOSIS — B95.61 MSSA BACTEREMIA: ICD-10-CM

## 2023-12-20 DIAGNOSIS — R78.81 MSSA BACTEREMIA: ICD-10-CM

## 2023-12-20 DIAGNOSIS — K70.11 ALCOHOLIC HEPATITIS WITH ASCITES (H): ICD-10-CM

## 2023-12-20 DIAGNOSIS — R65.10 SIRS (SYSTEMIC INFLAMMATORY RESPONSE SYNDROME) (H): Primary | ICD-10-CM

## 2023-12-20 DIAGNOSIS — K70.11 ALCOHOLIC HEPATITIS WITH ASCITES (H): Primary | ICD-10-CM

## 2023-12-20 LAB
ANION GAP SERPL CALCULATED.3IONS-SCNC: 9 MMOL/L (ref 7–15)
BASOPHILS # BLD AUTO: 0.1 10E3/UL (ref 0–0.2)
BASOPHILS NFR BLD AUTO: 1 %
BUN SERPL-MCNC: 33 MG/DL (ref 6–20)
CALCIUM SERPL-MCNC: 8.3 MG/DL (ref 8.6–10)
CHLORIDE SERPL-SCNC: 104 MMOL/L (ref 98–107)
CREAT SERPL-MCNC: 1.23 MG/DL (ref 0.51–0.95)
DEPRECATED HCO3 PLAS-SCNC: 19 MMOL/L (ref 22–29)
EGFRCR SERPLBLD CKD-EPI 2021: 61 ML/MIN/1.73M2
EOSINOPHIL # BLD AUTO: 0.3 10E3/UL (ref 0–0.7)
EOSINOPHIL NFR BLD AUTO: 2 %
ERYTHROCYTE [DISTWIDTH] IN BLOOD BY AUTOMATED COUNT: 21.7 % (ref 10–15)
GLUCOSE SERPL-MCNC: 99 MG/DL (ref 70–99)
HCT VFR BLD AUTO: 25.4 % (ref 35–47)
HGB BLD-MCNC: 8 G/DL (ref 11.7–15.7)
IMM GRANULOCYTES # BLD: 0.1 10E3/UL
IMM GRANULOCYTES NFR BLD: 1 %
LYMPHOCYTES # BLD AUTO: 1.6 10E3/UL (ref 0.8–5.3)
LYMPHOCYTES NFR BLD AUTO: 12 %
MCH RBC QN AUTO: 28.9 PG (ref 26.5–33)
MCHC RBC AUTO-ENTMCNC: 31.5 G/DL (ref 31.5–36.5)
MCV RBC AUTO: 92 FL (ref 78–100)
MONOCYTES # BLD AUTO: 1 10E3/UL (ref 0–1.3)
MONOCYTES NFR BLD AUTO: 7 %
NEUTROPHILS # BLD AUTO: 10.8 10E3/UL (ref 1.6–8.3)
NEUTROPHILS NFR BLD AUTO: 77 %
PLATELET # BLD AUTO: 176 10E3/UL (ref 150–450)
POTASSIUM SERPL-SCNC: 3.6 MMOL/L (ref 3.4–5.3)
RBC # BLD AUTO: 2.77 10E6/UL (ref 3.8–5.2)
SODIUM SERPL-SCNC: 132 MMOL/L (ref 135–145)
WBC # BLD AUTO: 14 10E3/UL (ref 4–11)

## 2023-12-20 PROCEDURE — 85025 COMPLETE CBC W/AUTO DIFF WBC: CPT | Mod: 90

## 2023-12-20 PROCEDURE — G0480 DRUG TEST DEF 1-7 CLASSES: HCPCS | Mod: 90

## 2023-12-20 PROCEDURE — 80048 BASIC METABOLIC PNL TOTAL CA: CPT

## 2023-12-20 PROCEDURE — 36415 COLL VENOUS BLD VENIPUNCTURE: CPT

## 2023-12-20 NOTE — TELEPHONE ENCOUNTER
"Endoscopy Scheduling Screen    Waiting on approval to be scheduled with different provider. Needs hospital setting per RN review    Have you had a positive Covid test in the last 14 days?  No    Are you active on MyChart?   Yes    What insurance is in the chart?  Other:  01/2024 BCBS    Ordering/Referring Provider: SUJATHA MARAVILLA    (If ordering provider performs procedure, schedule with ordering provider unless otherwise instructed. )    BMI: Estimated body mass index is 33.12 kg/m  as calculated from the following:    Height as of 12/4/23: 1.588 m (5' 2.5\").    Weight as of 12/13/23: 83.5 kg (184 lb).     Sedation Ordered  MAC/deep sedation.   BMI<= 45 45 < BMI <= 48 48 < BMI < = 50  BMI > 50   No Restrictions No [facility]  No [facility]  [facility] with exceptions Hospital Only OR Only       Are you taking any prescription medications for pain 3 or more times per week?   NO - No RN review required.    Do you have a history of malignant hyperthermia or adverse reaction to anesthesia?  No    (Females) Are you currently pregnant?   No     Have you been diagnosed or told you have pulmonary hypertension?   No    Do you have an LVAD?  No    Have you been told you have moderate to severe sleep apnea?  No    Have you been told you have COPD, asthma, or any other lung disease?  No    Do you have any heart conditions?  No     Have you ever had an organ transplant?   No    Have you ever had or are you awaiting a heart or lung transplant?   No    Have you had a stroke or transient ischemic attack (TIA aka \"mini stroke\" in the last 6 months?   No    Have you been diagnosed with or been told you have cirrhosis of the liver?   Yes (RN Review required for scheduling unless scheduling in Hospital.)    Are you currently on dialysis?   No    Do you need assistance transferring?   No    BMI: Estimated body mass index is 33.12 kg/m  as calculated from the following:    Height as of 12/4/23: 1.588 m (5' 2.5\").    Weight as of 12/13/23: 83.5 " kg (184 lb).     Is patients BMI > 40 and scheduling location [facility]?  No    Do you take an injectable medication for weight loss or diabetes (excluding insulin)?  No    Do you take the medication Naltrexone?  No    Do you take blood thinners?  No       Prep   Are you currently on dialysis or do you have chronic kidney disease?  No    Do you have a diagnosis of diabetes?  No    Do you have a diagnosis of cystic fibrosis (CF)?  No    On a regular basis do you go 3 -5 days between bowel movements?  No    BMI > 40?  No    Preferred Pharmacy:    [id number] - [place], [place] - [address]   [address]  [place] [address]  Phone: [phone] Fax: [phone]      Final Scheduling Details   Colonoscopy prep sent?  N/A    Procedure scheduled  Upper endoscopy (EGD)

## 2023-12-20 NOTE — TELEPHONE ENCOUNTER
Pre Assessment RN Review    Focused Assessments      Cirrhosis Assessment    Results in Past 90 Days  Result Component Current Result Ref Range Previous Result Ref Range   Hemoglobin 8.0 (L) (12/20/2023) 11.7 - 15.7 g/dL 8.1 (L) (12/14/2023) 11.7 - 15.7 g/dL   INR 2.11 (H) (12/14/2023) 0.85 - 1.15 2.54 (H) (12/13/2023) 0.85 - 1.15   Platelet Count 176 (12/20/2023) 150 - 450 10e3/uL 155 (12/14/2023) 150 - 450 10e3/uL       MELD 3.0: 30 at 12/14/2023  6:18 AM  MELD-Na: 28 at 12/14/2023  6:18 AM  Calculated from:  Serum Creatinine: 1.71 mg/dL at 12/14/2023  6:18 AM  Serum Sodium: 130 mmol/L at 12/14/2023  6:18 AM  Total Bilirubin: 3.2 mg/dL at 12/14/2023  6:18 AM  Serum Albumin: 3.0 g/dL at 12/14/2023  6:18 AM  INR(ratio): 2.11 at 12/14/2023  6:18 AM  Age at listing (hypothetical): 28 years  Sex: Female at 12/14/2023  6:18 AM      INR <= 2.0*  and  Hgb >= 9 g/dL  and  Plt >= 50 10^9/L INR > 2.0   OR  Hgb < 9 g/dL   OR  Plt < 50 10^9/L   No Scheduling Restrictions Hospital Only   *Exception for patients on anticoagulation therapy.    Hx of variceal bleeding? No. (no scheduling restrictions)    Paracentesis in the last month? Yes (Hospital Only)      Scheduling Status & Recommendations    Location Type: Hospital - Per exclusion criteria.

## 2023-12-20 NOTE — PROGRESS NOTES
Attempted to reach patient for check in, no answer, message left requesting call back, number provided.    Nadja Felder, RN Care Coordinator  [facility]  Hepatology Clinic/Specialty Program

## 2023-12-21 ENCOUNTER — TELEPHONE (OUTPATIENT)
Dept: NEPHROLOGY | Facility: CLINIC | Age: 28
End: 2023-12-21

## 2023-12-21 NOTE — TELEPHONE ENCOUNTER
1ST ATT LVM     Patient is approved to see any Hepatologist. Needs [facility] setting.

## 2023-12-21 NOTE — TELEPHONE ENCOUNTER
JOEY and sent My Chart message to schedule new Nephrology appointment for CKD with labs prior with Dr. Mccall at the end of January // first attempt 12.21.2023 MCE

## 2023-12-22 LAB
LABORATORY REPORT: NORMAL
PETH INTERPRETATION: NORMAL
PLPETH BLD-MCNC: 48 NG/ML
POPETH BLD-MCNC: 87 NG/ML

## 2023-12-26 ENCOUNTER — TELEPHONE (OUTPATIENT)
Dept: INFECTIOUS DISEASES | Facility: CLINIC | Age: 28
End: 2023-12-26

## 2023-12-26 ENCOUNTER — TELEPHONE (OUTPATIENT)
Dept: NEPHROLOGY | Facility: CLINIC | Age: 28
End: 2023-12-26

## 2023-12-26 ENCOUNTER — INFUSION THERAPY VISIT (OUTPATIENT)
Dept: INFUSION THERAPY | Facility: CLINIC | Age: 28
End: 2023-12-26
Attending: STUDENT IN AN ORGANIZED HEALTH CARE EDUCATION/TRAINING PROGRAM

## 2023-12-26 DIAGNOSIS — K70.10 ACUTE ALCOHOLIC HEPATITIS (H): ICD-10-CM

## 2023-12-26 DIAGNOSIS — Z45.2 PICC (PERIPHERALLY INSERTED CENTRAL CATHETER) REMOVAL: Primary | ICD-10-CM

## 2023-12-26 PROCEDURE — G0463 HOSPITAL OUTPT CLINIC VISIT: HCPCS

## 2023-12-26 NOTE — TELEPHONE ENCOUNTER
Spoke with [facility] pharmacist today, Pt is done with IV therapy and has appt to get midline removed at [facility] infusion center today. Put orders in to have removed.

## 2023-12-26 NOTE — PROGRESS NOTES
Infusion Nursing Note:  Priya Castano presents today for midline removal.    Patient seen by provider today: No   present during visit today: Not Applicable.    Note: removed pt's midline from right upper arm per protocol. No signs of infection at site. Covered with sterile gauze and secured with Coban. Pt instructed to leave dressing in place for 24 hours. She verbalized understanding.       Intravenous Access:  Midline; removed as above      Post Infusion Assessment:  Patient tolerated removal without incident.  Access discontinued per protocol.       Discharge Plan:   Patient discharged in stable condition accompanied by: self.  Departure Mode: Ambulatory.      Ela Sahu RN

## 2023-12-26 NOTE — TELEPHONE ENCOUNTER
ROBBIEM to schedule new Nephrology appointment for CKD with labs prior with Dr. Mccall at the end of January // second attempt, max attempts reached. 12.26.2023 MCE

## 2023-12-27 ENCOUNTER — HOSPITAL ENCOUNTER (OUTPATIENT)
Dept: ULTRASOUND IMAGING | Facility: CLINIC | Age: 28
Discharge: HOME | End: 2023-12-27
Attending: STUDENT IN AN ORGANIZED HEALTH CARE EDUCATION/TRAINING PROGRAM | Admitting: STUDENT IN AN ORGANIZED HEALTH CARE EDUCATION/TRAINING PROGRAM

## 2023-12-27 DIAGNOSIS — D64.9 ANEMIA, UNSPECIFIED TYPE: ICD-10-CM

## 2023-12-27 DIAGNOSIS — K70.11 ALCOHOLIC HEPATITIS WITH ASCITES (H): ICD-10-CM

## 2023-12-27 PROCEDURE — P9047 ALBUMIN (HUMAN), 25%, 50ML: HCPCS | Performed by: STUDENT IN AN ORGANIZED HEALTH CARE EDUCATION/TRAINING PROGRAM

## 2023-12-27 PROCEDURE — 250N000009 HC RX 250: Performed by: RADIOLOGY

## 2023-12-27 PROCEDURE — 272N000706 US PARACENTESIS WITH ALBUMIN

## 2023-12-27 PROCEDURE — 250N000011 HC RX IP 250 OP 636: Performed by: STUDENT IN AN ORGANIZED HEALTH CARE EDUCATION/TRAINING PROGRAM

## 2023-12-27 RX ORDER — ALBUMIN (HUMAN) 12.5 G/50ML
50 SOLUTION INTRAVENOUS ONCE
Status: COMPLETED | OUTPATIENT
Start: 2023-12-27 | End: 2023-12-27

## 2023-12-27 RX ORDER — LIDOCAINE 40 MG/G
CREAM TOPICAL
Status: DISCONTINUED | OUTPATIENT
Start: 2023-12-27 | End: 2023-12-28 | Stop reason: HOSPADM

## 2023-12-27 RX ADMIN — ALBUMIN HUMAN 50 G: 0.25 SOLUTION INTRAVENOUS at 10:05

## 2023-12-27 RX ADMIN — LIDOCAINE HYDROCHLORIDE 8 ML: 10 INJECTION, SOLUTION EPIDURAL; INFILTRATION; INTRACAUDAL; PERINEURAL at 09:25

## 2023-12-27 NOTE — PROGRESS NOTES
LLQ paracentesis performed by radiologist.    5000 Ml clear yellow fluid removed. Pressure held at site after catheter removed. No bleeding noted. Given Albumin per protocol.

## 2023-12-27 NOTE — DISCHARGE INSTRUCTIONS
Radiology  Discharge Instructions for Abdominal Paracentesis    You have had an abdominal paracentesis procedure today.  A needle or catheter was put into your abdomen to remove fluid for laboratory studies and/or to remove the extra fluid from your abdomen.    AFTER YOU ARE HOME:  Rest at home today.  Limit physical activity such as lifting, straining, or exercise for 48 hours.  You may resume normal activity in 24 hours.  Resume previous diet and medications.  You may remove bandage in 24 hours.    CALL YOUR PRIMARY PROVIDER IF:  You develop temperature over 101o F, or redness at the drainage site.  You have any other questions or concerns.    AFTER HOURS CALL [facility] NURSE ADVISORS AT [phone]    COME TO EMERGENCY ROOM IF:  You develop severe abdominal pain, swelling the size of a baseball or larger, continuous oozing from puncture site longer than 24 hours, bleeding that saturates a gauze dressing even after you have put direct pressure on the site for 15 minutes, severe lightheadedness or fainting.  DO NOT DRIVE YOURSELF.

## 2023-12-27 NOTE — PROGRESS NOTES
No bleeding noted at site and pt states understanding of the discharge instructions. Pt d/c instructions reviewed and received. There are no unanswered questions at the time of discharge.

## 2024-01-04 ENCOUNTER — HOSPITAL ENCOUNTER (OUTPATIENT)
Dept: ULTRASOUND IMAGING | Facility: CLINIC | Age: 29
Discharge: HOME | End: 2024-01-04
Attending: STUDENT IN AN ORGANIZED HEALTH CARE EDUCATION/TRAINING PROGRAM | Admitting: STUDENT IN AN ORGANIZED HEALTH CARE EDUCATION/TRAINING PROGRAM

## 2024-01-04 ENCOUNTER — PATIENT OUTREACH (OUTPATIENT)
Dept: GASTROENTEROLOGY | Facility: CLINIC | Age: 29
End: 2024-01-04

## 2024-01-04 ENCOUNTER — TELEPHONE (OUTPATIENT)
Dept: GASTROENTEROLOGY | Facility: CLINIC | Age: 29
End: 2024-01-04

## 2024-01-04 ENCOUNTER — DOCUMENTATION ONLY (OUTPATIENT)
Dept: GASTROENTEROLOGY | Facility: CLINIC | Age: 29
End: 2024-01-04

## 2024-01-04 VITALS
TEMPERATURE: 98.5 F | HEART RATE: 94 BPM | SYSTOLIC BLOOD PRESSURE: 132 MMHG | OXYGEN SATURATION: 99 % | DIASTOLIC BLOOD PRESSURE: 68 MMHG | RESPIRATION RATE: 16 BRPM

## 2024-01-04 DIAGNOSIS — K70.11 ALCOHOLIC HEPATITIS WITH ASCITES (H): ICD-10-CM

## 2024-01-04 DIAGNOSIS — D64.9 ANEMIA, UNSPECIFIED TYPE: Primary | ICD-10-CM

## 2024-01-04 DIAGNOSIS — D64.9 ANEMIA, UNSPECIFIED TYPE: ICD-10-CM

## 2024-01-04 LAB
ALBUMIN SERPL BCG-MCNC: 3.4 G/DL (ref 3.5–5.2)
ALP SERPL-CCNC: 96 U/L (ref 40–150)
ALT SERPL W P-5'-P-CCNC: 12 U/L (ref 0–50)
ANION GAP SERPL CALCULATED.3IONS-SCNC: 11 MMOL/L (ref 7–15)
AST SERPL W P-5'-P-CCNC: 60 U/L (ref 0–45)
BILIRUB SERPL-MCNC: 2.3 MG/DL
BUN SERPL-MCNC: 17.3 MG/DL (ref 6–20)
CALCIUM SERPL-MCNC: 8.5 MG/DL (ref 8.6–10)
CHLORIDE SERPL-SCNC: 106 MMOL/L (ref 98–107)
CREAT SERPL-MCNC: 0.65 MG/DL (ref 0.51–0.95)
DEPRECATED HCO3 PLAS-SCNC: 18 MMOL/L (ref 22–29)
EGFRCR SERPLBLD CKD-EPI 2021: >90 ML/MIN/1.73M2
GLUCOSE SERPL-MCNC: 87 MG/DL (ref 70–99)
HGB BLD-MCNC: 6.6 G/DL (ref 11.7–15.7)
HOLD SPECIMEN: NORMAL
INR PPP: 1.81 (ref 0.85–1.15)
POTASSIUM SERPL-SCNC: 4.3 MMOL/L (ref 3.4–5.3)
PROT SERPL-MCNC: 6.8 G/DL (ref 6.4–8.3)
SODIUM SERPL-SCNC: 135 MMOL/L (ref 135–145)

## 2024-01-04 PROCEDURE — 36415 COLL VENOUS BLD VENIPUNCTURE: CPT | Performed by: STUDENT IN AN ORGANIZED HEALTH CARE EDUCATION/TRAINING PROGRAM

## 2024-01-04 PROCEDURE — 250N000009 HC RX 250: Performed by: RADIOLOGY

## 2024-01-04 PROCEDURE — 272N000706 US PARACENTESIS WITH ALBUMIN

## 2024-01-04 PROCEDURE — 85018 HEMOGLOBIN: CPT | Performed by: STUDENT IN AN ORGANIZED HEALTH CARE EDUCATION/TRAINING PROGRAM

## 2024-01-04 PROCEDURE — 85610 PROTHROMBIN TIME: CPT | Performed by: STUDENT IN AN ORGANIZED HEALTH CARE EDUCATION/TRAINING PROGRAM

## 2024-01-04 PROCEDURE — 80053 COMPREHEN METABOLIC PANEL: CPT | Performed by: STUDENT IN AN ORGANIZED HEALTH CARE EDUCATION/TRAINING PROGRAM

## 2024-01-04 PROCEDURE — P9047 ALBUMIN (HUMAN), 25%, 50ML: HCPCS | Performed by: STUDENT IN AN ORGANIZED HEALTH CARE EDUCATION/TRAINING PROGRAM

## 2024-01-04 PROCEDURE — 250N000011 HC RX IP 250 OP 636: Performed by: STUDENT IN AN ORGANIZED HEALTH CARE EDUCATION/TRAINING PROGRAM

## 2024-01-04 RX ORDER — ALBUMIN (HUMAN) 12.5 G/50ML
25-50 SOLUTION INTRAVENOUS ONCE
Status: COMPLETED | OUTPATIENT
Start: 2024-01-04 | End: 2024-01-04

## 2024-01-04 RX ORDER — LIDOCAINE 40 MG/G
CREAM TOPICAL
Status: DISCONTINUED | OUTPATIENT
Start: 2024-01-04 | End: 2024-01-05 | Stop reason: HOSPADM

## 2024-01-04 RX ORDER — ALBUMIN (HUMAN) 12.5 G/50ML
25-50 SOLUTION INTRAVENOUS ONCE
Status: DISCONTINUED | OUTPATIENT
Start: 2024-01-04 | End: 2024-01-04

## 2024-01-04 RX ADMIN — ALBUMIN HUMAN 50 G: 0.25 SOLUTION INTRAVENOUS at 08:30

## 2024-01-04 RX ADMIN — LIDOCAINE HYDROCHLORIDE 5 ML: 10 INJECTION, SOLUTION EPIDURAL; INFILTRATION; INTRACAUDAL; PERINEURAL at 08:15

## 2024-01-04 NOTE — PROGRESS NOTES
DATE/TIME OF CALL RECEIVED FROM LAB:  01/04/24 at 10:56 AM   LAB TEST:  Hemoglobin  LAB VALUE:  6.6  PROVIDER NOTIFIED?: Yes  PROVIDER NAME: Luci Mendez  DATE/TIME LAB VALUE REPORTED TO PROVIDER: 1/4/24  MECHANISM OF PROVIDER NOTIFICATION: inbasket message + page  PROVIDER RESPONSE: 1 unit of PRBC.   Patient's care coordinator Nadja Felder RN will assist patient with getting scheduled for blood transfusion.

## 2024-01-04 NOTE — PROGRESS NOTES
RUQ paracentesis performed by radiologist.  5000 ml clear yellow fluid removed. Pressure held at site after catheter removed. No bleeding noted. Given Albumin 50 g per Dr Mendez order.  Noted that patient had labs ordered.  Drawn from IV prior to discharge.  Patient feeling more comfortable after paracentesis done.

## 2024-01-04 NOTE — TELEPHONE ENCOUNTER
Verbal consent obtained with patient for 1 unit of packed red blood cells. Consent obtained by Dr. Luci Mendez, Disha Mai, DAIJA and writer. Completed consent faxed to HIM urgent.    Routed to patient's hepatology RN care coordinator.    JENNIFER RestrepoN, RN, PHN  Hepatology Clinic  [facility]  [facility]

## 2024-01-04 NOTE — DISCHARGE INSTRUCTIONS
Radiology  Discharge Instructions for Abdominal Paracentesis    You have had an abdominal paracentesis procedure today.  A needle or catheter was put into your abdomen to remove fluid for laboratory studies and/or to remove the extra fluid from your abdomen.    AFTER YOU ARE HOME:  Rest at home today.  Limit physical activity such as lifting, straining, or exercise for 48 hours.  You may resume normal activity in 24 hours.  Resume previous diet and medications.  You may remove bandage in 24 hours.    CALL YOUR PRIMARY PROVIDER IF:  You develop temperature over 101o F, or redness at the drainage site.  You have any other questions or concerns.    AFTER HOURS CALL [facility] NURSE ADVISORS AT [phone]    COME TO EMERGENCY ROOM IF:  You develop severe abdominal pain, swelling the size of a baseball or larger, continuous oozing from puncture site longer than 24 hours, bleeding that saturates a gauze dressing even after you have put direct pressure on the site for 15 minutes, severe lightheadedness or fainting.  DO NOT DRIVE YOURSELF.

## 2024-01-05 ENCOUNTER — LAB (OUTPATIENT)
Dept: INFUSION THERAPY | Facility: HOSPITAL | Age: 29
End: 2024-01-05
Attending: INTERNAL MEDICINE

## 2024-01-05 ENCOUNTER — INFUSION THERAPY VISIT (OUTPATIENT)
Dept: INFUSION THERAPY | Facility: HOSPITAL | Age: 29
End: 2024-01-05
Attending: INTERNAL MEDICINE

## 2024-01-05 VITALS
HEART RATE: 105 BPM | RESPIRATION RATE: 16 BRPM | TEMPERATURE: 98.4 F | DIASTOLIC BLOOD PRESSURE: 56 MMHG | SYSTOLIC BLOOD PRESSURE: 110 MMHG | OXYGEN SATURATION: 99 %

## 2024-01-05 DIAGNOSIS — D64.9 ANEMIA, UNSPECIFIED TYPE: Primary | ICD-10-CM

## 2024-01-05 LAB
ABO/RH(D): NORMAL
ANTIBODY SCREEN: NEGATIVE
BLD PROD TYP BPU: NORMAL
BLOOD COMPONENT TYPE: NORMAL
CODING SYSTEM: NORMAL
CROSSMATCH: NORMAL
ISSUE DATE AND TIME: NORMAL
SPECIMEN EXPIRATION DATE: NORMAL
UNIT ABO/RH: NORMAL
UNIT NUMBER: NORMAL
UNIT STATUS: NORMAL
UNIT TYPE ISBT: 5100

## 2024-01-05 PROCEDURE — 86900 BLOOD TYPING SEROLOGIC ABO: CPT | Performed by: STUDENT IN AN ORGANIZED HEALTH CARE EDUCATION/TRAINING PROGRAM

## 2024-01-05 PROCEDURE — P9016 RBC LEUKOCYTES REDUCED: HCPCS | Performed by: STUDENT IN AN ORGANIZED HEALTH CARE EDUCATION/TRAINING PROGRAM

## 2024-01-05 PROCEDURE — 36415 COLL VENOUS BLD VENIPUNCTURE: CPT | Performed by: STUDENT IN AN ORGANIZED HEALTH CARE EDUCATION/TRAINING PROGRAM

## 2024-01-05 PROCEDURE — 36430 TRANSFUSION BLD/BLD COMPNT: CPT

## 2024-01-05 PROCEDURE — 86923 COMPATIBILITY TEST ELECTRIC: CPT | Performed by: STUDENT IN AN ORGANIZED HEALTH CARE EDUCATION/TRAINING PROGRAM

## 2024-01-05 RX ORDER — HEPARIN SODIUM (PORCINE) LOCK FLUSH IV SOLN 100 UNIT/ML 100 UNIT/ML
5 SOLUTION INTRAVENOUS
Status: CANCELLED | OUTPATIENT
Start: 2024-01-05

## 2024-01-05 RX ORDER — HEPARIN SODIUM,PORCINE 10 UNIT/ML
5-20 VIAL (ML) INTRAVENOUS DAILY PRN
Status: CANCELLED | OUTPATIENT
Start: 2024-01-05

## 2024-01-05 RX ORDER — HEPARIN SODIUM (PORCINE) LOCK FLUSH IV SOLN 100 UNIT/ML 100 UNIT/ML
5 SOLUTION INTRAVENOUS
OUTPATIENT
Start: 2024-01-05

## 2024-01-05 RX ORDER — HEPARIN SODIUM,PORCINE 10 UNIT/ML
5-20 VIAL (ML) INTRAVENOUS DAILY PRN
OUTPATIENT
Start: 2024-01-05

## 2024-01-05 NOTE — PROGRESS NOTES
"Spoke with pt regarding critical Hgb 6.6 from labs checked 1/4 - Dr. Mendez recommends pt get set up with appt for transfusion of 1 unit PRBC. Writer was able to find next day appt for pt at 9am at [facility], pt will need lab appt for type and screen in the afternoon of 1/4 or the morning prior to the appt. Pt verbalzed understands, states she will make it to the appt, prefers to schedule lab appt immediately prior to blood transfusion due to work schedule.  Pt states she's \"feeling fine\", has noticed she's been feeling more fatigued this week. Denies any weakness, dizziness, or lightheadedness.  Will follow-up with pt after blood transfusion appt.    Nadja Felder, RN Care Coordinator  [facility]  Hepatology Clinic/Specialty Program    "

## 2024-01-05 NOTE — PROGRESS NOTES
Infusion Nursing Note:  Priya Castano presents today for 1 unit PRBC.    Patient seen by provider today: No   present during visit today: Not Applicable.    Note: Pt arrives to infusion today feeling well. Denies any fever, chills, cough, increased sob, chest pain or any other new complaints or concerns. She had a paracentesis yesterday, so she states she feels good.     Intravenous Access:  Peripheral IV placed.    Treatment Conditions:  Lab Results   Component Value Date    HGB 6.6 (LL) 01/04/2024    WBC 14.0 (H) 12/20/2023    ANEUTAUTO 10.8 (H) 12/20/2023     12/20/2023     Results reviewed, labs MET treatment parameters, ok to proceed with treatment.  Blood transfusion consent signed 11/7/23.    Post Infusion Assessment:  Patient tolerated infusion without incident.  Blood return noted pre and post infusion.  Site patent and intact, free from redness, edema or discomfort.  No evidence of extravasations.  Access discontinued per protocol.     Discharge Plan:   Patient discharged in stable condition accompanied by: self.  Departure Mode: Ambulatory.      Angela Kelley RN

## 2024-01-10 ENCOUNTER — MYC REFILL (OUTPATIENT)
Dept: GASTROENTEROLOGY | Facility: CLINIC | Age: 29
End: 2024-01-10

## 2024-01-10 DIAGNOSIS — K70.31 ALCOHOLIC CIRRHOSIS OF LIVER WITH ASCITES (H): Primary | ICD-10-CM

## 2024-01-10 DIAGNOSIS — K70.31 ALCOHOLIC CIRRHOSIS OF LIVER WITH ASCITES (H): ICD-10-CM

## 2024-01-10 RX ORDER — FUROSEMIDE 20 MG
20 TABLET ORAL DAILY
Qty: 90 TABLET | Refills: 1 | Status: SHIPPED | OUTPATIENT
Start: 2024-01-10 | End: 2024-01-19

## 2024-01-10 RX ORDER — SPIRONOLACTONE 50 MG/1
50 TABLET, FILM COATED ORAL DAILY
Qty: 90 TABLET | Refills: 0 | Status: SHIPPED | OUTPATIENT
Start: 2024-01-10 | End: 2024-01-19

## 2024-01-10 RX ORDER — FUROSEMIDE 20 MG
20 TABLET ORAL DAILY
Qty: 90 TABLET | Refills: 0 | Status: SHIPPED | OUTPATIENT
Start: 2024-01-10 | End: 2024-01-10

## 2024-01-11 ENCOUNTER — TELEPHONE (OUTPATIENT)
Dept: GASTROENTEROLOGY | Facility: CLINIC | Age: 29
End: 2024-01-11

## 2024-01-11 ENCOUNTER — HOSPITAL ENCOUNTER (OUTPATIENT)
Dept: ULTRASOUND IMAGING | Facility: CLINIC | Age: 29
Discharge: HOME | End: 2024-01-11
Attending: STUDENT IN AN ORGANIZED HEALTH CARE EDUCATION/TRAINING PROGRAM | Admitting: STUDENT IN AN ORGANIZED HEALTH CARE EDUCATION/TRAINING PROGRAM

## 2024-01-11 VITALS
OXYGEN SATURATION: 100 % | SYSTOLIC BLOOD PRESSURE: 125 MMHG | HEART RATE: 100 BPM | DIASTOLIC BLOOD PRESSURE: 73 MMHG | RESPIRATION RATE: 16 BRPM | TEMPERATURE: 98.2 F

## 2024-01-11 DIAGNOSIS — K70.11 ALCOHOLIC HEPATITIS WITH ASCITES (H): ICD-10-CM

## 2024-01-11 DIAGNOSIS — D64.9 ANEMIA, UNSPECIFIED TYPE: ICD-10-CM

## 2024-01-11 PROCEDURE — 250N000009 HC RX 250: Performed by: RADIOLOGY

## 2024-01-11 PROCEDURE — 250N000011 HC RX IP 250 OP 636: Performed by: STUDENT IN AN ORGANIZED HEALTH CARE EDUCATION/TRAINING PROGRAM

## 2024-01-11 PROCEDURE — 272N000706 US PARACENTESIS WITH ALBUMIN

## 2024-01-11 PROCEDURE — P9047 ALBUMIN (HUMAN), 25%, 50ML: HCPCS | Performed by: STUDENT IN AN ORGANIZED HEALTH CARE EDUCATION/TRAINING PROGRAM

## 2024-01-11 RX ORDER — ALBUMIN (HUMAN) 12.5 G/50ML
25-50 SOLUTION INTRAVENOUS ONCE
Status: DISCONTINUED | OUTPATIENT
Start: 2024-01-11 | End: 2024-01-11

## 2024-01-11 RX ORDER — ALBUMIN (HUMAN) 12.5 G/50ML
25-50 SOLUTION INTRAVENOUS ONCE
Status: COMPLETED | OUTPATIENT
Start: 2024-01-11 | End: 2024-01-11

## 2024-01-11 RX ORDER — LIDOCAINE 40 MG/G
CREAM TOPICAL
Status: DISCONTINUED | OUTPATIENT
Start: 2024-01-11 | End: 2024-01-12 | Stop reason: HOSPADM

## 2024-01-11 RX ADMIN — LIDOCAINE HYDROCHLORIDE 10 ML: 10 INJECTION, SOLUTION EPIDURAL; INFILTRATION; INTRACAUDAL; PERINEURAL at 14:50

## 2024-01-11 RX ADMIN — ALBUMIN HUMAN 50 G: 0.25 SOLUTION INTRAVENOUS at 15:00

## 2024-01-11 NOTE — TELEPHONE ENCOUNTER
Spoke with Margie RAMON to draw labs next week.    Nadja Felder, RN Care Coordinator  [facility]  Hepatology Clinic/Specialty Program

## 2024-01-11 NOTE — TELEPHONE ENCOUNTER
[facility] Call Center    Phone Message    May a detailed message be left on voicemail: yes     Reason for Call: Other: Margie is requesting a call back from the team please, she is currently with the Pt and Pt has an IV in. Would team like Pt's labs drawn today or should that wait until next week? Please advise. Thank you!     Action Taken: Message routed to:  [facility] ([facility]): Hepatology    Travel Screening: Not Applicable

## 2024-01-11 NOTE — DISCHARGE INSTRUCTIONS
Radiology  Discharge Instructions for Abdominal Paracentesis    You have had an abdominal paracentesis procedure today.  A needle or catheter was put into your abdomen to remove fluid for laboratory studies and/or to remove the extra fluid from your abdomen.    AFTER YOU ARE HOME:  Rest at home today.  Limit physical activity such as lifting, straining, or exercise for 48 hours.  You may resume normal activity in 24 hours.  Resume previous diet and medications.  You may remove bandage in 24 hours.    CALL YOUR PRIMARY PROVIDER IF:  You develop temperature over 101o F, or redness at the drainage site.  You have any other questions or concerns.    AFTER HOURS CALL [facility] NURSE ADVISORS AT [phone]    COME TO EMERGENCY ROOM IF:  You develop severe abdominal pain, swelling the size of a baseball or larger, continuous oozing from puncture site longer than 24 hours, bleeding that saturates a gauze dressing even after you have put direct pressure on the site for 15 minutes, severe lightheadedness or fainting.  DO NOT DRIVE YOURSELF.

## 2024-01-17 ENCOUNTER — HOSPITAL ENCOUNTER (OUTPATIENT)
Dept: ULTRASOUND IMAGING | Facility: CLINIC | Age: 29
Discharge: HOME | End: 2024-01-17
Attending: STUDENT IN AN ORGANIZED HEALTH CARE EDUCATION/TRAINING PROGRAM | Admitting: STUDENT IN AN ORGANIZED HEALTH CARE EDUCATION/TRAINING PROGRAM

## 2024-01-17 VITALS — RESPIRATION RATE: 15 BRPM | HEART RATE: 104 BPM | SYSTOLIC BLOOD PRESSURE: 134 MMHG | DIASTOLIC BLOOD PRESSURE: 83 MMHG

## 2024-01-17 DIAGNOSIS — K70.31 ALCOHOLIC CIRRHOSIS OF LIVER WITH ASCITES (H): ICD-10-CM

## 2024-01-17 DIAGNOSIS — D64.9 ANEMIA, UNSPECIFIED TYPE: ICD-10-CM

## 2024-01-17 DIAGNOSIS — K70.11 ALCOHOLIC HEPATITIS WITH ASCITES (H): ICD-10-CM

## 2024-01-17 LAB
ALBUMIN SERPL BCG-MCNC: 3.1 G/DL (ref 3.5–5.2)
ALP SERPL-CCNC: 122 U/L (ref 40–150)
ALT SERPL W P-5'-P-CCNC: 17 U/L (ref 0–50)
ANION GAP SERPL CALCULATED.3IONS-SCNC: 10 MMOL/L (ref 7–15)
AST SERPL W P-5'-P-CCNC: 63 U/L (ref 0–45)
BILIRUB SERPL-MCNC: 2.2 MG/DL
BUN SERPL-MCNC: 8.5 MG/DL (ref 6–20)
CALCIUM SERPL-MCNC: 8.1 MG/DL (ref 8.6–10)
CHLORIDE SERPL-SCNC: 104 MMOL/L (ref 98–107)
CREAT SERPL-MCNC: 0.6 MG/DL (ref 0.51–0.95)
DEPRECATED HCO3 PLAS-SCNC: 22 MMOL/L (ref 22–29)
EGFRCR SERPLBLD CKD-EPI 2021: >90 ML/MIN/1.73M2
ERYTHROCYTE [DISTWIDTH] IN BLOOD BY AUTOMATED COUNT: 18.6 % (ref 10–15)
GLUCOSE SERPL-MCNC: 99 MG/DL (ref 70–99)
HCT VFR BLD AUTO: 24.5 % (ref 35–47)
HGB BLD-MCNC: 7.9 G/DL (ref 11.7–15.7)
MCH RBC QN AUTO: 30.2 PG (ref 26.5–33)
MCHC RBC AUTO-ENTMCNC: 32.2 G/DL (ref 31.5–36.5)
MCV RBC AUTO: 94 FL (ref 78–100)
PLATELET # BLD AUTO: 178 10E3/UL (ref 150–450)
POTASSIUM SERPL-SCNC: 3.2 MMOL/L (ref 3.4–5.3)
PROT SERPL-MCNC: 6.8 G/DL (ref 6.4–8.3)
RBC # BLD AUTO: 2.62 10E6/UL (ref 3.8–5.2)
SODIUM SERPL-SCNC: 136 MMOL/L (ref 135–145)
WBC # BLD AUTO: 7.9 10E3/UL (ref 4–11)

## 2024-01-17 PROCEDURE — 250N000009 HC RX 250: Performed by: RADIOLOGY

## 2024-01-17 PROCEDURE — 49083 ABD PARACENTESIS W/IMAGING: CPT

## 2024-01-17 PROCEDURE — 36415 COLL VENOUS BLD VENIPUNCTURE: CPT

## 2024-01-17 PROCEDURE — P9047 ALBUMIN (HUMAN), 25%, 50ML: HCPCS | Performed by: STUDENT IN AN ORGANIZED HEALTH CARE EDUCATION/TRAINING PROGRAM

## 2024-01-17 PROCEDURE — 80053 COMPREHEN METABOLIC PANEL: CPT

## 2024-01-17 PROCEDURE — 250N000011 HC RX IP 250 OP 636: Performed by: STUDENT IN AN ORGANIZED HEALTH CARE EDUCATION/TRAINING PROGRAM

## 2024-01-17 PROCEDURE — 85027 COMPLETE CBC AUTOMATED: CPT

## 2024-01-17 RX ORDER — LIDOCAINE 40 MG/G
CREAM TOPICAL
Status: DISCONTINUED | OUTPATIENT
Start: 2024-01-17 | End: 2024-01-18 | Stop reason: HOSPADM

## 2024-01-17 RX ORDER — ALBUMIN (HUMAN) 12.5 G/50ML
50 SOLUTION INTRAVENOUS ONCE
Status: COMPLETED | OUTPATIENT
Start: 2024-01-17 | End: 2024-01-17

## 2024-01-17 RX ADMIN — LIDOCAINE HYDROCHLORIDE 9 ML: 10 INJECTION, SOLUTION EPIDURAL; INFILTRATION; INTRACAUDAL; PERINEURAL at 14:11

## 2024-01-17 RX ADMIN — ALBUMIN HUMAN 50 G: 0.25 SOLUTION INTRAVENOUS at 14:37

## 2024-01-17 NOTE — DISCHARGE INSTRUCTIONS
Radiology  Discharge Instructions for Abdominal Paracentesis    You have had an abdominal paracentesis procedure today.  A needle or catheter was put into your abdomen to remove fluid for laboratory studies and/or to remove the extra fluid from your abdomen.    AFTER YOU ARE HOME:  Rest at home today.  Limit physical activity such as lifting, straining, or exercise for 48 hours.  You may resume normal activity in 24 hours.  Resume previous diet and medications.  You may remove bandage in 24 hours.    CALL YOUR PRIMARY PROVIDER IF:  You develop temperature over 101o F, or redness at the drainage site.  You have any other questions or concerns.    AFTER HOURS CALL [facility] NURSE ADVISORS AT [phone]    COME TO EMERGENCY ROOM IF:  You develop severe abdominal pain, swelling the size of a baseball or larger, continuous oozing from puncture site longer than 24 hours, bleeding that saturates a gauze dressing even after you have put direct pressure on the site for 15 minutes, severe lightheadedness or fainting.  DO NOT DRIVE YOURSELF.

## 2024-01-17 NOTE — PROGRESS NOTES
RLQ paracentesis performed by radiologist. 5000 Ml clear yellow fluid removed. Pressure held at site after catheter removed. No bleeding noted. Skin glue applied. Given 50GM Albumin per Dr Mendez order.

## 2024-01-19 ENCOUNTER — PATIENT OUTREACH (OUTPATIENT)
Dept: GASTROENTEROLOGY | Facility: CLINIC | Age: 29
End: 2024-01-19

## 2024-01-19 DIAGNOSIS — E87.6 HYPOKALEMIA: Primary | ICD-10-CM

## 2024-01-19 DIAGNOSIS — K70.31 ALCOHOLIC CIRRHOSIS OF LIVER WITH ASCITES (H): ICD-10-CM

## 2024-01-19 RX ORDER — POTASSIUM CHLORIDE 1500 MG/1
20 TABLET, EXTENDED RELEASE ORAL DAILY
Qty: 90 TABLET | Refills: 0 | Status: SHIPPED | OUTPATIENT
Start: 2024-01-19

## 2024-01-19 RX ORDER — SPIRONOLACTONE 50 MG/1
100 TABLET, FILM COATED ORAL DAILY
Qty: 180 TABLET | Refills: 1 | Status: SHIPPED | OUTPATIENT
Start: 2024-01-19 | End: 2024-02-06 | Stop reason: DRUGHIGH

## 2024-01-19 RX ORDER — FUROSEMIDE 20 MG
40 TABLET ORAL DAILY
Qty: 180 TABLET | Refills: 1 | Status: SHIPPED | OUTPATIENT
Start: 2024-01-19 | End: 2024-02-06 | Stop reason: DRUGHIGH

## 2024-01-19 NOTE — PROGRESS NOTES
"Spoke with pt for check in. Per Dr. Mendez, based on pt's lab results from 1/17 she is OK to increase diuretic doses to furosemide 40mg daily and spironolactone 100mg daily with BMP check in 3-5 days if still having ascites, start potassium chloride 20mEq due to low K+ level.  Pt reports improvement in abdominal distention and leg swelling since starting water pills, but still requiring weekly paracentesis appts. Last had para on 1/17 with 5L output, states she left feeling like she \"had more fluid left to drain\". Pt plans to increase diuretics today or tomorrow and check labs at next para appt on 1/24.   Denies any melena, hematochezia, or hematemesis. Denies any mental fogginess or overt confusion. Denies any fever, chills, or sweats. Denies any itching or jaundice. States she overall feels like she \"has a little more energy\".  Will plan to check back in with pt with next set of lab results.    Nadja Felder RN Care Coordinator  [facility]  Hepatology Clinic/Specialty Program   "

## 2024-01-20 ENCOUNTER — NURSE TRIAGE (OUTPATIENT)
Dept: NURSING | Facility: CLINIC | Age: 29
End: 2024-01-20

## 2024-01-20 ENCOUNTER — HOSPITAL ENCOUNTER (EMERGENCY)
Facility: CLINIC | Age: 29
Discharge: HOME | End: 2024-01-20
Attending: PHYSICIAN ASSISTANT | Admitting: PHYSICIAN ASSISTANT
Payer: COMMERCIAL

## 2024-01-20 VITALS
HEART RATE: 111 BPM | OXYGEN SATURATION: 100 % | DIASTOLIC BLOOD PRESSURE: 73 MMHG | RESPIRATION RATE: 22 BRPM | TEMPERATURE: 98.7 F | SYSTOLIC BLOOD PRESSURE: 139 MMHG

## 2024-01-20 DIAGNOSIS — R21 RASH AND NONSPECIFIC SKIN ERUPTION: ICD-10-CM

## 2024-01-20 PROCEDURE — 99214 OFFICE O/P EST MOD 30 MIN: CPT | Performed by: PHYSICIAN ASSISTANT

## 2024-01-20 PROCEDURE — G0463 HOSPITAL OUTPT CLINIC VISIT: HCPCS | Performed by: PHYSICIAN ASSISTANT

## 2024-01-20 RX ORDER — PREDNISONE 20 MG/1
TABLET ORAL
Qty: 10 TABLET | Refills: 0 | Status: SHIPPED | OUTPATIENT
Start: 2024-01-20 | End: 2024-02-02

## 2024-01-20 ASSESSMENT — ENCOUNTER SYMPTOMS
FEVER: 0
FATIGUE: 1

## 2024-01-20 NOTE — ED PROVIDER NOTES
History     Chief Complaint   Patient presents with    Rash     Patient began developing a pruritic  olesya on her chest 1/18/2024 . The rash has since spread to her arms thighs,face ,abdomen and back.   Patient denies any new foods, laundry detergent   No fever , patient has tried taking benadryl with no relief. Patient states her lips are swollen too.      FIONA Castano is a 28 year old female with history of liver cirrhosis who presents with complaints of pruritic rash for the past 2 days.  Patient states she developed what looked to be red pruritic hives across her chest when symptoms started 2 days ago.  The rash has since spread to involve her face, back, forearms, and now proximal legs.  Patient states her lips feel more swollen as well.  Denies any other associated symptoms; denies difficulties breathing, swallowing, or the sensation of her throat closing/swelling.  Denies any known new exposures such as new detergents, shampoo, foods, or soaps.  Does not have any contacts with similar rashes.  Pt denies any food allergies.  Feels somewhat fatigued but otherwise denies associated infectious symptoms; states she otherwise feels well.  Denies fevers, chills, cough, sore throat, sinus pressure, nasal congestion, nausea, vomiting, diarrhea, or abdominal pain.  She has tried taking Benadryl at home without much improvement.  No new medications.    Patient had routine lab work obtained 3 days ago with normal platelets at that time.  Kidney function was also normal.  Bilirubin was stable at 2.2.  AST of 63 and ALT normal at 17.      Allergies:  Allergies   Allergen Reactions    Bee Venom Hives and Shortness Of Breath       Problem List:    Patient Active Problem List    Diagnosis Date Noted    Anemia 12/13/2023     Priority: Medium    Cirrhosis of liver (H) 12/10/2023     Priority: Medium    Acute bronchitis due to respiratory syncytial virus (RSV) 12/09/2023     Priority: Medium    FELIX (acute kidney injury)  (H24) 12/09/2023     Priority: Medium    SIRS (systemic inflammatory response syndrome) (H) 12/05/2023     Priority: Medium    Alcoholic cirrhosis of liver with ascites (H) 12/05/2023     Priority: Medium    Anemia, unspecified type 12/05/2023     Priority: Medium    Elevated bilirubin 07/27/2023     Priority: Medium    Acute alcoholic intoxication in alcoholism without complication (H) 07/27/2023     Priority: Medium    Ascites due to alcoholic cirrhosis (H) 07/27/2023     Priority: Medium    Acute alcoholic hepatitis (H28) 07/27/2023     Priority: Medium    Hypothyroidism 06/09/2015     Priority: Medium    Hypothyroidism 02/14/2013     Priority: Medium        Past Medical History:    Past Medical History:   Diagnosis Date    Alcoholic cirrhosis of liver with ascites (H)     Hypothyroidism     SBP (spontaneous bacterial peritonitis) (H)        Past Surgical History:    Past Surgical History:   Procedure Laterality Date    IR PARACENTESIS  07/28/2023    MIDLINE INSERTION - SINGLE LUMEN Right 12/14/2023    20cm, Basilic vein       Family History:    Family History   Problem Relation Age of Onset    Thyroid Disease Mother        Social History:  Marital Status:  Single [1]  Social History     Tobacco Use    Smoking status: Never    Smokeless tobacco: Never   Substance Use Topics    Alcohol use: Yes     Comment: 1 bottle wine daily    Drug use: No        Medications:    predniSONE (DELTASONE) 20 MG tablet  escitalopram (LEXAPRO) 10 MG tablet  folic acid (FOLVITE) 1 MG tablet  furosemide (LASIX) 20 MG tablet  lactulose (CHRONULAC) 10 GM/15ML solution  midodrine (PROAMATINE) 2.5 MG tablet  oxyCODONE (ROXICODONE) 5 MG tablet  potassium chloride ER (KLOR-CON M) 20 MEQ CR tablet  spironolactone (ALDACTONE) 50 MG tablet          Review of Systems   Constitutional:  Positive for fatigue. Negative for fever.   Skin:  Positive for rash.   All other systems reviewed and are negative.      Physical Exam   BP: 139/73  Pulse:  111  Temp: 98.7  F (37.1  C)  Resp: 22  SpO2: 100 %      Physical Exam  Constitutional:       General: She is not in acute distress.     Appearance: Normal appearance. She is well-developed. She is not ill-appearing, toxic-appearing or diaphoretic.   HENT:      Head: Normocephalic and atraumatic.      Right Ear: Tympanic membrane, ear canal and external ear normal.      Left Ear: Tympanic membrane, ear canal and external ear normal.      Nose: Nose normal. No congestion or rhinorrhea.      Mouth/Throat:      Lips: Pink.      Mouth: Mucous membranes are moist.      Pharynx: Oropharynx is clear. Uvula midline. No pharyngeal swelling, oropharyngeal exudate, posterior oropharyngeal erythema or uvula swelling.      Tonsils: No tonsillar exudate or tonsillar abscesses.   Eyes:      Extraocular Movements: Extraocular movements intact.      Conjunctiva/sclera: Conjunctivae normal.      Pupils: Pupils are equal, round, and reactive to light.   Cardiovascular:      Rate and Rhythm: Normal rate and regular rhythm.      Heart sounds: Normal heart sounds.   Pulmonary:      Effort: Pulmonary effort is normal. No respiratory distress.      Breath sounds: Normal breath sounds. No stridor. No wheezing, rhonchi or rales.   Musculoskeletal:         General: Normal range of motion.      Cervical back: Normal range of motion and neck supple. No rigidity.   Lymphadenopathy:      Cervical: No cervical adenopathy.   Skin:     General: Skin is warm and dry.      Findings: Rash present.      Comments: Diffuse erythematous raised papular rash across chest, forearms, face, and back.  Rash is blanchable.  No vesicles.  Rash coalesces in places to form larger raised patches.   Neurological:      Mental Status: She is alert and oriented to person, place, and time.   Psychiatric:         Behavior: Behavior is cooperative.         ED Course                 Procedures      No results found for this or any previous visit (from the past 24  hour(s)).    Medications - No data to display    Assessments & Plan (with Medical Decision Making)     Pt is a 28 year old female with history of liver cirrhosis who presents with complaints of pruritic rash for the past 2 days.  Patient states she developed what looked to be red pruritic hives across her chest when symptoms started 2 days ago.  The rash has since spread to involve her face, back, forearms, and now proximal legs.  Patient states her lips feel more swollen as well.  Denies any other associated symptoms; denies difficulties breathing, swallowing, or the sensation of her throat closing/swelling.  Denies any known new exposures such as new detergents, shampoo, foods, or soaps.  Does not have any contacts with similar rashes.  Feels somewhat fatigued but otherwise denies associated infectious symptoms; states she otherwise feels well.  No new medications.  Patient had routine lab work obtained 3 days ago with normal platelets at that time.  Kidney function was also normal.  Bilirubin was stable at 2.2.  AST of 63 and ALT normal at 17.    Pt is afebrile on arrival.  Exam as above.  On exam, pt is noted to have diffuse erythematous raised papular rash across chest, forearms, face, and back.  Rash is blanchable.  No vesicles.  Rash coalesces in places to form larger raised patches.  No evidence of anaphylaxis.  Unclear specific etiology or trigger of rash.  Suspect possible allergic component.  Does not appear consistent with Mckeon-Stanford syndrome, erythema multiforme, strep rash, petechiae, folliculitis, chickenpox, scabies, etc.  Encouraged symptomatic treatments at home including antihistamines such as Zyrtec.  Return precautions were reviewed.  Hand-outs were provided.    Patient was sent with Prednisone and was instructed to follow-up with PCP for continued care and management.  She is to return to the ED for persistent and/or worsening symptoms.  Patient expressed understanding of the diagnosis and  plan and was discharged home in good condition.    I have reviewed the nursing notes.    I have reviewed the findings, diagnosis, plan and need for follow up with the patient.    New Prescriptions    PREDNISONE (DELTASONE) 20 MG TABLET    Take two tablets (= 40mg) each day for 5 (five) days       Final diagnoses:   Rash and nonspecific skin eruption       1/20/2024   [facility] EMERGENCY DEPT      Disclaimer:  This note consists of symbols derived from keyboarding, dictation and/or voice recognition software.  As a result, there may be errors in the script that have gone undetected.  Please consider this when interpreting information found in this chart.     Margie Goodman PA-C  01/20/24 [id number]

## 2024-01-20 NOTE — TELEPHONE ENCOUNTER
Onset ? Hives on Thursday on neck( did look like hives) . Itchy. Next day worsened, spread to back, face, legs, these looked more pink /red, pinpoint. . .. Has taken Benadryl and it is not helping with itching. No known allergen. Continue to increase all over body/face/back. Are tiny, pinpoint Red in color. Afebrile. Has been very fatigued over last couple days. Had sore throat last week and mild cough for couple days, still mild cough present but no sore throat. Afebrile. Yesterday did have chills but not today.     Protocol reviewed, DISPOSITION: See HCP within 4 hours (or PCP Triage)   Call back with worsening symptoms, further questions/concerns.     TAWANDA CRUM RN  [facility] nurse advisors  1/20/2024  3:23 PM  Reason for Disposition   [1] Purple or blood-colored rash (spots or dots) AND [2] no fever AND [3] sounds well to triager    Additional Information   Negative: [1] Life-threatening reaction (anaphylaxis) in the past to similar substance (e.g., food, insect bite/sting, chemical, etc.) AND [2] < 2 hours since exposure   Negative: [1] Sudden onset of rash (within last 2 hours) AND [2] difficulty breathing or swallowing   Negative: Shock suspected (e.g., cold/pale/clammy skin, too weak to stand, low BP, rapid pulse)   Negative: Difficult to awaken or acting confused (e.g., disoriented, slurred speech)   Negative: [1] Purple or blood-colored spots or dots AND [2] fever   Negative: Sounds like a life-threatening emergency to the triager   Negative: [1] Drug rash suspected AND [2] started taking new medicine within last 2 weeks  (Exception: Antihistamine, eye drops, ear drops, decongestant or other OTC cough/cold medicines.)   Negative: [1] Chickenpox suspected AND [2] known exposure to chickenpox in past 3 weeks   Negative: Hives suspected   Negative: Insect bites suspected   Negative: [1] Measles suspected AND [2] known exposure to measles in past 3 weeks   Negative: [1] Mpox suspected (e.g., direct  skin contact such as sex, recent travel to [place] or [place]) AND [2] symptoms of Mpox (e.g., rash, fever, muscle aches, or swollen lymph nodes)   Negative: [1] At risk for Mpox (men-who-have-sex-with-men) AND [2] possible exposure (e.g., multiple sex partners in past 21 days) AND [3] symptoms of Mpox (e.g., rash, fever, muscle aches, or swollen lymph nodes)   Negative: Swimmer's Itch suspected   Negative: Sunburn suspected   Negative: [1] Bright red, sunburn-like rash AND [2] current tampon use or nasal packing   Negative: [1] Bright red, sunburn-like rash AND [3] wound infection or recent surgery   Negative: [1] Bright red skin AND [2] peels off in sheets   Negative: Stiff neck (can't touch chin to chest)   Negative: Fever   Negative: Joint pain or swelling   Negative: Patient sounds very sick or weak to the triager    Protocols used: Rash or Redness - Widespread-A-AH

## 2024-01-24 ENCOUNTER — HOSPITAL ENCOUNTER (OUTPATIENT)
Dept: ULTRASOUND IMAGING | Facility: CLINIC | Age: 29
Discharge: HOME | End: 2024-01-24
Attending: STUDENT IN AN ORGANIZED HEALTH CARE EDUCATION/TRAINING PROGRAM | Admitting: STUDENT IN AN ORGANIZED HEALTH CARE EDUCATION/TRAINING PROGRAM

## 2024-01-24 VITALS
RESPIRATION RATE: 16 BRPM | DIASTOLIC BLOOD PRESSURE: 76 MMHG | HEART RATE: 86 BPM | TEMPERATURE: 97.7 F | SYSTOLIC BLOOD PRESSURE: 127 MMHG | OXYGEN SATURATION: 100 %

## 2024-01-24 DIAGNOSIS — K70.11 ALCOHOLIC HEPATITIS WITH ASCITES (H): ICD-10-CM

## 2024-01-24 DIAGNOSIS — D64.9 ANEMIA, UNSPECIFIED TYPE: ICD-10-CM

## 2024-01-24 DIAGNOSIS — K70.31 ALCOHOLIC CIRRHOSIS OF LIVER WITH ASCITES (H): ICD-10-CM

## 2024-01-24 LAB
ANION GAP SERPL CALCULATED.3IONS-SCNC: 12 MMOL/L (ref 7–15)
BUN SERPL-MCNC: 16.4 MG/DL (ref 6–20)
CALCIUM SERPL-MCNC: 8.7 MG/DL (ref 8.6–10)
CHLORIDE SERPL-SCNC: 105 MMOL/L (ref 98–107)
CREAT SERPL-MCNC: 0.53 MG/DL (ref 0.51–0.95)
DEPRECATED HCO3 PLAS-SCNC: 20 MMOL/L (ref 22–29)
EGFRCR SERPLBLD CKD-EPI 2021: >90 ML/MIN/1.73M2
GLUCOSE SERPL-MCNC: 81 MG/DL (ref 70–99)
POTASSIUM SERPL-SCNC: 3.6 MMOL/L (ref 3.4–5.3)
SODIUM SERPL-SCNC: 137 MMOL/L (ref 135–145)

## 2024-01-24 PROCEDURE — 272N000706 US PARACENTESIS WITH ALBUMIN

## 2024-01-24 PROCEDURE — 250N000009 HC RX 250: Performed by: RADIOLOGY

## 2024-01-24 PROCEDURE — 82565 ASSAY OF CREATININE: CPT | Performed by: STUDENT IN AN ORGANIZED HEALTH CARE EDUCATION/TRAINING PROGRAM

## 2024-01-24 PROCEDURE — P9047 ALBUMIN (HUMAN), 25%, 50ML: HCPCS | Performed by: STUDENT IN AN ORGANIZED HEALTH CARE EDUCATION/TRAINING PROGRAM

## 2024-01-24 PROCEDURE — 250N000011 HC RX IP 250 OP 636: Performed by: STUDENT IN AN ORGANIZED HEALTH CARE EDUCATION/TRAINING PROGRAM

## 2024-01-24 PROCEDURE — 82374 ASSAY BLOOD CARBON DIOXIDE: CPT | Performed by: STUDENT IN AN ORGANIZED HEALTH CARE EDUCATION/TRAINING PROGRAM

## 2024-01-24 PROCEDURE — 36415 COLL VENOUS BLD VENIPUNCTURE: CPT | Performed by: STUDENT IN AN ORGANIZED HEALTH CARE EDUCATION/TRAINING PROGRAM

## 2024-01-24 RX ORDER — ALBUMIN (HUMAN) 12.5 G/50ML
25-50 SOLUTION INTRAVENOUS ONCE
Status: COMPLETED | OUTPATIENT
Start: 2024-01-24 | End: 2024-01-24

## 2024-01-24 RX ORDER — LIDOCAINE 40 MG/G
CREAM TOPICAL
Status: DISCONTINUED | OUTPATIENT
Start: 2024-01-24 | End: 2024-01-25 | Stop reason: HOSPADM

## 2024-01-24 RX ORDER — ALBUMIN (HUMAN) 12.5 G/50ML
25-50 SOLUTION INTRAVENOUS ONCE
Status: CANCELLED | OUTPATIENT
Start: 2024-01-24 | End: 2024-01-24

## 2024-01-24 RX ADMIN — ALBUMIN HUMAN 50 G: 0.25 SOLUTION INTRAVENOUS at 09:25

## 2024-01-24 RX ADMIN — LIDOCAINE HYDROCHLORIDE 10 ML: 10 INJECTION, SOLUTION EPIDURAL; INFILTRATION; INTRACAUDAL; PERINEURAL at 08:12

## 2024-01-24 NOTE — DISCHARGE INSTRUCTIONS
Radiology  Discharge Instructions for Abdominal Paracentesis    You have had an abdominal paracentesis procedure today.  A needle or catheter was put into your abdomen to remove fluid for laboratory studies and/or to remove the extra fluid from your abdomen.    AFTER YOU ARE HOME:  Rest at home today.  Limit physical activity such as lifting, straining, or exercise for 48 hours.  You may resume normal activity in 24 hours.  Resume previous diet and medications.  You may remove bandage in 24 hours.    CALL YOUR PRIMARY PROVIDER IF:  You develop temperature over 101o F, or redness at the drainage site.  You have any other questions or concerns.    AFTER HOURS CALL [facility] NURSE ADVISORS AT [phone]    COME TO EMERGENCY ROOM IF:  You develop severe abdominal pain, swelling the size of a baseball or larger, continuous oozing from puncture site longer than 24 hours, bleeding that saturates a gauze dressing even after you have put direct pressure on the site for 15 minutes, severe lightheadedness or fainting.  DO NOT DRIVE YOURSELF.

## 2024-01-24 NOTE — PROGRESS NOTES
RLQ paracentesis performed by radiologist.  5000   Ml clear yellow fluid removed. Pressure held at site after catheter removed. No bleeding noted. Skin glue applied. Given Albumin per order; 50gms..

## 2024-01-26 ENCOUNTER — PATIENT OUTREACH (OUTPATIENT)
Dept: GASTROENTEROLOGY | Facility: CLINIC | Age: 29
End: 2024-01-26

## 2024-01-26 ENCOUNTER — DOCUMENTATION ONLY (OUTPATIENT)
Dept: INFECTIOUS DISEASES | Facility: CLINIC | Age: 29
End: 2024-01-26

## 2024-01-26 DIAGNOSIS — K70.31 ALCOHOLIC CIRRHOSIS OF LIVER WITH ASCITES (H): Primary | ICD-10-CM

## 2024-01-26 RX ORDER — SPIRONOLACTONE 100 MG/1
100 TABLET, FILM COATED ORAL 2 TIMES DAILY
Qty: 180 TABLET | Refills: 0 | Status: SHIPPED | OUTPATIENT
Start: 2024-01-26

## 2024-01-26 RX ORDER — FUROSEMIDE 40 MG
40 TABLET ORAL 2 TIMES DAILY
Qty: 180 TABLET | Refills: 0 | Status: SHIPPED | OUTPATIENT
Start: 2024-01-26

## 2024-01-26 NOTE — PROGRESS NOTES
Per Dr. Mendez, OK to increase diuretics to furosemide 40mg BID and spironolactone 100mg BID with BMP check within 3-5 days, OK to check Hgb at that time as well. Attempted to reach pt to discuss, no answer, left message providing number for callback, will send MyChart message with provider instructions.    Nadja Felder, RN Care Coordinator  [facility]  Hepatology Clinic/Specialty Program

## 2024-01-26 NOTE — PROGRESS NOTES
"Spoke with pt for check in. Pt increased diuretics doses to furosemide 40mg daily and spironolactone 100mg daily on 1/18, had repeat BMP on 1/24 that showed Cr and electrolytes WNL Pt states she's noticed that her \"fluid rention is down a lot\" and has had improvement in her leg swelling with diuretics increase. Last paracentesis was on 1/24 with 5L output. Message sent to provider regarding potential for increasing diuretics further.  Pt denies any excessive fatigue or lightheadedness. Denies fever, chills, or sweats. Denies melena, hematochezia, or hematemesis. Will reach back out to pt with provider response.    Nadja Felder, RN Care Coordinator  [facility]  Hepatology Clinic/Specialty Program      "

## 2024-01-30 ENCOUNTER — TELEPHONE (OUTPATIENT)
Dept: GASTROENTEROLOGY | Facility: CLINIC | Age: 29
End: 2024-01-30

## 2024-01-30 ENCOUNTER — HOSPITAL ENCOUNTER (OUTPATIENT)
Facility: AMBULATORY SURGERY CENTER | Age: 29
Discharge: HOME | End: 2024-01-30
Attending: STUDENT IN AN ORGANIZED HEALTH CARE EDUCATION/TRAINING PROGRAM
Payer: COMMERCIAL

## 2024-01-30 ENCOUNTER — HOSPITAL ENCOUNTER (OUTPATIENT)
Dept: ULTRASOUND IMAGING | Facility: CLINIC | Age: 29
Discharge: HOME | End: 2024-01-30
Attending: STUDENT IN AN ORGANIZED HEALTH CARE EDUCATION/TRAINING PROGRAM | Admitting: STUDENT IN AN ORGANIZED HEALTH CARE EDUCATION/TRAINING PROGRAM

## 2024-01-30 ENCOUNTER — HOSPITAL ENCOUNTER (OUTPATIENT)
Facility: CLINIC | Age: 29
End: 2024-01-30
Attending: INTERNAL MEDICINE | Admitting: INTERNAL MEDICINE

## 2024-01-30 VITALS
TEMPERATURE: 97.9 F | DIASTOLIC BLOOD PRESSURE: 64 MMHG | RESPIRATION RATE: 16 BRPM | OXYGEN SATURATION: 100 % | HEART RATE: 85 BPM | SYSTOLIC BLOOD PRESSURE: 121 MMHG

## 2024-01-30 DIAGNOSIS — K70.11 ALCOHOLIC HEPATITIS WITH ASCITES (H): ICD-10-CM

## 2024-01-30 DIAGNOSIS — K70.31 ALCOHOLIC CIRRHOSIS OF LIVER WITH ASCITES (H): ICD-10-CM

## 2024-01-30 DIAGNOSIS — D64.9 ANEMIA, UNSPECIFIED TYPE: ICD-10-CM

## 2024-01-30 LAB
ANION GAP SERPL CALCULATED.3IONS-SCNC: 13 MMOL/L (ref 7–15)
BUN SERPL-MCNC: 10.7 MG/DL (ref 6–20)
CALCIUM SERPL-MCNC: 8.9 MG/DL (ref 8.6–10)
CHLORIDE SERPL-SCNC: 100 MMOL/L (ref 98–107)
CREAT SERPL-MCNC: 0.59 MG/DL (ref 0.51–0.95)
DEPRECATED HCO3 PLAS-SCNC: 20 MMOL/L (ref 22–29)
EGFRCR SERPLBLD CKD-EPI 2021: >90 ML/MIN/1.73M2
GLUCOSE SERPL-MCNC: 92 MG/DL (ref 70–99)
HGB BLD-MCNC: 7.2 G/DL (ref 11.7–15.7)
POTASSIUM SERPL-SCNC: 3.8 MMOL/L (ref 3.4–5.3)
SODIUM SERPL-SCNC: 133 MMOL/L (ref 135–145)

## 2024-01-30 PROCEDURE — 250N000011 HC RX IP 250 OP 636: Performed by: STUDENT IN AN ORGANIZED HEALTH CARE EDUCATION/TRAINING PROGRAM

## 2024-01-30 PROCEDURE — P9047 ALBUMIN (HUMAN), 25%, 50ML: HCPCS | Performed by: STUDENT IN AN ORGANIZED HEALTH CARE EDUCATION/TRAINING PROGRAM

## 2024-01-30 PROCEDURE — 80048 BASIC METABOLIC PNL TOTAL CA: CPT | Performed by: STUDENT IN AN ORGANIZED HEALTH CARE EDUCATION/TRAINING PROGRAM

## 2024-01-30 PROCEDURE — 36415 COLL VENOUS BLD VENIPUNCTURE: CPT

## 2024-01-30 PROCEDURE — 49083 ABD PARACENTESIS W/IMAGING: CPT

## 2024-01-30 PROCEDURE — 250N000009 HC RX 250: Performed by: RADIOLOGY

## 2024-01-30 PROCEDURE — 85018 HEMOGLOBIN: CPT | Performed by: STUDENT IN AN ORGANIZED HEALTH CARE EDUCATION/TRAINING PROGRAM

## 2024-01-30 RX ORDER — LIDOCAINE 40 MG/G
CREAM TOPICAL
Status: DISCONTINUED | OUTPATIENT
Start: 2024-01-30 | End: 2024-01-31 | Stop reason: HOSPADM

## 2024-01-30 RX ORDER — ALBUMIN (HUMAN) 12.5 G/50ML
25-50 SOLUTION INTRAVENOUS ONCE
Status: DISCONTINUED | OUTPATIENT
Start: 2024-01-30 | End: 2024-01-30

## 2024-01-30 RX ORDER — ALBUMIN (HUMAN) 12.5 G/50ML
25-50 SOLUTION INTRAVENOUS ONCE
Status: COMPLETED | OUTPATIENT
Start: 2024-01-30 | End: 2024-01-30

## 2024-01-30 RX ADMIN — ALBUMIN HUMAN 50 G: 0.25 SOLUTION INTRAVENOUS at 09:27

## 2024-01-30 RX ADMIN — LIDOCAINE HYDROCHLORIDE 6 ML: 10 INJECTION, SOLUTION EPIDURAL; INFILTRATION; INTRACAUDAL; PERINEURAL at 09:15

## 2024-01-30 NOTE — TELEPHONE ENCOUNTER
Caller: LVM for Priya  Reason for Reschedule/Cancellation (please be detailed, any staff messages or encounters to note?): Patient needs hosptial setting due to Cirrhosis of liver. Patient did not answer yes to this during screening questions.       Prior to reschedule please review:  Ordering Provider:     Luci Mendez MD in  HEPATOLOGY     Sedation Determined: moderate but RN determined MAC is needed at [facility]   Does patient have any ASC Exclusions, please identify?: y      Notes on Cancelled Procedure:  Procedure: Upper Endoscopy [EGD]   Date: 02/08/2024  [facility] [address] [place] [address], [address] Floor, [place] [address]  Surgeon: Vanessa      Rescheduled: No CASE IN DEPOT

## 2024-01-30 NOTE — DISCHARGE INSTRUCTIONS
Radiology  Discharge Instructions for Abdominal Paracentesis    You have had an abdominal paracentesis procedure today.  A needle or catheter was put into your abdomen to remove fluid for laboratory studies and/or to remove the extra fluid from your abdomen.    AFTER YOU ARE HOME:  Rest at home today.  Limit physical activity such as lifting, straining, or exercise for 48 hours.  You may resume normal activity in 24 hours.  Resume previous diet and medications.  You may remove bandage in 24 hours.    CALL YOUR PRIMARY PROVIDER IF:  You develop temperature over 101o F, or redness at the drainage site.  You have any other questions or concerns.    AFTER HOURS CALL [facility] NURSE ADVISORS AT [phone]    COME TO EMERGENCY ROOM IF:  You develop severe abdominal pain, swelling the size of a baseball or larger, continuous oozing from puncture site longer than 24 hours, bleeding that saturates a gauze dressing even after you have put direct pressure on the site for 15 minutes, severe lightheadedness or fainting.  DO NOT DRIVE YOURSELF.

## 2024-01-30 NOTE — PROGRESS NOTES
L mid abomdinal paracentesis performed by radiologist.   3300 ml clear yellow fluid removed. Pressure held at site after catheter removed. No bleeding noted. Given Albumin 50 g per Dr Mendez order.

## 2024-01-30 NOTE — TELEPHONE ENCOUNTER
Rescheduled: Yes  Procedure: Upper Endoscopy [EGD]   Date: 06/27/2024  Location: [facility]; [address], [address], [place] [address]  Surgeon: DENICE alves per Dr. Mendez  Sedation Level Scheduled  MAC,  Reason for Sedation Level RN Review- esvin see original screening notes  Prep/Instructions updated and sent: Y       Send In - basket message to Panc - Rehan Pool if EUS  procedure is canceled or rescheduled: [ N/A, YES or NO] n/a

## 2024-01-30 NOTE — TELEPHONE ENCOUNTER
Pre Assessment RN Review    Focused Assessments      Cirrhosis Assessment    Results in Past 90 Days  Result Component Current Result Ref Range Previous Result Ref Range   Hemoglobin 7.2 (L) (1/30/2024) 11.7 - 15.7 g/dL 7.9 (L) (1/17/2024) 11.7 - 15.7 g/dL   INR 1.81 (H) (1/4/2024) 0.85 - 1.15 2.11 (H) (12/14/2023) 0.85 - 1.15   Platelet Count 178 (1/17/2024) 150 - 450 10e3/uL 176 (12/20/2023) 150 - 450 10e3/uL       MELD 3.0: 18 at 1/4/2024 10:07 AM  MELD-Na: 18 at 1/4/2024 10:07 AM  Calculated from:  Serum Creatinine: 0.65 mg/dL (Using min of 1 mg/dL) at 1/4/2024  9:52 AM  Serum Sodium: 135 mmol/L at 1/4/2024  9:52 AM  Total Bilirubin: 2.3 mg/dL at 1/4/2024  9:52 AM  Serum Albumin: 3.4 g/dL at 1/4/2024  9:52 AM  INR(ratio): 1.81 at 1/4/2024 10:07 AM  Age at listing (hypothetical): 28 years  Sex: Female at 1/4/2024 10:07 AM      INR <= 2.0*  and  Hgb >= 9 g/dL  and  Plt >= 50 10^9/L INR > 2.0   OR  Hgb < 9 g/dL   OR  Plt < 50 10^9/L   No Scheduling Restrictions Hospital Only   *Exception for patients on anticoagulation therapy.    Hx of variceal bleeding? No. (no scheduling restrictions)    Paracentesis in the last month? Yes (Hospital Only)      Scheduling Status & Recommendations    Sedation: MAC/Deep Sedation - Per RN assessment.  Location Type: Hospital - Per RN assessment.    Sunshine De Santiago RN  Endoscopy Procedure Pre Assessment RN

## 2024-01-30 NOTE — TELEPHONE ENCOUNTER
"Endoscopy Scheduling Screen    Have you had a positive Covid test in the last 14 days?  No    Are you active on MyChart?   Yes    What insurance is in the chart?  Other:  [employer]    Ordering/Referring Provider:     Luci Mendez MD in  HEPATOLOGY      (If ordering provider performs procedure, schedule with ordering provider unless otherwise instructed. )    BMI: Estimated body mass index is 33.12 kg/m  as calculated from the following:    Height as of 12/4/23: 1.588 m (5' 2.5\").    Weight as of 12/13/23: 83.5 kg (184 lb).     Sedation Ordered  moderate sedation.   If patient BMI > 50 do not schedule in ASC.    If patient BMI > 45 do not schedule at [facility].    Are you taking methadone or Suboxone?  No    Have you had difficulties, pain, or discomfort during past endoscopy procedures?  No    Are you taking any prescription medications for pain 3 or more times per week?   NO - No RN review required.    Do you have a history of malignant hyperthermia or adverse reaction to anesthesia?  No    (Females) Are you currently pregnant?   No     Have you been diagnosed or told you have pulmonary hypertension?   No    Do you have an LVAD?  No    Have you been told you have moderate to severe sleep apnea?  No    Have you been told you have COPD, asthma, or any other lung disease?  No    Do you have any heart conditions?  No     Have you ever had an organ transplant?   No    Have you ever had or are you awaiting a heart or lung transplant?   No    Have you had a stroke or transient ischemic attack (TIA aka \"mini stroke\" in the last 6 months?   No    Have you been diagnosed with or been told you have cirrhosis of the liver?   No    Are you currently on dialysis?   No    Do you need assistance transferring?   No    BMI: Estimated body mass index is 33.12 kg/m  as calculated from the following:    Height as of 12/4/23: 1.588 m (5' 2.5\").    Weight as of 12/13/23: 83.5 kg (184 lb).     Is patients BMI > 40 and scheduling location " [facility]?  No    Do you take an injectable medication for weight loss or diabetes (excluding insulin)?  No    Do you take the medication Naltrexone?  No    Do you take blood thinners?  No       Prep   Are you currently on dialysis or do you have chronic kidney disease?  No    Do you have a diagnosis of diabetes?  No    Do you have a diagnosis of cystic fibrosis (CF)?  No    On a regular basis do you go 3 -5 days between bowel movements?  No    BMI > 40?  No    Preferred Pharmacy:    [id number] - [place] [id number]  [address] [address]  Phone: [phone] Fax: [phone]      Final Scheduling Details   Colonoscopy prep sent?  Standard MiraLAX    Procedure scheduled  Upper endoscopy (EGD)    Surgeon:  Vanessa     Date of procedure:  02/08/2024     Pre-OP / PAC:   No - Not required for this site.    Location  [facility] - ASC - Per order.    Sedation   Moderate Sedation - Per order.      Patient Reminders:   You will receive a call from a Nurse to review instructions and health history.  This assessment must be completed prior to your procedure.  Failure to complete the Nurse assessment may result in the procedure being cancelled.      On the day of your procedure, please designate an adult(s) who can drive you home stay with you for the next 24 hours. The medicines used in the exam will make you sleepy. You will not be able to drive.      You cannot take public transportation, ride share services, or non-medical taxi service without a responsible caregiver.  Medical transport services are allowed with the requirement that a responsible caregiver will receive you at your destination.  We require that drivers and caregivers are confirmed prior to your procedure.

## 2024-02-02 ENCOUNTER — OFFICE VISIT (OUTPATIENT)
Dept: FAMILY MEDICINE | Facility: CLINIC | Age: 29
End: 2024-02-02
Payer: COMMERCIAL

## 2024-02-02 VITALS
BODY MASS INDEX: 33.86 KG/M2 | HEART RATE: 88 BPM | TEMPERATURE: 98.4 F | HEIGHT: 62 IN | DIASTOLIC BLOOD PRESSURE: 60 MMHG | OXYGEN SATURATION: 100 % | WEIGHT: 184 LBS | RESPIRATION RATE: 12 BRPM | SYSTOLIC BLOOD PRESSURE: 108 MMHG

## 2024-02-02 DIAGNOSIS — L51.9 ERYTHEMA MULTIFORME: ICD-10-CM

## 2024-02-02 DIAGNOSIS — L50.9 URTICARIA: Primary | ICD-10-CM

## 2024-02-02 LAB
ERYTHROCYTE [DISTWIDTH] IN BLOOD BY AUTOMATED COUNT: 15.8 % (ref 10–15)
ERYTHROCYTE [SEDIMENTATION RATE] IN BLOOD BY WESTERGREN METHOD: 55 MM/HR (ref 0–20)
HCT VFR BLD AUTO: 26.2 % (ref 35–47)
HGB BLD-MCNC: 8.4 G/DL (ref 11.7–15.7)
MCH RBC QN AUTO: 30.1 PG (ref 26.5–33)
MCHC RBC AUTO-ENTMCNC: 32.1 G/DL (ref 31.5–36.5)
MCV RBC AUTO: 94 FL (ref 78–100)
PLATELET # BLD AUTO: 208 10E3/UL (ref 150–450)
RBC # BLD AUTO: 2.79 10E6/UL (ref 3.8–5.2)
WBC # BLD AUTO: 10.8 10E3/UL (ref 4–11)

## 2024-02-02 PROCEDURE — 86038 ANTINUCLEAR ANTIBODIES: CPT | Performed by: PHYSICIAN ASSISTANT

## 2024-02-02 PROCEDURE — 86160 COMPLEMENT ANTIGEN: CPT | Performed by: PHYSICIAN ASSISTANT

## 2024-02-02 PROCEDURE — 80053 COMPREHEN METABOLIC PANEL: CPT | Performed by: PHYSICIAN ASSISTANT

## 2024-02-02 PROCEDURE — 85652 RBC SED RATE AUTOMATED: CPT | Performed by: PHYSICIAN ASSISTANT

## 2024-02-02 PROCEDURE — 85027 COMPLETE CBC AUTOMATED: CPT | Performed by: PHYSICIAN ASSISTANT

## 2024-02-02 PROCEDURE — 99214 OFFICE O/P EST MOD 30 MIN: CPT | Performed by: PHYSICIAN ASSISTANT

## 2024-02-02 PROCEDURE — 86140 C-REACTIVE PROTEIN: CPT | Performed by: PHYSICIAN ASSISTANT

## 2024-02-02 PROCEDURE — 36415 COLL VENOUS BLD VENIPUNCTURE: CPT | Performed by: PHYSICIAN ASSISTANT

## 2024-02-02 RX ORDER — PREDNISONE 20 MG/1
40 TABLET ORAL DAILY
Qty: 10 TABLET | Refills: 0 | Status: SHIPPED | OUTPATIENT
Start: 2024-02-02 | End: 2024-02-07

## 2024-02-02 ASSESSMENT — ENCOUNTER SYMPTOMS
GASTROINTESTINAL NEGATIVE: 1
NEUROLOGICAL NEGATIVE: 1
CONSTITUTIONAL NEGATIVE: 1
RESPIRATORY NEGATIVE: 1
CARDIOVASCULAR NEGATIVE: 1

## 2024-02-02 NOTE — PROGRESS NOTES
Assessment & Plan     Erythema multiforme  Urticaria  Developed a rash that developed on the face and lips on 1/18/2024.  Presented to urgent care 1/20/2024.  At that time she was given prednisone.  The rash to the face and lips have resolved but continues to have the rash throughout her body.  The rash on exam today looks consistent with erythema multiforme.  She did have RSV at the end of December and was recently hospitalized.  She was on prednisone x 5 days.  Helped with the itching.  She does have underlying liver cirrhosis.  Would be concerned about worsening liver function so we will check a complete metabolic panel today and a CBC.  Would also be concerned about other underlying autoimmune disorders so we will also check an ETHAN.  Will also check a CRP, sed rate, will also check complement 3 and complement 4.  Also would like her to follow-up with dermatology if symptoms continue.  Will place her back on prednisone 40 mg x 5 days more so for the itching.  We did discuss having her follow-up in ER with primary care provider if symptoms worsen or develop any respiratory compromise.  Patient agreed to this plan.  Questions were answered  - CBC with platelets; Future  - Comprehensive metabolic panel (BMP + Alb, Alk Phos, ALT, AST, Total. Bili, TP); Future  - Complement C3; Future  - Complement C4; Future  - ESR: Erythrocyte sedimentation rate; Future  - CRP, inflammation; Future  - Anti Nuclear Aiyln IgG by IFA with Reflex; Future  - Adult Dermatology  Referral; Future  - predniSONE (DELTASONE) 20 MG tablet; Take 2 tablets (40 mg) by mouth daily for 5 days  - CBC with platelets  - Comprehensive metabolic panel (BMP + Alb, Alk Phos, ALT, AST, Total. Bili, TP)  - Complement C3  - Complement C4  - ESR: Erythrocyte sedimentation rate  - CRP, inflammation  - Anti Nuclear Ailyn IgG by IFA with Reflex          Devante Powers is a 28 year old, presenting for the following health issues:  Derm Problem (Body  "rash x 2 weeks. Was seen in Urgency room and given 5 day course of prednisone. Has also been taking allegra and benedryl for hives. Nothing helps. Did use new Rentinol serum on her face. Hasn't used anything else new. )        2/2/2024     4:12 PM   Additional Questions   Roomed by Macrina Alatorre   Accompanied by rene     Priya is a pleasant 26-year-old female who presents to the clinic today for a urgent care follow-up.  She was seen in urgent care on 1/20 for a rash that started on her face and then spread throughout her body.  She states that she has not been doing any new soaps, lotions, detergents.  She was admitted in the hospital in December for liver cirrhosis, sepsis, and was tested positive for RSV at that time.  In the urgent care she was given prednisone.  The rash on her face and lips resolved but she continues to have the rash throughout her body.  The rash are angular/circular in nature.  The rash looks similar to erythema and multiform.  She states that the prednisone helped with the itching and cleared up the rash on her face and mucous membranes around her lips but she continues to have the rash on her arms chest back and thighs.  She is not having any new symptoms.  She is not having any respiratory compromise.         Review of Systems   Constitutional: Negative.    HENT: Negative.     Respiratory: Negative.     Cardiovascular: Negative.    Gastrointestinal: Negative.    Genitourinary: Negative.    Skin:  Positive for rash.   Neurological: Negative.           Objective    /60 (BP Location: Right arm, Patient Position: Sitting, Cuff Size: Adult Regular)   Pulse 88   Temp 98.4  F (36.9  C) (Oral)   Resp 12   Ht 1.575 m (5' 2\")   Wt 83.5 kg (184 lb)   LMP 12/02/2023 (Approximate)   SpO2 100%   BMI 33.65 kg/m    Body mass index is 33.65 kg/m .  Physical Exam  Vitals and nursing note reviewed.   Constitutional:       General: She is not in acute distress.     Appearance: Normal appearance. " She is not ill-appearing, toxic-appearing or diaphoretic.   Eyes:      Conjunctiva/sclera: Conjunctivae normal.   Cardiovascular:      Rate and Rhythm: Normal rate and regular rhythm.      Heart sounds: No murmur heard.     No friction rub. No gallop.   Pulmonary:      Effort: Pulmonary effort is normal.      Breath sounds: No wheezing, rhonchi or rales.   Skin:     General: Skin is warm and dry.      Findings: Rash present.      Comments: Skin lesion to arms chest back and thighs.  Lesions are macular 1 to 2 cm inside without vesicular.  They are dull red target-like lesions.   Neurological:      Mental Status: She is alert.   Psychiatric:         Mood and Affect: Mood normal.         Behavior: Behavior normal.         Thought Content: Thought content normal.         Judgment: Judgment normal.                Signed Electronically by: Francis Poe PA-C

## 2024-02-03 LAB
ALBUMIN SERPL BCG-MCNC: 4 G/DL (ref 3.5–5.2)
ALP SERPL-CCNC: 100 U/L (ref 40–150)
ALT SERPL W P-5'-P-CCNC: 30 U/L (ref 0–50)
ANION GAP SERPL CALCULATED.3IONS-SCNC: 13 MMOL/L (ref 7–15)
AST SERPL W P-5'-P-CCNC: 65 U/L (ref 0–45)
BILIRUB SERPL-MCNC: 1.7 MG/DL
BUN SERPL-MCNC: 10.8 MG/DL (ref 6–20)
CALCIUM SERPL-MCNC: 9.2 MG/DL (ref 8.6–10)
CHLORIDE SERPL-SCNC: 99 MMOL/L (ref 98–107)
CREAT SERPL-MCNC: 0.62 MG/DL (ref 0.51–0.95)
CRP SERPL-MCNC: 3.71 MG/L
DEPRECATED HCO3 PLAS-SCNC: 22 MMOL/L (ref 22–29)
EGFRCR SERPLBLD CKD-EPI 2021: >90 ML/MIN/1.73M2
GLUCOSE SERPL-MCNC: 92 MG/DL (ref 70–99)
POTASSIUM SERPL-SCNC: 3.9 MMOL/L (ref 3.4–5.3)
PROT SERPL-MCNC: 7.7 G/DL (ref 6.4–8.3)
SODIUM SERPL-SCNC: 134 MMOL/L (ref 135–145)

## 2024-02-05 LAB
ANA SER QL IF: NEGATIVE
C3 SERPL-MCNC: 104 MG/DL (ref 81–157)
C4 SERPL-MCNC: 17 MG/DL (ref 13–39)

## 2024-02-06 ENCOUNTER — PATIENT OUTREACH (OUTPATIENT)
Dept: GASTROENTEROLOGY | Facility: CLINIC | Age: 29
End: 2024-02-06

## 2024-02-06 DIAGNOSIS — K70.31 ALCOHOLIC CIRRHOSIS OF LIVER WITH ASCITES (H): Primary | ICD-10-CM

## 2024-02-06 NOTE — PROGRESS NOTES
"Spoke with pt for check in. Pt continues with furosemide 40mg BID and spironolactone 100mg BID, states she's \"feeling great.\" Last went in for paracentesis on 2/7 but did not have enough ascites to drain, is scheduled for para next on 2/21. Denies any abdominal distention or leg swelling. BMP recheck on 1/30 WNL. Denies any melena, hematochezia, or hematemesis. Denies any fever, chills, or sweats. Last had Hgb checked on 2/2, result was 8.4. Pt states she \"has a lot more energy.\" Denies any dizziness, lightheadedness, dry mouth, or excessive fatigue. Pt is scheduled for follow-up with Dr. Mendez on 5/29/24, EGD for variceal screening on 6/27/24. Discussed need for HCC screening, orders placed, pt states she will schedule at [facility].  Reminded pt to call [phone] for chemical dependency assessment, discussed that this is required should she be recommended for liver transplant eval in the future. Pt verbalized understanding, states she will call this week.  Will plan to check back in with pt in 2-3 weeks.    Nadja Felder, RN Care Coordinator  [facility]  Hepatology Clinic/Specialty Program     "

## 2024-02-07 ENCOUNTER — HOSPITAL ENCOUNTER (OUTPATIENT)
Dept: ULTRASOUND IMAGING | Facility: CLINIC | Age: 29
Discharge: HOME | End: 2024-02-07
Attending: STUDENT IN AN ORGANIZED HEALTH CARE EDUCATION/TRAINING PROGRAM | Admitting: STUDENT IN AN ORGANIZED HEALTH CARE EDUCATION/TRAINING PROGRAM

## 2024-02-07 DIAGNOSIS — D64.9 ANEMIA, UNSPECIFIED TYPE: ICD-10-CM

## 2024-02-07 DIAGNOSIS — K70.11 ALCOHOLIC HEPATITIS WITH ASCITES (H): ICD-10-CM

## 2024-02-07 PROCEDURE — 49083 ABD PARACENTESIS W/IMAGING: CPT

## 2024-02-07 RX ORDER — LIDOCAINE 40 MG/G
CREAM TOPICAL
Status: DISCONTINUED | OUTPATIENT
Start: 2024-02-07 | End: 2024-02-08 | Stop reason: HOSPADM

## 2024-02-07 RX ORDER — ALBUMIN (HUMAN) 12.5 G/50ML
25-50 SOLUTION INTRAVENOUS ONCE
Status: CANCELLED | OUTPATIENT
Start: 2024-02-07 | End: 2024-02-07

## 2024-02-19 ENCOUNTER — TRANSFERRED RECORDS (OUTPATIENT)
Dept: HEALTH INFORMATION MANAGEMENT | Facility: CLINIC | Age: 29
End: 2024-02-19

## 2024-03-19 ENCOUNTER — PATIENT OUTREACH (OUTPATIENT)
Dept: GASTROENTEROLOGY | Facility: CLINIC | Age: 29
End: 2024-03-19

## 2024-03-19 NOTE — PROGRESS NOTES
Attempted to reach patient for check in X2, no answer, message left requesting call back, number provided.  Will re-attempt outreach in 1 month.    Nadja Felder, RN Care Coordinator  [facility]  Hepatology Clinic/Specialty Program

## 2024-04-18 DIAGNOSIS — K70.31 ALCOHOLIC CIRRHOSIS OF LIVER WITH ASCITES (H): Primary | ICD-10-CM

## 2024-04-26 ENCOUNTER — PATIENT OUTREACH (OUTPATIENT)
Dept: GASTROENTEROLOGY | Facility: CLINIC | Age: 29
End: 2024-04-26

## 2024-04-26 NOTE — PROGRESS NOTES
Attempted to reach patient for check in X2, no answer, message left requesting call back, number provided.    Nadja Felder, RN Care Coordinator  [facility]  Hepatology Clinic/Specialty Program

## 2024-05-21 ENCOUNTER — TELEPHONE (OUTPATIENT)
Dept: GASTROENTEROLOGY | Facility: CLINIC | Age: 29
End: 2024-05-21

## 2024-05-21 NOTE — TELEPHONE ENCOUNTER
Was the patient contacted by phone and reminded of the upcoming visit? message left    Was the patient instructed to bring a current list of all medications to the appointment or instructed to bring in all medication bottles? Yes     Was the patient instructed to arrive prior to the appointment time to have ordered labs drawn? Yes     Were the needed lab orders placed? Yes    Was lab appointment made 1 hour or more prior to visit? Writer changed appointment time to be an hour prior to appointment. Message left about time change and if she would like she can schedule the labs 2-5 days prior to appointment at her local [place].     Pina Daley, [facility]  5/21/2024 1:42 PM

## 2024-05-30 ENCOUNTER — TELEPHONE (OUTPATIENT)
Dept: GASTROENTEROLOGY | Facility: CLINIC | Age: 29
End: 2024-05-30

## 2024-05-30 NOTE — TELEPHONE ENCOUNTER
Caller: LVLOR for Priya    Reason for Reschedule/Cancellation   (please be detailed, any staff messages or encounters to note?): Provider out       Prior to reschedule please review:  Ordering Provider:     Luci Mendez MD in  HEPATOLOGY     Sedation Determined: MAC  Does patient have any ASC Exclusions, please identify?: Y cirrhosis of liver       Notes on Cancelled Procedure:  Procedure: Upper Endoscopy [EGD]   Date: 06/27/2024  Location: [facility]; [address] [place] [address], [address], [place] [id number]   Surgeon: Hector      Rescheduled: No, CASE IN DEPOT        Did you cancel or rescheduled an EUS procedure? No.

## 2024-06-04 ENCOUNTER — PATIENT OUTREACH (OUTPATIENT)
Dept: GASTROENTEROLOGY | Facility: CLINIC | Age: 29
End: 2024-06-04

## 2024-06-04 NOTE — PROGRESS NOTES
Called pt for check-in. Pt states she still needs to reschedule her EGD, has scheduling information available to her in a [employer] message. States she's at currently at work, will call writer back in the morning.  Will await callback from pt.    Nadja Felder, RN Care Coordinator  [facility]  Hepatology Clinic/Specialty Program

## 2024-07-16 ENCOUNTER — PATIENT OUTREACH (OUTPATIENT)
Dept: GASTROENTEROLOGY | Facility: CLINIC | Age: 29
End: 2024-07-16

## 2024-07-16 NOTE — PROGRESS NOTES
Attempted to reach patient for check in X2, no answer, message left requesting call back, number provided. Will re-attempt outreach in 1 month.    Nadja Felder, RN Care Coordinator  [facility]  Hepatology Clinic/Specialty Program

## 2024-08-20 ENCOUNTER — PATIENT OUTREACH (OUTPATIENT)
Dept: GASTROENTEROLOGY | Facility: CLINIC | Age: 29
End: 2024-08-20

## 2024-08-20 NOTE — PROGRESS NOTES
Attempted to reach patient for check in X2, no answer, message left requesting call back, number provided.  Will re-attempt outreach in 8-12 weeks.    Nadja Felder, RN Care Coordinator  [facility]  Hepatology Clinic/Specialty Program

## 2024-10-29 ENCOUNTER — PATIENT OUTREACH (OUTPATIENT)
Dept: GASTROENTEROLOGY | Facility: CLINIC | Age: 29
End: 2024-10-29

## 2024-10-29 NOTE — PROGRESS NOTES
Writer unable to get ahold of pt since February 2024, pt cancelled follow-up appts with [facility] hepatology. Per Dr. Mendez, OK to discharge from liver care coordination program.    Nadja Felder, JANICE Care Coordinator  [facility]  Hepatology Clinic/Specialty Program

## 2024-11-10 ENCOUNTER — HEALTH MAINTENANCE LETTER (OUTPATIENT)
Age: 29
End: 2024-11-10

## 2024-12-18 ENCOUNTER — TELEPHONE (OUTPATIENT)
Dept: GASTROENTEROLOGY | Facility: CLINIC | Age: 29
End: 2024-12-18

## 2024-12-18 DIAGNOSIS — K70.31 ALCOHOLIC CIRRHOSIS OF LIVER WITH ASCITES (H): Primary | ICD-10-CM

## 2024-12-18 NOTE — TELEPHONE ENCOUNTER
Left message for the patient that Dr. Mendez wants the patient to get labs and an ultrasound first since patient has not been seen in over a year. Patient also needs to schedule a follow up visit with either Dr. Mendez or one of the PAs. Schedulers will reach out to get the patient scheduled.     Kenisha PETERSEN LPN  Hepatology Clinic       ---------------  [facility] Call Center    Phone Message    May a detailed message be left on voicemail: yes     Reason for Call: Other: Patient called as she has some discomfort due to fluid and would like new US Paracentesis with Albumin orders placed.  Please follow up with patient when placed.     Action Taken: Message routed to:  [facility]    Travel Screening: Not Applicable

## (undated) RX ORDER — LIDOCAINE HYDROCHLORIDE 10 MG/ML
INJECTION, SOLUTION EPIDURAL; INFILTRATION; INTRACAUDAL; PERINEURAL
Status: DISPENSED
Start: 2023-11-27

## (undated) RX ORDER — ALBUMIN (HUMAN) 12.5 G/50ML
SOLUTION INTRAVENOUS
Status: DISPENSED
Start: 2024-01-30

## (undated) RX ORDER — LIDOCAINE HYDROCHLORIDE 10 MG/ML
INJECTION, SOLUTION EPIDURAL; INFILTRATION; INTRACAUDAL; PERINEURAL
Status: DISPENSED
Start: 2023-11-10

## (undated) RX ORDER — ALBUMIN (HUMAN) 12.5 G/50ML
SOLUTION INTRAVENOUS
Status: DISPENSED
Start: 2023-11-10

## (undated) RX ORDER — ALBUMIN (HUMAN) 12.5 G/50ML
SOLUTION INTRAVENOUS
Status: DISPENSED
Start: 2023-11-27

## (undated) RX ORDER — ALBUMIN (HUMAN) 12.5 G/50ML
SOLUTION INTRAVENOUS
Status: DISPENSED
Start: 2024-01-24

## (undated) RX ORDER — ALBUMIN (HUMAN) 12.5 G/50ML
SOLUTION INTRAVENOUS
Status: DISPENSED
Start: 2024-01-11

## (undated) RX ORDER — ALBUMIN (HUMAN) 12.5 G/50ML
SOLUTION INTRAVENOUS
Status: DISPENSED
Start: 2024-01-17

## (undated) RX ORDER — ALBUMIN (HUMAN) 12.5 G/50ML
SOLUTION INTRAVENOUS
Status: DISPENSED
Start: 2024-01-04

## (undated) RX ORDER — LIDOCAINE HYDROCHLORIDE 10 MG/ML
INJECTION, SOLUTION EPIDURAL; INFILTRATION; INTRACAUDAL; PERINEURAL
Status: DISPENSED
Start: 2023-07-28